# Patient Record
Sex: FEMALE | Race: WHITE | Employment: UNEMPLOYED | URBAN - METROPOLITAN AREA
[De-identification: names, ages, dates, MRNs, and addresses within clinical notes are randomized per-mention and may not be internally consistent; named-entity substitution may affect disease eponyms.]

---

## 2017-11-08 PROBLEM — F51.04 PSYCHOPHYSIOLOGICAL INSOMNIA: Status: ACTIVE | Noted: 2017-11-08

## 2017-11-08 PROBLEM — F41.1 GAD (GENERALIZED ANXIETY DISORDER): Status: ACTIVE | Noted: 2017-11-08

## 2017-11-08 PROBLEM — Z91.89 AT RISK FOR POLYPHARMACY: Status: ACTIVE | Noted: 2017-11-08

## 2017-11-08 PROBLEM — F90.2 ATTENTION DEFICIT HYPERACTIVITY DISORDER (ADHD), COMBINED TYPE: Status: ACTIVE | Noted: 2017-11-08

## 2018-01-18 ENCOUNTER — HOSPITAL ENCOUNTER (OUTPATIENT)
Dept: CT IMAGING | Age: 43
Discharge: HOME | End: 2018-01-18
Attending: FAMILY MEDICINE

## 2018-01-18 DIAGNOSIS — Z13.9 ENCOUNTER FOR SCREENING: ICD-10-CM

## 2018-05-03 PROCEDURE — 87086 URINE CULTURE/COLONY COUNT: CPT | Performed by: PHYSICIAN ASSISTANT

## 2018-05-03 PROCEDURE — 87798 DETECT AGENT NOS DNA AMP: CPT | Performed by: PHYSICIAN ASSISTANT

## 2018-05-03 PROCEDURE — 36415 COLL VENOUS BLD VENIPUNCTURE: CPT | Performed by: PHYSICIAN ASSISTANT

## 2018-05-03 PROCEDURE — 81015 MICROSCOPIC EXAM OF URINE: CPT | Performed by: PHYSICIAN ASSISTANT

## 2018-05-30 PROCEDURE — 87660 TRICHOMONAS VAGIN DIR PROBE: CPT | Performed by: PHYSICIAN ASSISTANT

## 2018-05-30 PROCEDURE — 87480 CANDIDA DNA DIR PROBE: CPT | Performed by: PHYSICIAN ASSISTANT

## 2018-05-30 PROCEDURE — 81015 MICROSCOPIC EXAM OF URINE: CPT | Performed by: PHYSICIAN ASSISTANT

## 2018-05-30 PROCEDURE — 87798 DETECT AGENT NOS DNA AMP: CPT | Performed by: PHYSICIAN ASSISTANT

## 2018-05-30 PROCEDURE — 87510 GARDNER VAG DNA DIR PROBE: CPT | Performed by: PHYSICIAN ASSISTANT

## 2018-11-14 ENCOUNTER — OFFICE VISIT (OUTPATIENT)
Dept: FAMILY MEDICINE CLINIC | Facility: CLINIC | Age: 43
End: 2018-11-14
Payer: COMMERCIAL

## 2018-11-14 VITALS
WEIGHT: 170 LBS | BODY MASS INDEX: 27.32 KG/M2 | OXYGEN SATURATION: 98 % | DIASTOLIC BLOOD PRESSURE: 70 MMHG | SYSTOLIC BLOOD PRESSURE: 100 MMHG | TEMPERATURE: 99 F | HEIGHT: 66 IN | HEART RATE: 94 BPM | RESPIRATION RATE: 18 BRPM

## 2018-11-14 DIAGNOSIS — H10.9 CONJUNCTIVITIS OF LEFT EYE, UNSPECIFIED CONJUNCTIVITIS TYPE: Primary | ICD-10-CM

## 2018-11-14 PROCEDURE — 99212 OFFICE O/P EST SF 10 MIN: CPT | Performed by: NURSE PRACTITIONER

## 2018-11-14 RX ORDER — POLYMYXIN B SULFATE AND TRIMETHOPRIM 1; 10000 MG/ML; [USP'U]/ML
1 SOLUTION OPHTHALMIC EVERY 4 HOURS
Qty: 1 BOTTLE | Refills: 0 | Status: SHIPPED | OUTPATIENT
Start: 2018-11-14 | End: 2018-11-21

## 2018-11-15 NOTE — PROGRESS NOTES
CHIEF COMPLAINT:   Patient presents with:  Irritation: Left eye since today, No OTC drops used. Itchiness  Tearing      HPI:   Tim Salvador is a 39year old female who presents with chief complaint of \"pink eye\". Symptoms began  1  days ago.  Symptoms • Lipids Neg    • Obesity Neg    • Psychiatric Neg       Social History    Tobacco Use      Smoking status: Never Smoker      Smokeless tobacco: Never Used    Alcohol use: No    Drug use: No        REVIEW OF SYSTEMS:   GENERAL: feels well otherwise, no fev • Polymyxin B-Trimethoprim 57490-7.1 UNIT/ML-% Ophthalmic Solution 1 Bottle 0     Sig: Place 1 drop into the left eye every 4 (four) hours for 7 days. Risks, benefits, complications and side effects of meds discussed.         Patient Instructions © 6211-0925 [employer] [address]. [address] [facility] [place], [address] [place]. All rights reserved. This information is not intended as a substitute for professional medical care. Always follow your healthcare professional's instructions.               C

## 2019-02-13 ENCOUNTER — OFFICE VISIT (OUTPATIENT)
Dept: FAMILY MEDICINE CLINIC | Facility: CLINIC | Age: 44
End: 2019-02-13
Payer: COMMERCIAL

## 2019-02-13 VITALS
HEART RATE: 76 BPM | HEIGHT: 65.5 IN | DIASTOLIC BLOOD PRESSURE: 70 MMHG | SYSTOLIC BLOOD PRESSURE: 110 MMHG | WEIGHT: 164 LBS | BODY MASS INDEX: 27 KG/M2 | TEMPERATURE: 99 F | RESPIRATION RATE: 12 BRPM

## 2019-02-13 DIAGNOSIS — E78.6 LOW HDL (UNDER 40): ICD-10-CM

## 2019-02-13 DIAGNOSIS — R42 DIZZINESS: Primary | ICD-10-CM

## 2019-02-13 PROCEDURE — 99203 OFFICE O/P NEW LOW 30 MIN: CPT | Performed by: FAMILY MEDICINE

## 2019-02-13 NOTE — PROGRESS NOTES
Elva Clark is a 39year old female who presents for a new pt assessment   HPI:   Patient presents with:  Establish Care  Dizziness   I reviewed her's Past Medical History, Past Surgical History, Family History and Social History and updated her electro facility-administered medications on file prior to visit. Past History:   She has LGSIL (low grade squamous intraepithelial lesion) on Pap smear; Raynaud phenomenon; Chronic sore throat; LPRD (laryngopharyngeal reflux disease); Stomach ulcer;  At risk index is 26.88 kg/m² as calculated from the following:    Height as of this encounter: 65.5\". Weight as of this encounter: 164 lb.    Physical Exam     ASSESSMENT AND PLAN:   Aida Sweet is a 39year old female who presents for a complete physical ex

## 2019-09-10 ENCOUNTER — OFFICE VISIT (OUTPATIENT)
Dept: SURGERY | Facility: CLINIC | Age: 44
End: 2019-09-10

## 2019-09-10 VITALS — WEIGHT: 159 LBS | BODY MASS INDEX: 26.17 KG/M2 | HEIGHT: 65.35 IN

## 2019-09-10 DIAGNOSIS — M62.08 RECTUS DIASTASIS: Primary | ICD-10-CM

## 2019-09-10 RX ORDER — MULTIVITAMIN WITH IRON
1 TABLET ORAL DAILY
COMMUNITY

## 2019-09-10 NOTE — PROGRESS NOTES
New Patient Consultation    This is the first visit for this 39year old female interested in abdominoplasty. History of Present Illness: The patient is a 39year old female referred by Dr. Romulo Ortega for evaluation for abdominal contouring.   The patient re Neg    • Obesity Neg    • Psychiatric Neg          Social History:    Alcohol use No         Smoking status: Never Smoker   Smokeless tobacco: Never Used         Drug use: No           Review of Systems:    General:   The patient denies, fever, chills, nig pulmonary embolism.      Gynecologic:  The patient denies irregular menses, pelvic pain, pain with intercourse, painful menses, or pregnancy    Musculoskeletal:  The patient denies muscle aches/pain, joint pain, stiff joints, neck pain, back pain or bone pa umbilical necrosis, nerve injury and subsequent numbness, hypertrophic scarring or keloid, swelling, scar visibility, as well as medical risks including DVT and PE.   We reviewed the expected postoperative course including need for drains, activity limitati

## 2019-09-11 ENCOUNTER — TELEPHONE (OUTPATIENT)
Dept: SURGERY | Facility: CLINIC | Age: 44
End: 2019-09-11

## 2019-09-11 NOTE — TELEPHONE ENCOUNTER
Pt calling regarding pending quote for abdominoplasty. Message routed to  pool to call patient back at [phone].

## 2019-09-13 ENCOUNTER — TELEPHONE (OUTPATIENT)
Dept: SURGERY | Facility: CLINIC | Age: 44
End: 2019-09-13

## 2019-09-13 NOTE — TELEPHONE ENCOUNTER
PT calling regarding obtaining surgical quote for abdominoplasty. Pt was seen by Dr. Carrol Bamberger on 9/10. Call routed to  to call patient back at [phone].

## 2019-09-18 ENCOUNTER — TELEPHONE (OUTPATIENT)
Dept: SURGERY | Facility: CLINIC | Age: 44
End: 2019-09-18

## 2019-09-18 NOTE — TELEPHONE ENCOUNTER
Reviewed cosmetic quote with patient and emailed her a copy to [email or web address]. I let the patient know what her next steps would be should she choose to accept the price quote.

## 2019-09-26 ENCOUNTER — TELEPHONE (OUTPATIENT)
Dept: SURGERY | Facility: CLINIC | Age: 44
End: 2019-09-26

## 2019-09-26 NOTE — TELEPHONE ENCOUNTER
Patient calling to hold a date for a tummy tuck with Dr Yadira Ortiz. Per patient, our office told her that surgery will take place within 2-3 weeks of the pre-op appointment. Informed patient that Dr Yadira Ortiz is booked until next year. Patient was surprised.  She

## 2019-09-30 ENCOUNTER — TELEPHONE (OUTPATIENT)
Dept: SURGERY | Facility: CLINIC | Age: 44
End: 2019-09-30

## 2019-09-30 NOTE — TELEPHONE ENCOUNTER
Patient calling to follow up on date for surgery. Informed her that I talked to Dr Laura Hilton and the soonest availability isn't until January. Adding patient to a wait list in case of cancellations. Patient would like surgery at [facility].  Holding 1/15 for surge

## 2019-10-07 ENCOUNTER — TELEPHONE (OUTPATIENT)
Dept: FAMILY MEDICINE CLINIC | Facility: CLINIC | Age: 44
End: 2019-10-07

## 2019-10-07 ENCOUNTER — OFFICE VISIT (OUTPATIENT)
Dept: SURGERY | Facility: CLINIC | Age: 44
End: 2019-10-07
Payer: COMMERCIAL

## 2019-10-07 DIAGNOSIS — L98.7 EXCESS SKIN OF ABDOMINAL WALL: Primary | ICD-10-CM

## 2019-10-07 PROCEDURE — 99212 OFFICE O/P EST SF 10 MIN: CPT | Performed by: PHYSICIAN ASSISTANT

## 2019-10-07 NOTE — PROGRESS NOTES
This is a 48-year-old female that presents today for preoperative discussion regarding her upcoming abdominoplasty procedure. She underwent a 60 pound weight loss with diet and lifestyle modification and is bothered by the excess skin of the abdomen.   She obtained today. She understands she needs to see her primary care physician for surgical clearance and H&P within 30 days of surgery. CBC and CMP will be required.   She has never been a smoker and does not have hypertension therefore at this time no EKG

## 2019-10-07 NOTE — TELEPHONE ENCOUNTER
Per Dr Romulo Ortega, Patient will need pre-op physical however no surgical date has been specified yet. OV note from surgeon, Dr Tomy Kruger 10/07/2019    needs to see her primary care physician for surgical clearance and H&P within 30 days of surgery.   CBC and CMP w

## 2019-10-07 NOTE — PATIENT INSTRUCTIONS
Surgeon:         Dr. Loida Cortez                                        Tel:        [phone]                                  Fax:        [phone]    Surgery/Procedure:                  Abdominoplasty   General anesthesia,  4 hours      Hospital:

## 2019-12-03 ENCOUNTER — OFFICE VISIT (OUTPATIENT)
Dept: FAMILY MEDICINE CLINIC | Facility: CLINIC | Age: 44
End: 2019-12-03
Payer: COMMERCIAL

## 2019-12-03 VITALS
SYSTOLIC BLOOD PRESSURE: 112 MMHG | DIASTOLIC BLOOD PRESSURE: 74 MMHG | HEART RATE: 62 BPM | WEIGHT: 160 LBS | TEMPERATURE: 99 F | RESPIRATION RATE: 17 BRPM | BODY MASS INDEX: 26.66 KG/M2 | HEIGHT: 65 IN

## 2019-12-03 DIAGNOSIS — F41.1 GAD (GENERALIZED ANXIETY DISORDER): ICD-10-CM

## 2019-12-03 DIAGNOSIS — Z00.00 LABORATORY EXAMINATION ORDERED AS PART OF A COMPLETE PHYSICAL EXAMINATION: ICD-10-CM

## 2019-12-03 DIAGNOSIS — J38.1 VOCAL CORD POLYP: ICD-10-CM

## 2019-12-03 DIAGNOSIS — Z00.00 WELL ADULT EXAM: Primary | ICD-10-CM

## 2019-12-03 DIAGNOSIS — B00.1 HERPES LABIALIS: ICD-10-CM

## 2019-12-03 DIAGNOSIS — F90.2 ATTENTION DEFICIT HYPERACTIVITY DISORDER (ADHD), COMBINED TYPE: ICD-10-CM

## 2019-12-03 PROCEDURE — 99396 PREV VISIT EST AGE 40-64: CPT | Performed by: PHYSICIAN ASSISTANT

## 2019-12-03 RX ORDER — DULOXETIN HYDROCHLORIDE 60 MG/1
1 CAPSULE, DELAYED RELEASE ORAL DAILY
COMMUNITY
Start: 2019-12-02 | End: 2021-04-21

## 2019-12-03 RX ORDER — PHENTERMINE HYDROCHLORIDE 37.5 MG/1
1 TABLET ORAL DAILY
Refills: 1 | COMMUNITY
Start: 2019-10-31 | End: 2020-01-09

## 2019-12-03 RX ORDER — BUPROPION HYDROCHLORIDE 150 MG/1
1 TABLET ORAL DAILY
COMMUNITY
Start: 2019-12-02

## 2019-12-03 RX ORDER — ALPRAZOLAM 2 MG/1
1 TABLET, EXTENDED RELEASE ORAL DAILY
Refills: 1 | COMMUNITY
Start: 2019-10-05

## 2019-12-03 NOTE — PROGRESS NOTES
DATE OF EXAM  12/3/2019    CHIEF COMPLAINT/REASON FOR VISIT  Annual exam.    HISTORY OF PRESENT ILLNESS  Rita Gauthier presents today for routine annual physical examination. Needs physical for work.  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 PHYSICAL EXAMINATION  Blood pressure 112/74, pulse 62, temperature 98.5 °F (36.9 °C), temperature source Oral, resp.  rate 17, height 65\", weight 160 lb (72.6 kg), last menstrual period 12/01/2019, not currently breastfee Future  - CBC WITH DIFFERENTIAL WITH PLATELET; Future  - TSH W REFLEX TO FREE T4; Future  - LIPID PANEL; Future    3. Vocal cord polyp  Chronic. Monitored by ENT. Noted to be benign and not currently requiring additional follow up.     4. Herpes labialis  W

## 2019-12-18 ENCOUNTER — LAB ENCOUNTER (OUTPATIENT)
Dept: LAB | Age: 44
End: 2019-12-18
Attending: PHYSICIAN ASSISTANT
Payer: COMMERCIAL

## 2019-12-18 DIAGNOSIS — Z00.00 LABORATORY EXAMINATION ORDERED AS PART OF A COMPLETE PHYSICAL EXAMINATION: ICD-10-CM

## 2019-12-18 PROCEDURE — 80050 GENERAL HEALTH PANEL: CPT | Performed by: PHYSICIAN ASSISTANT

## 2019-12-18 PROCEDURE — 36415 COLL VENOUS BLD VENIPUNCTURE: CPT | Performed by: PHYSICIAN ASSISTANT

## 2019-12-18 PROCEDURE — 80061 LIPID PANEL: CPT | Performed by: PHYSICIAN ASSISTANT

## 2019-12-19 ENCOUNTER — OFFICE VISIT (OUTPATIENT)
Dept: FAMILY MEDICINE CLINIC | Facility: CLINIC | Age: 44
End: 2019-12-19
Payer: COMMERCIAL

## 2019-12-19 VITALS
HEART RATE: 72 BPM | DIASTOLIC BLOOD PRESSURE: 70 MMHG | SYSTOLIC BLOOD PRESSURE: 90 MMHG | WEIGHT: 160 LBS | HEIGHT: 65.5 IN | TEMPERATURE: 98 F | BODY MASS INDEX: 26.34 KG/M2 | RESPIRATION RATE: 12 BRPM

## 2019-12-19 DIAGNOSIS — Z01.818 PREOPERATIVE CLEARANCE: Primary | ICD-10-CM

## 2019-12-19 DIAGNOSIS — E78.6 LOW HDL (UNDER 40): ICD-10-CM

## 2019-12-19 PROCEDURE — 99242 OFF/OP CONSLTJ NEW/EST SF 20: CPT | Performed by: FAMILY MEDICINE

## 2019-12-20 NOTE — H&P (VIEW-ONLY)
Cuate Lutz is a 38year old female who presents for a pre-operative physical exam at the request of Dr. Brianna Baltazar for evaluation of preoperative risk. Patient is to have abdominoplasty after weight loss, to be done by Dr. Brianna Balatzar  at [facility] on 1/15/2020. palpitations. Gastrointestinal: Negative. Negative for abdominal pain. Endocrine: Negative. Genitourinary: Negative. Negative for dysuria and difficulty urinating. Musculoskeletal: Negative for joint swelling. Skin: Negative.   Negative for dylan She has no axillary adenopathy. Neurological: She is alert and oriented to person, place, and time. She has normal strength and normal reflexes. No cranial nerve deficit or sensory deficit. Skin: Skin is warm, dry and intact. No rash noted.  She is not 11.9 11.0 - 15.0 %    RDW-SD 41.1 35.1 - 46.3 fL    Neutrophil Absolute Prelim 4.66 1.50 - 7.70 x10 (3) uL    Neutrophil Absolute 4.66 1.50 - 7.70 x10(3) uL    Lymphocyte Absolute 2.85 1.00 - 4.00 x10(3) uL    Monocyte Absolute 0.50 0.10 - 1.00 x10(3) uL

## 2019-12-20 NOTE — H&P
Cuate Lutz is a 38year old female who presents for a pre-operative physical exam at the request of Dr. Brianna Baltazar for evaluation of preoperative risk. Patient is to have abdominoplasty after weight loss, to be done by Dr. Brianna Baltazar  at [facility] on 1/15/2020. palpitations. Gastrointestinal: Negative. Negative for abdominal pain. Endocrine: Negative. Genitourinary: Negative. Negative for dysuria and difficulty urinating. Musculoskeletal: Negative for joint swelling. Skin: Negative.   Negative for dylan She has no axillary adenopathy. Neurological: She is alert and oriented to person, place, and time. She has normal strength and normal reflexes. No cranial nerve deficit or sensory deficit. Skin: Skin is warm, dry and intact. No rash noted.  She is not 11.9 11.0 - 15.0 %    RDW-SD 41.1 35.1 - 46.3 fL    Neutrophil Absolute Prelim 4.66 1.50 - 7.70 x10 (3) uL    Neutrophil Absolute 4.66 1.50 - 7.70 x10(3) uL    Lymphocyte Absolute 2.85 1.00 - 4.00 x10(3) uL    Monocyte Absolute 0.50 0.10 - 1.00 x10(3) uL

## 2020-01-08 ENCOUNTER — TELEPHONE (OUTPATIENT)
Dept: SURGERY | Facility: CLINIC | Age: 45
End: 2020-01-08

## 2020-01-08 DIAGNOSIS — M62.08 RECTUS DIASTASIS: Primary | ICD-10-CM

## 2020-01-08 NOTE — TELEPHONE ENCOUNTER
The patient called regarding multiple questions prior to planned surgery-  All questions answered to her satisfaction and she was appreciative of the advice.

## 2020-01-10 ENCOUNTER — TELEPHONE (OUTPATIENT)
Dept: SURGERY | Facility: CLINIC | Age: 45
End: 2020-01-10

## 2020-01-10 NOTE — TELEPHONE ENCOUNTER
Spoke to pt regarding her payment for her surgery next week, pt stated she didn't have her credit card with her, and that her  had it. Pt asked that I call back on Monday when she has the card.  I told pt that would be fine and I will call her on Mon

## 2020-01-13 ENCOUNTER — TELEPHONE (OUTPATIENT)
Dept: SURGERY | Facility: CLINIC | Age: 45
End: 2020-01-13

## 2020-01-13 NOTE — TELEPHONE ENCOUNTER
I spoke with the patient who called with pre-op questions-   We discussed surgical wash and what to expect on the day of surgery. All questions answered to the patient's satisfaction and she verbalized understanding.

## 2020-01-13 NOTE — TELEPHONE ENCOUNTER
LVM for pt to call me back, left my direct phone number. Need to collect payment for her procedure with Dr. Destiny Law on Wednesday.

## 2020-01-15 ENCOUNTER — ANESTHESIA EVENT (OUTPATIENT)
Dept: SURGERY | Facility: HOSPITAL | Age: 45
End: 2020-01-15

## 2020-01-15 ENCOUNTER — HOSPITAL ENCOUNTER (OUTPATIENT)
Facility: HOSPITAL | Age: 45
Setting detail: HOSPITAL OUTPATIENT SURGERY
Discharge: HOME | End: 2020-01-15
Attending: SURGERY | Admitting: SURGERY

## 2020-01-15 ENCOUNTER — ANESTHESIA (OUTPATIENT)
Dept: SURGERY | Facility: HOSPITAL | Age: 45
End: 2020-01-15

## 2020-01-15 VITALS
OXYGEN SATURATION: 96 % | HEIGHT: 66 IN | WEIGHT: 162.06 LBS | BODY MASS INDEX: 26.05 KG/M2 | HEART RATE: 92 BPM | TEMPERATURE: 99 F | RESPIRATION RATE: 18 BRPM | DIASTOLIC BLOOD PRESSURE: 76 MMHG | SYSTOLIC BLOOD PRESSURE: 112 MMHG

## 2020-01-15 DIAGNOSIS — M62.08 RECTUS DIASTASIS: ICD-10-CM

## 2020-01-15 LAB
POCT LOT NUMBER: NORMAL
POCT URINE PREGNANCY: NEGATIVE

## 2020-01-15 PROCEDURE — 0J083ZZ ALTERATION OF ABDOMEN SUBCUTANEOUS TISSUE AND FASCIA, PERCUTANEOUS APPROACH: ICD-10-PCS | Performed by: SURGERY

## 2020-01-15 PROCEDURE — 0J080ZZ ALTERATION OF ABDOMEN SUBCUTANEOUS TISSUE AND FASCIA, OPEN APPROACH: ICD-10-PCS | Performed by: SURGERY

## 2020-01-15 PROCEDURE — 81025 URINE PREGNANCY TEST: CPT | Performed by: SURGERY

## 2020-01-15 RX ORDER — NALOXONE HYDROCHLORIDE 0.4 MG/ML
80 INJECTION, SOLUTION INTRAMUSCULAR; INTRAVENOUS; SUBCUTANEOUS AS NEEDED
Status: DISCONTINUED | OUTPATIENT
Start: 2020-01-15 | End: 2020-01-15

## 2020-01-15 RX ORDER — GLYCOPYRROLATE 0.2 MG/ML
INJECTION, SOLUTION INTRAMUSCULAR; INTRAVENOUS AS NEEDED
Status: DISCONTINUED | OUTPATIENT
Start: 2020-01-15 | End: 2020-01-15 | Stop reason: SURG

## 2020-01-15 RX ORDER — ACETAMINOPHEN 500 MG
1000 TABLET ORAL EVERY 6 HOURS PRN
COMMUNITY
End: 2020-06-23 | Stop reason: ALTCHOICE

## 2020-01-15 RX ORDER — PHENYLEPHRINE HCL 10 MG/ML
VIAL (ML) INJECTION AS NEEDED
Status: DISCONTINUED | OUTPATIENT
Start: 2020-01-15 | End: 2020-01-15 | Stop reason: SURG

## 2020-01-15 RX ORDER — HYDROMORPHONE HYDROCHLORIDE 1 MG/ML
INJECTION, SOLUTION INTRAMUSCULAR; INTRAVENOUS; SUBCUTANEOUS AS NEEDED
Status: DISCONTINUED | OUTPATIENT
Start: 2020-01-15 | End: 2020-01-15 | Stop reason: SURG

## 2020-01-15 RX ORDER — MIDAZOLAM HYDROCHLORIDE 1 MG/ML
INJECTION INTRAMUSCULAR; INTRAVENOUS AS NEEDED
Status: DISCONTINUED | OUTPATIENT
Start: 2020-01-15 | End: 2020-01-15 | Stop reason: SURG

## 2020-01-15 RX ORDER — LIDOCAINE HYDROCHLORIDE AND EPINEPHRINE 10; 10 MG/ML; UG/ML
INJECTION, SOLUTION INFILTRATION; PERINEURAL AS NEEDED
Status: DISCONTINUED | OUTPATIENT
Start: 2020-01-15 | End: 2020-01-15 | Stop reason: HOSPADM

## 2020-01-15 RX ORDER — MEPERIDINE HYDROCHLORIDE 25 MG/ML
12.5 INJECTION INTRAMUSCULAR; INTRAVENOUS; SUBCUTANEOUS AS NEEDED
Status: DISCONTINUED | OUTPATIENT
Start: 2020-01-15 | End: 2020-01-15

## 2020-01-15 RX ORDER — CEFAZOLIN SODIUM/WATER 2 G/20 ML
SYRINGE (ML) INTRAVENOUS
Status: DISCONTINUED
Start: 2020-01-15 | End: 2020-01-15

## 2020-01-15 RX ORDER — ROCURONIUM BROMIDE 10 MG/ML
INJECTION, SOLUTION INTRAVENOUS AS NEEDED
Status: DISCONTINUED | OUTPATIENT
Start: 2020-01-15 | End: 2020-01-15 | Stop reason: SURG

## 2020-01-15 RX ORDER — LIDOCAINE HYDROCHLORIDE 10 MG/ML
INJECTION, SOLUTION EPIDURAL; INFILTRATION; INTRACAUDAL; PERINEURAL AS NEEDED
Status: DISCONTINUED | OUTPATIENT
Start: 2020-01-15 | End: 2020-01-15 | Stop reason: SURG

## 2020-01-15 RX ORDER — ACETAMINOPHEN 500 MG
1000 TABLET ORAL ONCE
Status: DISCONTINUED | OUTPATIENT
Start: 2020-01-15 | End: 2020-01-15 | Stop reason: HOSPADM

## 2020-01-15 RX ORDER — DOCUSATE SODIUM 100 MG/1
100 CAPSULE, LIQUID FILLED ORAL 2 TIMES DAILY
Qty: 40 CAPSULE | Refills: 0 | Status: SHIPPED | OUTPATIENT
Start: 2020-01-15 | End: 2020-03-10 | Stop reason: ALTCHOICE

## 2020-01-15 RX ORDER — DIPHENHYDRAMINE HYDROCHLORIDE 50 MG/ML
12.5 INJECTION INTRAMUSCULAR; INTRAVENOUS AS NEEDED
Status: DISCONTINUED | OUTPATIENT
Start: 2020-01-15 | End: 2020-01-15

## 2020-01-15 RX ORDER — ACETAMINOPHEN 10 MG/ML
INJECTION, SOLUTION INTRAVENOUS AS NEEDED
Status: DISCONTINUED | OUTPATIENT
Start: 2020-01-15 | End: 2020-01-15 | Stop reason: SURG

## 2020-01-15 RX ORDER — ONDANSETRON 4 MG/1
4 TABLET, FILM COATED ORAL EVERY 8 HOURS PRN
Qty: 20 TABLET | Refills: 0 | Status: SHIPPED | OUTPATIENT
Start: 2020-01-15 | End: 2020-03-10 | Stop reason: ALTCHOICE

## 2020-01-15 RX ORDER — HYDROCODONE BITARTRATE AND ACETAMINOPHEN 5; 325 MG/1; MG/1
1-2 TABLET ORAL EVERY 4 HOURS PRN
Qty: 40 TABLET | Refills: 0 | Status: SHIPPED | OUTPATIENT
Start: 2020-01-15 | End: 2020-03-10 | Stop reason: ALTCHOICE

## 2020-01-15 RX ORDER — BUPIVACAINE HYDROCHLORIDE AND EPINEPHRINE 5; 5 MG/ML; UG/ML
INJECTION, SOLUTION EPIDURAL; INTRACAUDAL; PERINEURAL AS NEEDED
Status: DISCONTINUED | OUTPATIENT
Start: 2020-01-15 | End: 2020-01-15 | Stop reason: HOSPADM

## 2020-01-15 RX ORDER — ONDANSETRON 2 MG/ML
INJECTION INTRAMUSCULAR; INTRAVENOUS AS NEEDED
Status: DISCONTINUED | OUTPATIENT
Start: 2020-01-15 | End: 2020-01-15 | Stop reason: SURG

## 2020-01-15 RX ORDER — CEFAZOLIN SODIUM/WATER 2 G/20 ML
2 SYRINGE (ML) INTRAVENOUS ONCE
Status: COMPLETED | OUTPATIENT
Start: 2020-01-15 | End: 2020-01-15

## 2020-01-15 RX ORDER — SODIUM CHLORIDE, SODIUM LACTATE, POTASSIUM CHLORIDE, CALCIUM CHLORIDE 600; 310; 30; 20 MG/100ML; MG/100ML; MG/100ML; MG/100ML
INJECTION, SOLUTION INTRAVENOUS CONTINUOUS
Status: DISCONTINUED | OUTPATIENT
Start: 2020-01-15 | End: 2020-01-15

## 2020-01-15 RX ORDER — HYDROMORPHONE HYDROCHLORIDE 1 MG/ML
0.4 INJECTION, SOLUTION INTRAMUSCULAR; INTRAVENOUS; SUBCUTANEOUS EVERY 5 MIN PRN
Status: DISCONTINUED | OUTPATIENT
Start: 2020-01-15 | End: 2020-01-15

## 2020-01-15 RX ORDER — NEOSTIGMINE METHYLSULFATE 1 MG/ML
INJECTION INTRAVENOUS AS NEEDED
Status: DISCONTINUED | OUTPATIENT
Start: 2020-01-15 | End: 2020-01-15 | Stop reason: SURG

## 2020-01-15 RX ORDER — DEXAMETHASONE SODIUM PHOSPHATE 4 MG/ML
VIAL (ML) INJECTION AS NEEDED
Status: DISCONTINUED | OUTPATIENT
Start: 2020-01-15 | End: 2020-01-15 | Stop reason: SURG

## 2020-01-15 RX ORDER — ONDANSETRON 2 MG/ML
4 INJECTION INTRAMUSCULAR; INTRAVENOUS AS NEEDED
Status: DISCONTINUED | OUTPATIENT
Start: 2020-01-15 | End: 2020-01-15

## 2020-01-15 RX ADMIN — ONDANSETRON 4 MG: 2 INJECTION INTRAMUSCULAR; INTRAVENOUS at 14:44:00

## 2020-01-15 RX ADMIN — CEFAZOLIN SODIUM/WATER 2 G: 2 G/20 ML SYRINGE (ML) INTRAVENOUS at 11:23:00

## 2020-01-15 RX ADMIN — PHENYLEPHRINE HCL 50 MCG: 10 MG/ML VIAL (ML) INJECTION at 14:15:00

## 2020-01-15 RX ADMIN — ROCURONIUM BROMIDE 20 MG: 10 INJECTION, SOLUTION INTRAVENOUS at 12:11:00

## 2020-01-15 RX ADMIN — HYDROMORPHONE HYDROCHLORIDE 0.2 MG: 1 INJECTION, SOLUTION INTRAMUSCULAR; INTRAVENOUS; SUBCUTANEOUS at 14:57:00

## 2020-01-15 RX ADMIN — PHENYLEPHRINE HCL 100 MCG: 10 MG/ML VIAL (ML) INJECTION at 11:53:00

## 2020-01-15 RX ADMIN — MIDAZOLAM HYDROCHLORIDE 2 MG: 1 INJECTION INTRAMUSCULAR; INTRAVENOUS at 11:03:00

## 2020-01-15 RX ADMIN — ROCURONIUM BROMIDE 20 MG: 10 INJECTION, SOLUTION INTRAVENOUS at 11:41:00

## 2020-01-15 RX ADMIN — PHENYLEPHRINE HCL 50 MCG: 10 MG/ML VIAL (ML) INJECTION at 13:07:00

## 2020-01-15 RX ADMIN — SODIUM CHLORIDE, SODIUM LACTATE, POTASSIUM CHLORIDE, CALCIUM CHLORIDE: 600; 310; 30; 20 INJECTION, SOLUTION INTRAVENOUS at 12:07:00

## 2020-01-15 RX ADMIN — HYDROMORPHONE HYDROCHLORIDE 0.2 MG: 1 INJECTION, SOLUTION INTRAMUSCULAR; INTRAVENOUS; SUBCUTANEOUS at 14:37:00

## 2020-01-15 RX ADMIN — LIDOCAINE HYDROCHLORIDE 50 MG: 10 INJECTION, SOLUTION EPIDURAL; INFILTRATION; INTRACAUDAL; PERINEURAL at 11:06:00

## 2020-01-15 RX ADMIN — ACETAMINOPHEN 1000 MG: 10 INJECTION, SOLUTION INTRAVENOUS at 12:24:00

## 2020-01-15 RX ADMIN — SODIUM CHLORIDE, SODIUM LACTATE, POTASSIUM CHLORIDE, CALCIUM CHLORIDE: 600; 310; 30; 20 INJECTION, SOLUTION INTRAVENOUS at 15:00:00

## 2020-01-15 RX ADMIN — GLYCOPYRROLATE 0.6 MG: 0.2 INJECTION, SOLUTION INTRAMUSCULAR; INTRAVENOUS at 14:44:00

## 2020-01-15 RX ADMIN — PHENYLEPHRINE HCL 50 MCG: 10 MG/ML VIAL (ML) INJECTION at 13:20:00

## 2020-01-15 RX ADMIN — HYDROMORPHONE HYDROCHLORIDE 0.2 MG: 1 INJECTION, SOLUTION INTRAMUSCULAR; INTRAVENOUS; SUBCUTANEOUS at 14:50:00

## 2020-01-15 RX ADMIN — ROCURONIUM BROMIDE 50 MG: 10 INJECTION, SOLUTION INTRAVENOUS at 11:06:00

## 2020-01-15 RX ADMIN — DEXAMETHASONE SODIUM PHOSPHATE 8 MG: 4 MG/ML VIAL (ML) INJECTION at 11:17:00

## 2020-01-15 RX ADMIN — NEOSTIGMINE METHYLSULFATE 4 MG: 1 INJECTION INTRAVENOUS at 14:44:00

## 2020-01-15 NOTE — BRIEF OP NOTE
Pre-Operative Diagnosis: Rectus diastasis [M62.08]     Post-Operative Diagnosis: Rectus diastasis [M62.08]      Procedure Performed:   Procedure(s):  Abdominoplasty and flank/abdominal liposuction    Surgeon(s) and Role:     Hao Benton MD - Primary

## 2020-01-15 NOTE — ANESTHESIA PROCEDURE NOTES
Airway  Urgency: elective      General Information and Staff    Patient location during procedure: OR  Anesthesiologist: Len Mak MD  Resident/CRNA: Candy Wasserman CRNA  Performed: CRNA     Indications and Patient Condition  Indications for airway

## 2020-01-15 NOTE — ANESTHESIA PREPROCEDURE EVALUATION
PRE-OP EVALUATION    Patient Name: Katia Espinal    Pre-op Diagnosis: Rectus diastasis [M62.08]    Procedure(s):  Abdominoplasty    Surgeon(s) and Role:     Dagoberto Boles MD - Primary    Pre-op vitals reviewed.   Temp: 98.3 °F (36.8 °C)  Pulse: 94  Resp disorder)     Attention deficit hyperactivity disorder (ADHD), combined type     Psychophysiological insomnia     Low HDL (under 40)          Past Surgical History:   Procedure Laterality Date   •      • UPPER GI ENDOSCOPY - REFERRAL

## 2020-01-15 NOTE — ANESTHESIA POSTPROCEDURE EVALUATION
[facility]    Delicia Reina Patient Status:  Hospital Outpatient Surgery   Age/Gender 38year old female MRN [id number]   Location [address] [facility] Attending Shauna Luciano MD   Hosp Day # 0 PCP MD Yennifer Germain

## 2020-01-15 NOTE — PROGRESS NOTES
We reviewed the plan for abdominoplasty with flank liposuction.  The reviewed the risks of surgery including but not limited to bleeding, infection, seroma, dehiscence, skin necrosis, injury to intra-abdominal viscera, contour abnormality, umbilical necrosi

## 2020-01-16 NOTE — OPERATIVE REPORT
[facility]    PATIENT'S NAME: Marion Gale BHAVNA C   ATTENDING PHYSICIAN: Kishore Suárez M.D. OPERATING PHYSICIAN: Kishore Suárez M.D.    PATIENT ACCOUNT#:   [de-identified]    LOCATION:  PACU [facility] PACU 5 EDWP 10  MEDICAL RECORD #:   [id number]       DATE OF B loosely secured with Kerlix. The abdomen was then prepped and draped sterilely. The lateral aspects of the abdominal scar were then infiltrated with 1% lidocaine with epinephrine.   Stab incisions were then created, and approximately 800 mL of tumescent s noted, and resection of sub-Cecy's   fascia was done directly with electrocautery. The wounds were then irrigated with saline irrigation and hemostasis was secured with electrocautery.   Two 15-St Helen[place] Ajith drains were exited through the lateral aspects

## 2020-01-21 ENCOUNTER — OFFICE VISIT (OUTPATIENT)
Dept: SURGERY | Facility: CLINIC | Age: 45
End: 2020-01-21
Payer: COMMERCIAL

## 2020-01-21 DIAGNOSIS — Z98.890 S/P ABDOMINOPLASTY: Primary | ICD-10-CM

## 2020-01-21 PROCEDURE — 99024 POSTOP FOLLOW-UP VISIT: CPT | Performed by: SURGERY

## 2020-01-21 NOTE — PROGRESS NOTES
Hannah Caruso is a 38year old female who presents today for a follow-up. She reports minimal discomfort. Physical Examination:  Incisions are with intact Steri-Strips. Umbilicus is viable. Drain effluent is serosanguineous.     Assessment and Plan

## 2020-01-24 ENCOUNTER — OFFICE VISIT (OUTPATIENT)
Dept: SURGERY | Facility: CLINIC | Age: 45
End: 2020-01-24
Payer: COMMERCIAL

## 2020-01-24 ENCOUNTER — TELEPHONE (OUTPATIENT)
Dept: SURGERY | Facility: CLINIC | Age: 45
End: 2020-01-24

## 2020-01-24 DIAGNOSIS — L98.7 EXCESS SKIN OF ABDOMINAL WALL: Primary | ICD-10-CM

## 2020-01-24 PROCEDURE — 99024 POSTOP FOLLOW-UP VISIT: CPT | Performed by: PHYSICIAN ASSISTANT

## 2020-01-24 NOTE — TELEPHONE ENCOUNTER
Patient called stating her drain out put was 1.22.20 at 25cc and 1.23.20 was 15cc and nothing this morning. Patient was instructed to come in to see racquel to have he drain removed.  Patient has verbalized understanding and will be in today

## 2020-01-24 NOTE — PROGRESS NOTES
This is a 58-year-old female that is 9 days status post her abdominoplasty with flank liposuction.   She is here today for additional drain evaluation as after her initial drain was removed earlier this week her second drain is draining less than 20 cc for

## 2020-02-11 ENCOUNTER — OFFICE VISIT (OUTPATIENT)
Dept: SURGERY | Facility: CLINIC | Age: 45
End: 2020-02-11
Payer: COMMERCIAL

## 2020-02-11 DIAGNOSIS — Z98.890 H/O ABDOMINOPLASTY: Primary | ICD-10-CM

## 2020-02-11 PROCEDURE — 99024 POSTOP FOLLOW-UP VISIT: CPT | Performed by: SURGERY

## 2020-02-11 NOTE — PROGRESS NOTES
Aida Sweet is a 38year old female who presents today for a follow-up. Her pain is well controlled. She is without new complaints. Physical Examination:  Abdomen: Flat. Umbilicus is viable.   There is a punctate area of separation noted at the s

## 2020-03-10 ENCOUNTER — OFFICE VISIT (OUTPATIENT)
Dept: SURGERY | Facility: CLINIC | Age: 45
End: 2020-03-10
Payer: COMMERCIAL

## 2020-03-10 DIAGNOSIS — L98.7 EXCESS SKIN OF ABDOMINAL WALL: Primary | ICD-10-CM

## 2020-03-10 PROCEDURE — 99024 POSTOP FOLLOW-UP VISIT: CPT | Performed by: SURGERY

## 2020-03-10 NOTE — PROGRESS NOTES
Jomar Garner is a 38year old female who presents today for a follow-up. She denies fever and chills. She denies nausea, vomiting, diarrhea or constipation. Physical Examination:  Wound: Incisions are well-healed. Umbilicus is well-healed.   Ther

## 2020-05-05 ENCOUNTER — TELEPHONE (OUTPATIENT)
Dept: FAMILY MEDICINE CLINIC | Facility: CLINIC | Age: 45
End: 2020-05-05

## 2020-05-05 NOTE — TELEPHONE ENCOUNTER
Virtual/Telephone Check-In    Aida Micki verbally declines a Virtual/Telephone Check-In service on 5/5/2020. Patient understands and accepts financial responsibility for any deductible, co-insurance and/or co-pays associated with this service.     Jessica

## 2020-05-05 NOTE — TELEPHONE ENCOUNTER
Here for physical 12/3/2019. Noticed 3-4 days ago an elevated, hard,painful lump extending from the perineum to the inner L thigh. Painful with walking,sitting and movement in general, feeling a pressure. About 1 1/2 inches in size.   Feels it has gotten

## 2020-05-06 ENCOUNTER — OFFICE VISIT (OUTPATIENT)
Dept: FAMILY MEDICINE CLINIC | Facility: CLINIC | Age: 45
End: 2020-05-06
Payer: COMMERCIAL

## 2020-05-06 VITALS
RESPIRATION RATE: 16 BRPM | SYSTOLIC BLOOD PRESSURE: 90 MMHG | TEMPERATURE: 98 F | BODY MASS INDEX: 27.64 KG/M2 | WEIGHT: 172 LBS | HEART RATE: 84 BPM | DIASTOLIC BLOOD PRESSURE: 60 MMHG | HEIGHT: 66 IN

## 2020-05-06 DIAGNOSIS — L72.9 INFECTED CYST OF SKIN: Primary | ICD-10-CM

## 2020-05-06 DIAGNOSIS — L08.9 INFECTED CYST OF SKIN: Primary | ICD-10-CM

## 2020-05-06 PROCEDURE — 99213 OFFICE O/P EST LOW 20 MIN: CPT | Performed by: PHYSICIAN ASSISTANT

## 2020-05-06 RX ORDER — SULFAMETHOXAZOLE AND TRIMETHOPRIM 800; 160 MG/1; MG/1
1 TABLET ORAL 2 TIMES DAILY
Qty: 14 TABLET | Refills: 0 | Status: SHIPPED | OUTPATIENT
Start: 2020-05-06 | End: 2020-05-12

## 2020-05-06 NOTE — PROGRESS NOTES
Patient presents with:  Derm Problem: x 5 days firm mass formed on the inside of her left thigh, sensitive to the touch       HISTORY OF Noemy Tyson is a 38year old female who presents for evaluation of vulvar lump.  Initially noticed 5 appropriately dressed. : 1.5 x 1 cm firm mass left inferior vulva. Mild warmth and tenderness to palpation. No drainage.       ASSESSMENT/ PLAN  (L72.9,  L08.9) Infected cyst of skin  (primary encounter diagnosis)  Plan: Suspect infected cyst vs small ab

## 2020-05-28 ENCOUNTER — TELEPHONE (OUTPATIENT)
Dept: OBGYN CLINIC | Facility: CLINIC | Age: 45
End: 2020-05-28

## 2020-05-28 NOTE — TELEPHONE ENCOUNTER
Per pt she says that her pain has been getting worse since about 2 days ago, with a discharge clear and with odor, a lots of pain specially when sitting. Please advise and call pt.  She has an appt on 06- and does not know if she should or could wait

## 2020-05-28 NOTE — TELEPHONE ENCOUNTER
Patient states she was seen by her PCP for bartholin cyst and was put on antibiotics for 2 weeks. It is still there and her doctor recommended she see Dr Baljeet Garcia . She has appointment for 6/8/20 but needs to come in sooner due to pain.  Patient will call ellie

## 2020-06-01 ENCOUNTER — OFFICE VISIT (OUTPATIENT)
Dept: OBGYN CLINIC | Facility: CLINIC | Age: 45
End: 2020-06-01
Payer: COMMERCIAL

## 2020-06-01 VITALS
HEART RATE: 114 BPM | WEIGHT: 177 LBS | SYSTOLIC BLOOD PRESSURE: 104 MMHG | BODY MASS INDEX: 28.45 KG/M2 | HEIGHT: 66 IN | DIASTOLIC BLOOD PRESSURE: 62 MMHG

## 2020-06-01 DIAGNOSIS — Z12.31 ENCOUNTER FOR SCREENING MAMMOGRAM FOR MALIGNANT NEOPLASM OF BREAST: ICD-10-CM

## 2020-06-01 DIAGNOSIS — Z01.419 WELL WOMAN EXAM WITH ROUTINE GYNECOLOGICAL EXAM: Primary | ICD-10-CM

## 2020-06-01 DIAGNOSIS — Z12.4 PAPANICOLAOU SMEAR FOR CERVICAL CANCER SCREENING: ICD-10-CM

## 2020-06-01 DIAGNOSIS — N76.0 VAGINITIS AND VULVOVAGINITIS: ICD-10-CM

## 2020-06-01 PROCEDURE — 87660 TRICHOMONAS VAGIN DIR PROBE: CPT | Performed by: OBSTETRICS & GYNECOLOGY

## 2020-06-01 PROCEDURE — 56420 I&D BARTHOLINS GLAND ABSCESS: CPT | Performed by: OBSTETRICS & GYNECOLOGY

## 2020-06-01 PROCEDURE — 88175 CYTOPATH C/V AUTO FLUID REDO: CPT | Performed by: OBSTETRICS & GYNECOLOGY

## 2020-06-01 PROCEDURE — 99386 PREV VISIT NEW AGE 40-64: CPT | Performed by: OBSTETRICS & GYNECOLOGY

## 2020-06-01 PROCEDURE — 87480 CANDIDA DNA DIR PROBE: CPT | Performed by: OBSTETRICS & GYNECOLOGY

## 2020-06-01 PROCEDURE — 87510 GARDNER VAG DNA DIR PROBE: CPT | Performed by: OBSTETRICS & GYNECOLOGY

## 2020-06-01 NOTE — PROGRESS NOTES
Fatuma Lakhani is a 38year old female [address] Patient's last menstrual period was 05/18/2020 (exact date). Patient presents with:  Gyn Problem: pt has had bartholin cyst for 3 weeks now. Was on antibiotic from [place]. Causing pain and discomfort.  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  Urethral Meatus:  normal in size, location, without lesions and prolapse  Bladder:  No fullness, masses or tenderness  Vagina:  Normal appearance without lesions, no abnormal discharge affirm.   Retaine

## 2020-06-01 NOTE — PROGRESS NOTES
Left 3 to 4 cm Bartholin's cyst.  Local was placed. Betadine was placed. The Bartholin's cyst was drained. Boo catheter could not be placed.

## 2020-06-02 RX ORDER — METRONIDAZOLE 500 MG/1
500 TABLET ORAL 2 TIMES DAILY
Qty: 14 TABLET | Refills: 0 | Status: SHIPPED | OUTPATIENT
Start: 2020-06-02 | End: 2020-06-09

## 2020-06-02 NOTE — PROGRESS NOTES
Patient aware of results and recommendations for Flagyl 500 mg BID X 7 days and scheduled for recheck in 1 month. Appointment made for 6/23/20. Patient verbalizes understanding.

## 2020-06-23 ENCOUNTER — OFFICE VISIT (OUTPATIENT)
Dept: OBGYN CLINIC | Facility: CLINIC | Age: 45
End: 2020-06-23
Payer: COMMERCIAL

## 2020-06-23 VITALS
SYSTOLIC BLOOD PRESSURE: 112 MMHG | HEIGHT: 66 IN | DIASTOLIC BLOOD PRESSURE: 62 MMHG | WEIGHT: 171 LBS | BODY MASS INDEX: 27.48 KG/M2

## 2020-06-23 DIAGNOSIS — N76.0 VAGINITIS AND VULVOVAGINITIS: Primary | ICD-10-CM

## 2020-06-23 DIAGNOSIS — Z12.4 PAPANICOLAOU SMEAR FOR CERVICAL CANCER SCREENING: ICD-10-CM

## 2020-06-23 PROCEDURE — 87480 CANDIDA DNA DIR PROBE: CPT | Performed by: OBSTETRICS & GYNECOLOGY

## 2020-06-23 PROCEDURE — 99213 OFFICE O/P EST LOW 20 MIN: CPT | Performed by: OBSTETRICS & GYNECOLOGY

## 2020-06-23 PROCEDURE — 87510 GARDNER VAG DNA DIR PROBE: CPT | Performed by: OBSTETRICS & GYNECOLOGY

## 2020-06-23 PROCEDURE — 87660 TRICHOMONAS VAGIN DIR PROBE: CPT | Performed by: OBSTETRICS & GYNECOLOGY

## 2020-06-23 NOTE — PROGRESS NOTES
Lissa Merino is a 38year old female. HPI:   Patient presents with:  Gyn Problem: re check BV infection? Thinks that she has resolved the trichomoniasis and bacterial vaginosis. She finished the medicine.   After finishing the medicine she had li

## 2020-06-24 ENCOUNTER — TELEPHONE (OUTPATIENT)
Dept: OBGYN CLINIC | Facility: CLINIC | Age: 45
End: 2020-06-24

## 2021-06-15 ENCOUNTER — OFFICE VISIT (OUTPATIENT)
Dept: OBGYN CLINIC | Facility: CLINIC | Age: 46
End: 2021-06-15
Payer: COMMERCIAL

## 2021-06-15 VITALS
HEIGHT: 66 IN | DIASTOLIC BLOOD PRESSURE: 66 MMHG | WEIGHT: 190 LBS | HEART RATE: 106 BPM | SYSTOLIC BLOOD PRESSURE: 110 MMHG | BODY MASS INDEX: 30.53 KG/M2

## 2021-06-15 DIAGNOSIS — N89.8 VAGINAL ODOR: ICD-10-CM

## 2021-06-15 DIAGNOSIS — Z12.4 PAPANICOLAOU SMEAR FOR CERVICAL CANCER SCREENING: ICD-10-CM

## 2021-06-15 DIAGNOSIS — N30.00 ACUTE CYSTITIS WITHOUT HEMATURIA: ICD-10-CM

## 2021-06-15 DIAGNOSIS — Z12.31 ENCOUNTER FOR SCREENING MAMMOGRAM FOR BREAST CANCER: Primary | ICD-10-CM

## 2021-06-15 DIAGNOSIS — Z01.419 WELL WOMAN EXAM WITH ROUTINE GYNECOLOGICAL EXAM: ICD-10-CM

## 2021-06-15 PROCEDURE — 3074F SYST BP LT 130 MM HG: CPT | Performed by: OBSTETRICS & GYNECOLOGY

## 2021-06-15 PROCEDURE — 87480 CANDIDA DNA DIR PROBE: CPT | Performed by: OBSTETRICS & GYNECOLOGY

## 2021-06-15 PROCEDURE — 87660 TRICHOMONAS VAGIN DIR PROBE: CPT | Performed by: OBSTETRICS & GYNECOLOGY

## 2021-06-15 PROCEDURE — 99396 PREV VISIT EST AGE 40-64: CPT | Performed by: OBSTETRICS & GYNECOLOGY

## 2021-06-15 PROCEDURE — 88175 CYTOPATH C/V AUTO FLUID REDO: CPT | Performed by: OBSTETRICS & GYNECOLOGY

## 2021-06-15 PROCEDURE — 87088 URINE BACTERIA CULTURE: CPT | Performed by: OBSTETRICS & GYNECOLOGY

## 2021-06-15 PROCEDURE — 87186 SC STD MICRODIL/AGAR DIL: CPT | Performed by: OBSTETRICS & GYNECOLOGY

## 2021-06-15 PROCEDURE — 87086 URINE CULTURE/COLONY COUNT: CPT | Performed by: OBSTETRICS & GYNECOLOGY

## 2021-06-15 PROCEDURE — 3078F DIAST BP <80 MM HG: CPT | Performed by: OBSTETRICS & GYNECOLOGY

## 2021-06-15 PROCEDURE — 87510 GARDNER VAG DNA DIR PROBE: CPT | Performed by: OBSTETRICS & GYNECOLOGY

## 2021-06-15 PROCEDURE — 3008F BODY MASS INDEX DOCD: CPT | Performed by: OBSTETRICS & GYNECOLOGY

## 2021-06-15 NOTE — PROGRESS NOTES
Ariel Donovan is a 54year old female  Patient's last menstrual period was 2021 (exact date). Patient presents with:  Wellness Visit: minor burning when urinating  . She has a history of infertility.   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   Uterus: normal in size, contour, position, mobility, without tenderness  Adnexa: normal without masses or tenderness  Perineum: normal  Anus: no hemorroids     Assessment & Plan:  Rita was seen today for wellness v

## 2021-06-16 ENCOUNTER — TELEPHONE (OUTPATIENT)
Dept: OBGYN CLINIC | Facility: CLINIC | Age: 46
End: 2021-06-16

## 2021-06-16 NOTE — TELEPHONE ENCOUNTER
Pt advised positive for BV and UTI, pt advised of usual recommendations for BV. Would like to go with what ever treatments are recommended by KD.

## 2021-06-17 RX ORDER — NITROFURANTOIN 25; 75 MG/1; MG/1
100 CAPSULE ORAL 2 TIMES DAILY
Qty: 14 CAPSULE | Refills: 0 | Status: SHIPPED | OUTPATIENT
Start: 2021-06-17 | End: 2021-06-24

## 2021-06-17 RX ORDER — METRONIDAZOLE 250 MG/1
250 TABLET ORAL 3 TIMES DAILY
Qty: 30 TABLET | Refills: 0 | Status: SHIPPED | OUTPATIENT
Start: 2021-06-17 | End: 2021-06-24

## 2021-06-17 NOTE — PROGRESS NOTES
Patient aware of lab results and recommendation to treat UTI with Macrobid and given options to treat BV. Patient would like to do Flagyl. Medication orders sent to Dr. Nando Parks. Advised patient to follow up with any new or worsening symptoms.  Patient verbal

## 2021-06-18 NOTE — TELEPHONE ENCOUNTER
Pt called to speak to a nurse about test results. Pt states she can see on MyChart there were no abnormalities but Pt states she was told to take the antibiotic. Please advise why.

## 2021-06-18 NOTE — TELEPHONE ENCOUNTER
Pt advised only pap was normal, normal comments were linked to other results as well. Understanding verbalized.

## 2021-06-29 ENCOUNTER — OFFICE VISIT (OUTPATIENT)
Dept: SURGERY | Facility: CLINIC | Age: 46
End: 2021-06-29
Payer: COMMERCIAL

## 2021-06-29 DIAGNOSIS — L91.0 HYPERTROPHIC SCAR: ICD-10-CM

## 2021-06-29 DIAGNOSIS — L91.0 KELOID SCAR: Primary | ICD-10-CM

## 2021-06-29 PROCEDURE — 96372 THER/PROPH/DIAG INJ SC/IM: CPT | Performed by: SURGERY

## 2021-06-29 NOTE — PROGRESS NOTES
Robbi Fish is a 54year old female who presents today for a follow-up. The patient relates that she noticed an opening of her abdominal incision several days ago. She denies fevers or chills.   She relates that she is gained approximate 30 pounds sin

## 2021-06-29 NOTE — PROCEDURES
The umbilicus was cleansed with alcohol. The areas of scar hypertrophy were injected with 0.2 mL of Kenalog 10. Band-Aid was applied. The patient tolerated the injections well. There are no complications.

## 2021-08-03 ENCOUNTER — OFFICE VISIT (OUTPATIENT)
Dept: SURGERY | Facility: CLINIC | Age: 46
End: 2021-08-03
Payer: COMMERCIAL

## 2021-08-03 DIAGNOSIS — L91.0 HYPERTROPHIC SCAR: ICD-10-CM

## 2021-08-03 DIAGNOSIS — L91.0 KELOID SCAR: Primary | ICD-10-CM

## 2021-08-03 PROCEDURE — 99212 OFFICE O/P EST SF 10 MIN: CPT | Performed by: SURGERY

## 2021-08-03 PROCEDURE — 11900 INJECT SKIN LESIONS </W 7: CPT | Performed by: SURGERY

## 2021-08-03 NOTE — PROCEDURES
The umbilicus was cleansed with alcohol. The areas of scar hypertrophy were injected with 0.3 mL of Kenalog 10. Band-Aid was applied. The patient tolerated the injections well. There are no complications.

## 2021-08-03 NOTE — PROGRESS NOTES
Marcela Zhou is a 54year old female who presents today for a follow-up. She denies fever and chills. Physical Examination:  Abdomen: There have breakdown is completely healed. The periumbilical scar hypertrophy is slightly improved.     Assessme

## 2021-08-26 ENCOUNTER — TELEPHONE (OUTPATIENT)
Dept: FAMILY MEDICINE CLINIC | Facility: CLINIC | Age: 46
End: 2021-08-26

## 2021-08-30 ENCOUNTER — TELEPHONE (OUTPATIENT)
Dept: FAMILY MEDICINE CLINIC | Facility: CLINIC | Age: 46
End: 2021-08-30

## 2021-08-30 DIAGNOSIS — D50.9 IRON DEFICIENCY ANEMIA, UNSPECIFIED IRON DEFICIENCY ANEMIA TYPE: Primary | ICD-10-CM

## 2021-08-30 DIAGNOSIS — Z00.00 LABORATORY EXAMINATION ORDERED AS PART OF A ROUTINE GENERAL MEDICAL EXAMINATION: ICD-10-CM

## 2021-08-30 DIAGNOSIS — Z13.0 SCREENING FOR IRON DEFICIENCY ANEMIA: ICD-10-CM

## 2021-08-30 DIAGNOSIS — Z12.31 VISIT FOR SCREENING MAMMOGRAM: ICD-10-CM

## 2021-08-30 DIAGNOSIS — R73.9 HYPERGLYCEMIA: ICD-10-CM

## 2021-08-30 NOTE — TELEPHONE ENCOUNTER
See TC from 8/26/21. Patient left a message on the referral line. She states she had MRI done at [facility] they are requesting a biopsy.    Please call patient back at [phone]

## 2021-08-30 NOTE — TELEPHONE ENCOUNTER
Please enter lab orders for the patient's upcoming physical appointment. Physical scheduled:    Your appointments     Date & Time Appointment Department [facility])    Sep 30, 2021  9:30 AM CDT Physical - Established with Yan Bernardo  [facility]

## 2021-08-30 NOTE — TELEPHONE ENCOUNTER
1. Iron deficiency anemia, unspecified iron deficiency anemia type (Primary)  -     CBC, Platelet; No Differential; Future; Expected date: 08/30/2021  -     Ferritin;  Future; Expected date: 08/30/2021  -     Iron And Tibc; Future; Expected date: 08/30/2021

## 2021-08-30 NOTE — TELEPHONE ENCOUNTER
Received fax from Kumar Wilson with patient's Right Breast Ultrasound results, placed in Dr. Erin Camejo' in box

## 2021-08-30 NOTE — TELEPHONE ENCOUNTER
Pt states she had breast MRI which recommends bx. She is unsure who ordered tests originally, but she requested results be sent to Dr Royce Ellis. Routed to Dr. Royce Ellis - have you received this report?     Future Appointments   Date Time Provider Department Cent

## 2021-08-31 NOTE — TELEPHONE ENCOUNTER
Impression    Incomplete, MRI directed ultrasound is recommended to assess the focal enhancement in the right breast at 5:00, in the absence of ultrasound correlation, MRI guided biopsy is recommended. Edenilson Karimi      RECOMMENDATION:     RIGHT BREAST:  A breast ultra

## 2021-09-22 ENCOUNTER — LAB ENCOUNTER (OUTPATIENT)
Dept: LAB | Age: 46
End: 2021-09-22
Attending: FAMILY MEDICINE
Payer: COMMERCIAL

## 2021-09-22 DIAGNOSIS — Z13.0 SCREENING FOR IRON DEFICIENCY ANEMIA: ICD-10-CM

## 2021-09-22 DIAGNOSIS — R73.9 HYPERGLYCEMIA: ICD-10-CM

## 2021-09-22 DIAGNOSIS — Z00.00 LABORATORY EXAMINATION ORDERED AS PART OF A ROUTINE GENERAL MEDICAL EXAMINATION: ICD-10-CM

## 2021-09-22 DIAGNOSIS — D50.9 IRON DEFICIENCY ANEMIA, UNSPECIFIED IRON DEFICIENCY ANEMIA TYPE: ICD-10-CM

## 2021-09-22 LAB
ALBUMIN SERPL-MCNC: 3.6 G/DL (ref 3.4–5)
ALBUMIN/GLOB SERPL: 0.9 {RATIO} (ref 1–2)
ALP LIVER SERPL-CCNC: 91 U/L
ALT SERPL-CCNC: 84 U/L
ANION GAP SERPL CALC-SCNC: 11 MMOL/L (ref 0–18)
AST SERPL-CCNC: 25 U/L (ref 15–37)
BILIRUB SERPL-MCNC: 0.8 MG/DL (ref 0.1–2)
BUN BLD-MCNC: 12 MG/DL (ref 7–18)
CALCIUM BLD-MCNC: 9.5 MG/DL (ref 8.5–10.1)
CHLORIDE SERPL-SCNC: 107 MMOL/L (ref 98–112)
CHOLEST SERPL-MCNC: 185 MG/DL (ref ?–200)
CO2 SERPL-SCNC: 22 MMOL/L (ref 21–32)
CREAT BLD-MCNC: 1.01 MG/DL
DEPRECATED HBV CORE AB SER IA-ACNC: 47.1 NG/ML
ERYTHROCYTE [DISTWIDTH] IN BLOOD BY AUTOMATED COUNT: 12.5 %
EST. AVERAGE GLUCOSE BLD GHB EST-MCNC: 117 MG/DL (ref 68–126)
GLOBULIN PLAS-MCNC: 4.2 G/DL (ref 2.8–4.4)
GLUCOSE BLD-MCNC: 98 MG/DL (ref 70–99)
HBA1C MFR BLD HPLC: 5.7 % (ref ?–5.7)
HCT VFR BLD AUTO: 41.9 %
HDLC SERPL-MCNC: 38 MG/DL (ref 40–59)
HGB BLD-MCNC: 13.5 G/DL
IRON SATURATION: 38 %
IRON SERPL-MCNC: 173 UG/DL
LDLC SERPL CALC-MCNC: 119 MG/DL (ref ?–100)
MCH RBC QN AUTO: 30.8 PG (ref 26–34)
MCHC RBC AUTO-ENTMCNC: 32.2 G/DL (ref 31–37)
MCV RBC AUTO: 95.4 FL
NONHDLC SERPL-MCNC: 147 MG/DL (ref ?–130)
OSMOLALITY SERPL CALC.SUM OF ELEC: 290 MOSM/KG (ref 275–295)
PATIENT FASTING Y/N/NP: YES
PATIENT FASTING Y/N/NP: YES
PLATELET # BLD AUTO: 403 10(3)UL (ref 150–450)
POTASSIUM SERPL-SCNC: 4 MMOL/L (ref 3.5–5.1)
PROT SERPL-MCNC: 7.8 G/DL (ref 6.4–8.2)
RBC # BLD AUTO: 4.39 X10(6)UL
SODIUM SERPL-SCNC: 140 MMOL/L (ref 136–145)
TOTAL IRON BINDING CAPACITY: 450 UG/DL (ref 240–450)
TRANSFERRIN SERPL-MCNC: 302 MG/DL (ref 200–360)
TRIGL SERPL-MCNC: 158 MG/DL (ref 30–149)
TSI SER-ACNC: 1.99 MIU/ML (ref 0.36–3.74)
VLDLC SERPL CALC-MCNC: 28 MG/DL (ref 0–30)
WBC # BLD AUTO: 8.7 X10(3) UL (ref 4–11)

## 2021-09-22 PROCEDURE — 82728 ASSAY OF FERRITIN: CPT | Performed by: FAMILY MEDICINE

## 2021-09-22 PROCEDURE — 80061 LIPID PANEL: CPT | Performed by: FAMILY MEDICINE

## 2021-09-22 PROCEDURE — 83036 HEMOGLOBIN GLYCOSYLATED A1C: CPT | Performed by: FAMILY MEDICINE

## 2021-09-22 PROCEDURE — 83540 ASSAY OF IRON: CPT | Performed by: FAMILY MEDICINE

## 2021-09-22 PROCEDURE — 83550 IRON BINDING TEST: CPT | Performed by: FAMILY MEDICINE

## 2021-09-22 PROCEDURE — 80050 GENERAL HEALTH PANEL: CPT | Performed by: FAMILY MEDICINE

## 2021-09-29 RX ORDER — ZOLPIDEM TARTRATE 10 MG/1
TABLET ORAL
COMMUNITY
Start: 2021-04-23 | End: 2021-09-30

## 2021-09-30 ENCOUNTER — OFFICE VISIT (OUTPATIENT)
Dept: FAMILY MEDICINE CLINIC | Facility: CLINIC | Age: 46
End: 2021-09-30
Payer: COMMERCIAL

## 2021-09-30 VITALS
RESPIRATION RATE: 14 BRPM | SYSTOLIC BLOOD PRESSURE: 122 MMHG | HEART RATE: 68 BPM | HEIGHT: 66 IN | BODY MASS INDEX: 30.76 KG/M2 | DIASTOLIC BLOOD PRESSURE: 60 MMHG | WEIGHT: 191.38 LBS

## 2021-09-30 DIAGNOSIS — Z00.00 ANNUAL PHYSICAL EXAM: Primary | ICD-10-CM

## 2021-09-30 DIAGNOSIS — R79.89 ELEVATED LFTS: ICD-10-CM

## 2021-09-30 DIAGNOSIS — E78.2 MIXED DYSLIPIDEMIA: ICD-10-CM

## 2021-09-30 DIAGNOSIS — R73.03 PREDIABETES: ICD-10-CM

## 2021-09-30 PROCEDURE — 99396 PREV VISIT EST AGE 40-64: CPT | Performed by: FAMILY MEDICINE

## 2021-09-30 PROCEDURE — 3078F DIAST BP <80 MM HG: CPT | Performed by: FAMILY MEDICINE

## 2021-09-30 PROCEDURE — 3008F BODY MASS INDEX DOCD: CPT | Performed by: FAMILY MEDICINE

## 2021-09-30 PROCEDURE — 3074F SYST BP LT 130 MM HG: CPT | Performed by: FAMILY MEDICINE

## 2021-09-30 NOTE — PROGRESS NOTES
Chanda Ortiz is a 54year old female who presents for a complete physical exam.     had concerns including Physical ([facility], see's gyn ). Annual Physical due on 06/15/2022      Subjective:     Symptoms: periods are regular.  Patient complains of recent Santiago Colonoscopy Never done      Review of Systems   Constitutional: Negative. Negative for activity change, appetite change, chills and fever. HENT: Negative. Eyes: Negative. Respiratory: Negative. Negative for shortness of breath.     Cardiovascu not in acute distress. Appearance: Normal appearance. HENT:      Head: Normocephalic and atraumatic.       Right Ear: Tympanic membrane and external ear normal.      Left Ear: Tympanic membrane and external ear normal.      Nose: Nose normal.      [employer] maintenance, Up to date    Immunizations: Up to date   Immunization History   Administered Date(s) Administered   • Covid-19 Vaccine Pfizer 30 mcg/0.3 ml 04/09/2021, 05/04/2021   • Fluvirin, 3 Years & >, Im 10/30/2007   • Influenza 01/23/2015   •

## 2021-10-20 ENCOUNTER — TELEPHONE (OUTPATIENT)
Dept: GENETICS | Facility: HOSPITAL | Age: 46
End: 2021-10-20

## 2021-11-09 ENCOUNTER — TELEPHONE (OUTPATIENT)
Dept: SURGERY | Facility: CLINIC | Age: 46
End: 2021-11-09

## 2021-11-09 ENCOUNTER — OFFICE VISIT (OUTPATIENT)
Dept: SURGERY | Facility: CLINIC | Age: 46
End: 2021-11-09
Payer: COMMERCIAL

## 2021-11-09 DIAGNOSIS — Z98.890 S/P ABDOMINOPLASTY: Primary | ICD-10-CM

## 2021-11-09 PROCEDURE — 99212 OFFICE O/P EST SF 10 MIN: CPT | Performed by: SURGERY

## 2021-11-09 NOTE — PROGRESS NOTES
Jomar Garner is a 54year old female who presents today for a follow-up. She complains of a painful abdominal scar. She reports itching and occasional breakdown of the lower abdominal scar. She has been unsuccessful at weight loss.   She is interested

## 2021-11-09 NOTE — TELEPHONE ENCOUNTER
Informing Caryn Jay, Cosmetic Coordinator, to submit for insurance approval for abdominal scar revision, 2 hours.

## 2021-11-11 ENCOUNTER — TELEPHONE (OUTPATIENT)
Dept: SURGERY | Facility: CLINIC | Age: 46
End: 2021-11-11

## 2021-11-11 NOTE — TELEPHONE ENCOUNTER
Patient called and would like to receive a quote while she wait to see if insurance will cover. Patient would like to get on the surgery schedule ASAP. Patient state that the scar cause her lots of pain and discomfort.  Patient was told I will talk to Dr. Tavo Rider

## 2021-11-24 ENCOUNTER — GENETICS ENCOUNTER (OUTPATIENT)
Dept: GENETICS | Age: 46
End: 2021-11-24
Attending: GENETIC COUNSELOR, MS
Payer: COMMERCIAL

## 2021-11-24 ENCOUNTER — NURSE ONLY (OUTPATIENT)
Dept: HEMATOLOGY/ONCOLOGY | Age: 46
End: 2021-11-24
Attending: GENETIC COUNSELOR, MS
Payer: COMMERCIAL

## 2021-11-24 DIAGNOSIS — Z80.3 FAMILY HISTORY OF BREAST CANCER: Primary | ICD-10-CM

## 2021-11-24 PROCEDURE — 36415 COLL VENOUS BLD VENIPUNCTURE: CPT

## 2021-11-24 PROCEDURE — 96040 HC GENETIC COUNSELING EA 30 MIN: CPT | Performed by: GENETIC COUNSELOR, MS

## 2021-11-24 NOTE — PROGRESS NOTES
Referring Provider:  Jennifer Lemon MD    Additional Provider(s):  Wilfred Guillen MD    Reason for Referral:  Eh Sawyer was referred for genetic counseling because of a family history of breast cancer.   Ms. John Garner is a 51year-old woman of [facility] and [place] PMS2, POLD1 (select regions), POLE (select regions), PTEN, RAD51C, RAD51D, RNF43, RPS20, SMAD4, STK11, and TP53 . Ms. Webb’s paternal grandmother  at age 37 from breast cancer. She did not have genetic testing.   Ms. Ubaldo Gonzalez paternal grandfather limitations of genetic testing were discussed including the potential implications of test results on clinical management. If a pathogenic variant is not identified (negative result), it is still possible that Ms. Vincenzo Gomez has a pathogenic variant in on test results with her biological family members so that they may have their risk assessed. Cancer Screening and Prevention Options for BRCA1/2 mutation carriers:   The lifetime risk for breast cancer in a BRCA1/2 mutation carrier is up to 60-80%, with u results. Results will also be communicated to Dr. Lexy Albarran and Dr. Swati Peterson. Approximately 40 minutes was spent in consultation with Ms. Gatito Jamil.

## 2021-12-07 ENCOUNTER — GENETICS ENCOUNTER (OUTPATIENT)
Dept: HEMATOLOGY/ONCOLOGY | Facility: HOSPITAL | Age: 46
End: 2021-12-07

## 2021-12-07 NOTE — PROGRESS NOTES
Referring Provider:                    Ramona Garner MD     Additional Provider(s):              Joo Frye MD     Reason for Referral:  Sirena Stiles had genetic testing performed on 11/24/21 because of a family history of breast cancer.      Genetic Test recommended to consider increased breast cancer surveillance including annual screening breast MRI. Ms. Bethel Mark is already following an increased breast cancer surveillance program with her medical providers.        Cc:  Jefferson Ordonez

## 2022-01-11 ENCOUNTER — OFFICE VISIT (OUTPATIENT)
Dept: SURGERY | Facility: CLINIC | Age: 47
End: 2022-01-11

## 2022-01-11 DIAGNOSIS — Z98.890 S/P ABDOMINOPLASTY: ICD-10-CM

## 2022-01-11 DIAGNOSIS — B36.9 FUNGAL RASH OF TORSO: Primary | ICD-10-CM

## 2022-01-11 RX ORDER — CLOTRIMAZOLE 1 %
1-2 CREAM (GRAM) TOPICAL 2 TIMES DAILY
Qty: 85 G | Refills: 0 | Status: SHIPPED | OUTPATIENT
Start: 2022-01-11

## 2022-01-11 NOTE — PATIENT INSTRUCTIONS
Surgeon:         Dr. Brianna Philip                                        Tel:        [phone]                                  Fax:        [phone]    Surgery/Procedure:     Revision of abdominal scar with abdominal/flank liposuction    General ane consent done 1/11/22  Photos done at initial consult  Quote accepted and signed today, 1/11/22

## 2022-01-11 NOTE — PROGRESS NOTES
Neli Olguin is a 51year old female who presents today for a follow-up. She continues to complain of intermittent breakdown of her lower abdominal wound. She denies fevers or chills. Physical Examination:  Abdomen:  Moderate hypertrophy of the malou

## 2022-01-27 ENCOUNTER — TELEPHONE (OUTPATIENT)
Dept: SURGERY | Facility: CLINIC | Age: 47
End: 2022-01-27

## 2022-01-27 DIAGNOSIS — L98.7 EXCESS SKIN OF ABDOMINAL WALL: Primary | ICD-10-CM

## 2022-01-27 PROBLEM — E78.2 MIXED DYSLIPIDEMIA: Status: RESOLVED | Noted: 2021-09-30 | Resolved: 2022-01-27

## 2022-01-27 RX ORDER — DEXTROAMPHETAMINE SACCHARATE, AMPHETAMINE ASPARTATE, DEXTROAMPHETAMINE SULFATE AND AMPHETAMINE SULFATE 7.5; 7.5; 7.5; 7.5 MG/1; MG/1; MG/1; MG/1
1 TABLET ORAL 2 TIMES DAILY
COMMUNITY
Start: 2022-01-12 | End: 2022-01-28

## 2022-01-27 RX ORDER — DEXTROAMPHETAMINE SACCHARATE, AMPHETAMINE ASPARTATE MONOHYDRATE, DEXTROAMPHETAMINE SULFATE AND AMPHETAMINE SULFATE 7.5; 7.5; 7.5; 7.5 MG/1; MG/1; MG/1; MG/1
30 CAPSULE, EXTENDED RELEASE ORAL EVERY MORNING
COMMUNITY
Start: 2022-01-12 | End: 2022-01-28

## 2022-01-27 RX ORDER — DESVENLAFAXINE 100 MG/1
100 TABLET, EXTENDED RELEASE ORAL DAILY
COMMUNITY
Start: 2022-01-22 | End: 2022-01-28 | Stop reason: ALTCHOICE

## 2022-01-27 NOTE — TELEPHONE ENCOUNTER
Calling pt in regards to scheduling surgery. Informed pt that I have 02/02/2022 available at [facility] with Dr. Rodriguez English. Pt verbalized understanding and in agreement with date and location. All questions answered.    Encouraged pt to call or MyChart

## 2022-01-28 ENCOUNTER — TELEPHONE (OUTPATIENT)
Dept: FAMILY MEDICINE CLINIC | Facility: CLINIC | Age: 47
End: 2022-01-28

## 2022-01-28 ENCOUNTER — OFFICE VISIT (OUTPATIENT)
Dept: FAMILY MEDICINE CLINIC | Facility: CLINIC | Age: 47
End: 2022-01-28
Payer: COMMERCIAL

## 2022-01-28 ENCOUNTER — LABORATORY ENCOUNTER (OUTPATIENT)
Dept: LAB | Age: 47
End: 2022-01-28
Attending: FAMILY MEDICINE
Payer: COMMERCIAL

## 2022-01-28 VITALS
DIASTOLIC BLOOD PRESSURE: 64 MMHG | BODY MASS INDEX: 32.16 KG/M2 | RESPIRATION RATE: 18 BRPM | SYSTOLIC BLOOD PRESSURE: 132 MMHG | WEIGHT: 195.38 LBS | HEIGHT: 65.5 IN | HEART RATE: 74 BPM

## 2022-01-28 DIAGNOSIS — Z01.818 PREOPERATIVE CLEARANCE: ICD-10-CM

## 2022-01-28 DIAGNOSIS — L90.5 SCAR OF ABDOMINAL WALL: Primary | ICD-10-CM

## 2022-01-28 DIAGNOSIS — Z01.818 PRE-OP TESTING: Primary | ICD-10-CM

## 2022-01-28 DIAGNOSIS — L98.7 EXCESS SKIN OF ABDOMINAL WALL: ICD-10-CM

## 2022-01-28 DIAGNOSIS — Z01.818 PRE-OP TESTING: ICD-10-CM

## 2022-01-28 DIAGNOSIS — R73.03 PREDIABETES: ICD-10-CM

## 2022-01-28 DIAGNOSIS — E78.6 LOW HDL (UNDER 40): ICD-10-CM

## 2022-01-28 DIAGNOSIS — E78.2 MIXED DYSLIPIDEMIA: ICD-10-CM

## 2022-01-28 LAB
ALBUMIN SERPL-MCNC: 3.8 G/DL (ref 3.4–5)
ALBUMIN/GLOB SERPL: 1.1 {RATIO} (ref 1–2)
ALP LIVER SERPL-CCNC: 81 U/L
ALT SERPL-CCNC: 41 U/L
ANION GAP SERPL CALC-SCNC: 7 MMOL/L (ref 0–18)
AST SERPL-CCNC: 34 U/L (ref 15–37)
BASOPHILS # BLD AUTO: 0.04 X10(3) UL (ref 0–0.2)
BASOPHILS NFR BLD AUTO: 0.5 %
BILIRUB SERPL-MCNC: 0.4 MG/DL (ref 0.1–2)
BUN BLD-MCNC: 10 MG/DL (ref 7–18)
CALCIUM BLD-MCNC: 9.5 MG/DL (ref 8.5–10.1)
CHLORIDE SERPL-SCNC: 107 MMOL/L (ref 98–112)
CO2 SERPL-SCNC: 26 MMOL/L (ref 21–32)
CREAT BLD-MCNC: 0.81 MG/DL
EOSINOPHIL # BLD AUTO: 0.05 X10(3) UL (ref 0–0.7)
EOSINOPHIL NFR BLD AUTO: 0.6 %
ERYTHROCYTE [DISTWIDTH] IN BLOOD BY AUTOMATED COUNT: 11.9 %
FASTING PATIENT LIPID ANSWER: YES
FASTING STATUS PATIENT QL REPORTED: YES
GLOBULIN PLAS-MCNC: 3.6 G/DL (ref 2.8–4.4)
GLUCOSE BLD-MCNC: 87 MG/DL (ref 70–99)
HCT VFR BLD AUTO: 40.2 %
HDLC SERPL-MCNC: 33 MG/DL (ref 40–59)
IMM GRANULOCYTES # BLD AUTO: 0.02 X10(3) UL (ref 0–1)
IMM GRANULOCYTES NFR BLD: 0.2 %
LDLC SERPL CALC-MCNC: 97 MG/DL (ref ?–100)
LYMPHOCYTES # BLD AUTO: 3.48 X10(3) UL (ref 1–4)
LYMPHOCYTES NFR BLD AUTO: 42 %
MCH RBC QN AUTO: 29.9 PG (ref 26–34)
MCHC RBC AUTO-ENTMCNC: 31.3 G/DL (ref 31–37)
MCV RBC AUTO: 95.3 FL
MONOCYTES # BLD AUTO: 0.59 X10(3) UL (ref 0.1–1)
MONOCYTES NFR BLD AUTO: 7.1 %
NEUTROPHILS # BLD AUTO: 4.1 X10 (3) UL (ref 1.5–7.7)
NEUTROPHILS # BLD AUTO: 4.1 X10(3) UL (ref 1.5–7.7)
NEUTROPHILS NFR BLD AUTO: 49.6 %
NONHDLC SERPL-MCNC: 124 MG/DL (ref ?–130)
OSMOLALITY SERPL CALC.SUM OF ELEC: 288 MOSM/KG (ref 275–295)
PLATELET # BLD AUTO: 432 10(3)UL (ref 150–450)
POTASSIUM SERPL-SCNC: 4.2 MMOL/L (ref 3.5–5.1)
PROT SERPL-MCNC: 7.4 G/DL (ref 6.4–8.2)
RBC # BLD AUTO: 4.22 X10(6)UL
SODIUM SERPL-SCNC: 140 MMOL/L (ref 136–145)
TRIGL SERPL-MCNC: 153 MG/DL (ref 30–149)
VLDLC SERPL CALC-MCNC: 25 MG/DL (ref 0–30)
WBC # BLD AUTO: 8.3 X10(3) UL (ref 4–11)

## 2022-01-28 PROCEDURE — 80061 LIPID PANEL: CPT

## 2022-01-28 PROCEDURE — 3078F DIAST BP <80 MM HG: CPT | Performed by: FAMILY MEDICINE

## 2022-01-28 PROCEDURE — 3008F BODY MASS INDEX DOCD: CPT | Performed by: FAMILY MEDICINE

## 2022-01-28 PROCEDURE — 36415 COLL VENOUS BLD VENIPUNCTURE: CPT

## 2022-01-28 PROCEDURE — 80053 COMPREHEN METABOLIC PANEL: CPT

## 2022-01-28 PROCEDURE — 93000 ELECTROCARDIOGRAM COMPLETE: CPT | Performed by: FAMILY MEDICINE

## 2022-01-28 PROCEDURE — 3075F SYST BP GE 130 - 139MM HG: CPT | Performed by: FAMILY MEDICINE

## 2022-01-28 PROCEDURE — 85025 COMPLETE CBC W/AUTO DIFF WBC: CPT

## 2022-01-28 PROCEDURE — 99243 OFF/OP CNSLTJ NEW/EST LOW 30: CPT | Performed by: FAMILY MEDICINE

## 2022-01-28 NOTE — H&P
Ximena Hendrickson is a 51year old female who presents for a pre-operative physical exam at the request of Dr. Panfilo Velasco for evaluation of preoperative risk. Patient is to have abdominal surgery, to be done by Dr. Panfilo Velasco  at [facility] on 2/2/22.     Pt has had previ for dysuria. Musculoskeletal: Negative for arthralgias. Skin: Negative. Negative for rash. Allergic/Immunologic: Negative. Neurological: Negative.          EXAM:   /64   Pulse 74   Resp 18   Ht 5' 5.5\" (1.664 m)   Wt 195 lb 6.4 oz (88.6 kg) and oriented to person, place, and time. Psychiatric:         Mood and Affect: Mood normal.         Behavior: Behavior normal.         Thought Content:  Thought content normal.          Results for orders placed or performed in visit on 01/28/22   COMP ME %    Lymphocyte % 42.0 %    Monocyte % 7.1 %    Eosinophil % 0.6 %    Basophil % 0.5 %    Immature Granulocyte % 0.2 %     Normal EKG done today in office.  See cardiology section  ASSESSMENT AND PLAN:   Luc Maher is a 51year old female who presents f

## 2022-01-28 NOTE — TELEPHONE ENCOUNTER
Patient having surgery 02/02/22 with Dr. Monique Wu H&P required with CBC and CMP. Patient scheduled with Dr. Sherman Arriola 01/28/22.      Patient is looking to get blood work done today before her appointment with Dr. Sherman Arriola at 12pm. Please place orders    Fax in triage

## 2022-01-29 ENCOUNTER — ANESTHESIA EVENT (OUTPATIENT)
Dept: SURGERY | Facility: HOSPITAL | Age: 47
End: 2022-01-29

## 2022-01-30 ENCOUNTER — LAB ENCOUNTER (OUTPATIENT)
Dept: LAB | Facility: HOSPITAL | Age: 47
End: 2022-01-30
Attending: SURGERY
Payer: COMMERCIAL

## 2022-01-30 DIAGNOSIS — L98.7 EXCESS SKIN OF ABDOMINAL WALL: ICD-10-CM

## 2022-01-31 LAB — SARS-COV-2 RNA RESP QL NAA+PROBE: NOT DETECTED

## 2022-02-02 ENCOUNTER — ANESTHESIA (OUTPATIENT)
Dept: SURGERY | Facility: HOSPITAL | Age: 47
End: 2022-02-02

## 2022-02-02 ENCOUNTER — HOSPITAL ENCOUNTER (OUTPATIENT)
Facility: HOSPITAL | Age: 47
Setting detail: HOSPITAL OUTPATIENT SURGERY
Discharge: HOME | End: 2022-02-02
Attending: SURGERY | Admitting: SURGERY

## 2022-02-02 VITALS
TEMPERATURE: 98 F | WEIGHT: 196.19 LBS | SYSTOLIC BLOOD PRESSURE: 119 MMHG | HEART RATE: 79 BPM | DIASTOLIC BLOOD PRESSURE: 76 MMHG | HEIGHT: 66 IN | OXYGEN SATURATION: 98 % | BODY MASS INDEX: 31.53 KG/M2 | RESPIRATION RATE: 16 BRPM

## 2022-02-02 DIAGNOSIS — L98.7 EXCESS SKIN OF ABDOMINAL WALL: Primary | ICD-10-CM

## 2022-02-02 LAB — B-HCG UR QL: NEGATIVE

## 2022-02-02 PROCEDURE — 0HB7XZZ EXCISION OF ABDOMEN SKIN, EXTERNAL APPROACH: ICD-10-PCS | Performed by: SURGERY

## 2022-02-02 PROCEDURE — 0J083ZZ ALTERATION OF ABDOMEN SUBCUTANEOUS TISSUE AND FASCIA, PERCUTANEOUS APPROACH: ICD-10-PCS | Performed by: SURGERY

## 2022-02-02 PROCEDURE — 81025 URINE PREGNANCY TEST: CPT

## 2022-02-02 RX ORDER — KETAMINE HYDROCHLORIDE 50 MG/ML
INJECTION, SOLUTION, CONCENTRATE INTRAMUSCULAR; INTRAVENOUS AS NEEDED
Status: DISCONTINUED | OUTPATIENT
Start: 2022-02-02 | End: 2022-02-02 | Stop reason: SURG

## 2022-02-02 RX ORDER — HYDROCODONE BITARTRATE AND ACETAMINOPHEN 5; 325 MG/1; MG/1
2 TABLET ORAL AS NEEDED
Status: DISCONTINUED | OUTPATIENT
Start: 2022-02-02 | End: 2022-02-02

## 2022-02-02 RX ORDER — NALOXONE HYDROCHLORIDE 0.4 MG/ML
80 INJECTION, SOLUTION INTRAMUSCULAR; INTRAVENOUS; SUBCUTANEOUS AS NEEDED
Status: DISCONTINUED | OUTPATIENT
Start: 2022-02-02 | End: 2022-02-02

## 2022-02-02 RX ORDER — LIDOCAINE HYDROCHLORIDE 10 MG/ML
INJECTION, SOLUTION EPIDURAL; INFILTRATION; INTRACAUDAL; PERINEURAL AS NEEDED
Status: DISCONTINUED | OUTPATIENT
Start: 2022-02-02 | End: 2022-02-02 | Stop reason: SURG

## 2022-02-02 RX ORDER — DIPHENHYDRAMINE HYDROCHLORIDE 50 MG/ML
12.5 INJECTION INTRAMUSCULAR; INTRAVENOUS AS NEEDED
Status: DISCONTINUED | OUTPATIENT
Start: 2022-02-02 | End: 2022-02-02

## 2022-02-02 RX ORDER — HYDROMORPHONE HYDROCHLORIDE 1 MG/ML
0.4 INJECTION, SOLUTION INTRAMUSCULAR; INTRAVENOUS; SUBCUTANEOUS EVERY 5 MIN PRN
Status: DISCONTINUED | OUTPATIENT
Start: 2022-02-02 | End: 2022-02-02

## 2022-02-02 RX ORDER — ONDANSETRON 4 MG/1
4 TABLET, FILM COATED ORAL EVERY 8 HOURS PRN
Qty: 30 TABLET | Refills: 0 | Status: SHIPPED | OUTPATIENT
Start: 2022-02-02

## 2022-02-02 RX ORDER — METOCLOPRAMIDE HYDROCHLORIDE 5 MG/ML
INJECTION INTRAMUSCULAR; INTRAVENOUS AS NEEDED
Status: DISCONTINUED | OUTPATIENT
Start: 2022-02-02 | End: 2022-02-02 | Stop reason: SURG

## 2022-02-02 RX ORDER — MIDAZOLAM HYDROCHLORIDE 1 MG/ML
1 INJECTION INTRAMUSCULAR; INTRAVENOUS EVERY 5 MIN PRN
Status: DISCONTINUED | OUTPATIENT
Start: 2022-02-02 | End: 2022-02-02

## 2022-02-02 RX ORDER — ACETAMINOPHEN 500 MG
1000 TABLET ORAL ONCE
Status: DISCONTINUED | OUTPATIENT
Start: 2022-02-02 | End: 2022-02-02 | Stop reason: HOSPADM

## 2022-02-02 RX ORDER — CEFAZOLIN SODIUM/WATER 2 G/20 ML
2 SYRINGE (ML) INTRAVENOUS ONCE
Status: COMPLETED | OUTPATIENT
Start: 2022-02-02 | End: 2022-02-02

## 2022-02-02 RX ORDER — MIDAZOLAM HYDROCHLORIDE 1 MG/ML
INJECTION INTRAMUSCULAR; INTRAVENOUS AS NEEDED
Status: DISCONTINUED | OUTPATIENT
Start: 2022-02-02 | End: 2022-02-02 | Stop reason: SURG

## 2022-02-02 RX ORDER — ROCURONIUM BROMIDE 10 MG/ML
INJECTION, SOLUTION INTRAVENOUS AS NEEDED
Status: DISCONTINUED | OUTPATIENT
Start: 2022-02-02 | End: 2022-02-02 | Stop reason: SURG

## 2022-02-02 RX ORDER — ONDANSETRON 2 MG/ML
INJECTION INTRAMUSCULAR; INTRAVENOUS AS NEEDED
Status: DISCONTINUED | OUTPATIENT
Start: 2022-02-02 | End: 2022-02-02 | Stop reason: SURG

## 2022-02-02 RX ORDER — SODIUM CHLORIDE, SODIUM LACTATE, POTASSIUM CHLORIDE, CALCIUM CHLORIDE 600; 310; 30; 20 MG/100ML; MG/100ML; MG/100ML; MG/100ML
INJECTION, SOLUTION INTRAVENOUS CONTINUOUS
Status: DISCONTINUED | OUTPATIENT
Start: 2022-02-02 | End: 2022-02-02

## 2022-02-02 RX ORDER — HYDROCODONE BITARTRATE AND ACETAMINOPHEN 5; 325 MG/1; MG/1
1 TABLET ORAL AS NEEDED
Status: DISCONTINUED | OUTPATIENT
Start: 2022-02-02 | End: 2022-02-02

## 2022-02-02 RX ORDER — MEPERIDINE HYDROCHLORIDE 25 MG/ML
12.5 INJECTION INTRAMUSCULAR; INTRAVENOUS; SUBCUTANEOUS AS NEEDED
Status: DISCONTINUED | OUTPATIENT
Start: 2022-02-02 | End: 2022-02-02

## 2022-02-02 RX ORDER — DOCUSATE SODIUM 100 MG/1
100 CAPSULE, LIQUID FILLED ORAL 2 TIMES DAILY
Qty: 30 CAPSULE | Refills: 0 | Status: SHIPPED | OUTPATIENT
Start: 2022-02-02

## 2022-02-02 RX ORDER — BUPIVACAINE HYDROCHLORIDE AND EPINEPHRINE 5; 5 MG/ML; UG/ML
INJECTION, SOLUTION EPIDURAL; INTRACAUDAL; PERINEURAL AS NEEDED
Status: DISCONTINUED | OUTPATIENT
Start: 2022-02-02 | End: 2022-02-02 | Stop reason: HOSPADM

## 2022-02-02 RX ORDER — ONDANSETRON 2 MG/ML
4 INJECTION INTRAMUSCULAR; INTRAVENOUS AS NEEDED
Status: DISCONTINUED | OUTPATIENT
Start: 2022-02-02 | End: 2022-02-02

## 2022-02-02 RX ORDER — ACETAMINOPHEN 500 MG
1000 TABLET ORAL ONCE AS NEEDED
Status: DISCONTINUED | OUTPATIENT
Start: 2022-02-02 | End: 2022-02-02

## 2022-02-02 RX ORDER — HYDROCODONE BITARTRATE AND ACETAMINOPHEN 5; 325 MG/1; MG/1
1-2 TABLET ORAL EVERY 4 HOURS PRN
Qty: 30 TABLET | Refills: 0 | Status: SHIPPED | OUTPATIENT
Start: 2022-02-02

## 2022-02-02 RX ORDER — DEXAMETHASONE SODIUM PHOSPHATE 4 MG/ML
VIAL (ML) INJECTION AS NEEDED
Status: DISCONTINUED | OUTPATIENT
Start: 2022-02-02 | End: 2022-02-02 | Stop reason: SURG

## 2022-02-02 RX ADMIN — METOCLOPRAMIDE HYDROCHLORIDE 10 MG: 5 INJECTION INTRAMUSCULAR; INTRAVENOUS at 13:05:00

## 2022-02-02 RX ADMIN — MIDAZOLAM HYDROCHLORIDE 2 MG: 1 INJECTION INTRAMUSCULAR; INTRAVENOUS at 12:53:00

## 2022-02-02 RX ADMIN — SODIUM CHLORIDE, SODIUM LACTATE, POTASSIUM CHLORIDE, CALCIUM CHLORIDE: 600; 310; 30; 20 INJECTION, SOLUTION INTRAVENOUS at 15:13:00

## 2022-02-02 RX ADMIN — CEFAZOLIN SODIUM/WATER 2 G: 2 G/20 ML SYRINGE (ML) INTRAVENOUS at 13:05:00

## 2022-02-02 RX ADMIN — SODIUM CHLORIDE, SODIUM LACTATE, POTASSIUM CHLORIDE, CALCIUM CHLORIDE: 600; 310; 30; 20 INJECTION, SOLUTION INTRAVENOUS at 12:53:00

## 2022-02-02 RX ADMIN — KETAMINE HYDROCHLORIDE 25 MG: 50 INJECTION, SOLUTION, CONCENTRATE INTRAMUSCULAR; INTRAVENOUS at 13:10:00

## 2022-02-02 RX ADMIN — DEXAMETHASONE SODIUM PHOSPHATE 4 MG: 4 MG/ML VIAL (ML) INJECTION at 13:05:00

## 2022-02-02 RX ADMIN — ONDANSETRON 4 MG: 2 INJECTION INTRAMUSCULAR; INTRAVENOUS at 13:05:00

## 2022-02-02 RX ADMIN — LIDOCAINE HYDROCHLORIDE 50 MG: 10 INJECTION, SOLUTION EPIDURAL; INFILTRATION; INTRACAUDAL; PERINEURAL at 12:53:00

## 2022-02-02 RX ADMIN — ROCURONIUM BROMIDE 50 MG: 10 INJECTION, SOLUTION INTRAVENOUS at 12:58:00

## 2022-02-02 NOTE — ANESTHESIA PROCEDURE NOTES
Airway  Date/Time: 2/2/2022 12:58 PM  Urgency: elective    Airway not difficult    General Information and Staff    Patient location during procedure: OR  Anesthesiologist: Jennifer Lorenzo MD  Performed: anesthesiologist     Indications and Patient Condition  Indications for airway management: anesthesia  Spontaneous Ventilation: absent  Sedation level: deep  Preoxygenated: yes  Patient position: sniffing  Mask difficulty assessment: 1 - vent by mask    Final Airway Details  Final airway type: endotracheal airway      Successful airway: ETT  Cuffed: yes   Successful intubation technique: Video laryngoscopy  Endotracheal tube insertion site: oral  Blade size: #3  ETT size (mm): 7.0    Cormack-Lehane Classification: grade I - full view of glottis  Placement verified by: chest auscultation and capnometry   Cuff volume (mL): 6  Measured from: lips  ETT to lips (cm): 21  Number of attempts at approach: 1    Additional Comments  VC clean, passed smoothly, atraumatically. OGT and soft bite block to molars. Dentition unchanged.

## 2022-02-02 NOTE — BRIEF OP NOTE
Pre-Operative Diagnosis: Excess skin of abdominal wall [L98.7]     Post-Operative Diagnosis: Excess skin of abdominal wall [L98.7]      Procedure Performed:   Revision of abdominal scar with abdominal, flank liposuction    Surgeon(s) and Role:     Lillard Habermann, MD - Primary    Assistant(s):  PA: WALT Fairbanks     Surgical Findings: as dictated     Specimen: none     Estimated Blood Loss: Blood Output: 20 mL (2/2/2022  2:47 PM)      Riaz Lee PA-C  2/2/2022  3:27 PM

## 2022-02-02 NOTE — H&P
Dr. Jessica Fajardo 1/28/22 surgical clearance H&P reviewed. No interval changes. Informed consent obtained and she wishes to proceed as planned.

## 2022-02-03 NOTE — OPERATIVE REPORT
[address]    PATIENT'S NAME: BHAVNA Scanlon   ATTENDING PHYSICIAN: Kishore Gonzales M.D. OPERATING PHYSICIAN: Kishore Gonzales M.D. PATIENT ACCOUNT#:   [de-identified]    LOCATION:  PACU [address] [place] [address] 7 [facility] 10  MEDICAL RECORD #:   [id number]       YOB: 1975  ADMISSION DATE:       02/02/2022      OPERATION DATE:  02/02/2022    OPERATIVE REPORT    PREOPERATIVE DIAGNOSIS:  Hypertrophic abdominal scar and abdominal lipodystrophy. POSTOPERATIVE DIAGNOSIS:  Hypertrophic abdominal scar and abdominal lipodystrophy. PROCEDURE:  Revision of abdominal scar totaling 40 cm and abdominal liposuction. ASSISTANT:  Nick Epps PA-C. ANESTHESIA:  General.    ESTIMATED BLOOD LOSS:  Minimal.    COMPLICATIONS:  None. INDICATIONS:  Patient is a 43-year-old female who previously underwent abdominoplasty. After sustaining weight gain, she developed spreading of her abdominal incision with intermittent chafing and breakdown. As such, we discussed the option of scar revision. The patient also requested to undergo central abdominal flank liposuction. OPERATIVE TECHNIQUE:  Informed consent was obtained from the patient. The risks, benefits, and alternatives were reviewed with the patient preoperatively. She expressed understanding and wished to proceed. The patient was marked in the preoperative holding area in the upright position. The lower abdominal incision and proposed excision of the abdominal scar were marked. Areas of central abdominal betsy lipodystrophy were marked. The patient was then taken to the operating room, properly identified, placed in the supine position. Sequential compression devices were placed on bilateral lower extremities. Intravenous antibiotic prophylaxis was administered. The patient then underwent successful induction of general anesthesia and endotracheal intubation. The arms were placed abducted on foam-padded arm boards and loosely secured with Kerlix.   The abdomen was then prepped and draped sterilely. The proposed liposuction port sites at the lateral abdominal scar were infiltrated with 1% lidocaine with epinephrine. Stab incisions were then created, and 1 L of tumescent solution was infiltrated into the flanks and central abdomen. After allowing for the tumescent to take effect, liposuction of the central abdomen and flanks was performed using a 3 mm basket cannula followed by a 4 and 5 mm Mercedes tip cannula. A total of 600 mL of lipoaspirate was collected. Next, the scar was excised with a scalpel. Subcutaneous scarring was released with electrocautery. A 15-Samoan Ajith drain was exited through a lateral stab incision and sutured to the skin with a 3-0 nylon suture. The bed was then flexed, and the wound was repaired in a layered fashion with 0 Vicryl suture on Cecy fascia, interrupted 3-0 Vicryl deep dermal suture, and running 4-0 Monocryl subcuticular suture. Biopatch and Tegaderm were placed on the drain site. Exofin and Steri-Strips were placed on the incision. TopiFoam and an abdominal binder were placed. The patient was awakened, extubated, taken to the recovery area in stable condition. There were no intraoperative complications. All needle, sponge, and instrument counts were correct at the end of the procedure. Dictated By Remigio Snider M.D.  d: 02/02/2022 15:56:04  t: 02/02/2022 18:04:49  [facility] [id number]  [facility]/

## 2022-02-11 ENCOUNTER — OFFICE VISIT (OUTPATIENT)
Dept: SURGERY | Facility: CLINIC | Age: 47
End: 2022-02-11
Payer: COMMERCIAL

## 2022-02-11 DIAGNOSIS — L90.5 SCAR OF ABDOMINAL SKIN: Primary | ICD-10-CM

## 2022-02-11 PROCEDURE — 99024 POSTOP FOLLOW-UP VISIT: CPT | Performed by: PHYSICIAN ASSISTANT

## 2022-02-22 ENCOUNTER — OFFICE VISIT (OUTPATIENT)
Dept: SURGERY | Facility: CLINIC | Age: 47
End: 2022-02-22
Payer: COMMERCIAL

## 2022-02-22 DIAGNOSIS — Z98.890 S/P SCAR REVISION: Primary | ICD-10-CM

## 2022-02-22 PROCEDURE — 99024 POSTOP FOLLOW-UP VISIT: CPT | Performed by: SURGERY

## 2022-02-22 NOTE — PROGRESS NOTES
Sergei Quiles is a 51year old female who presents today for a follow-up. She denies fever and chills. She denies nausea, vomiting, diarrhea or constipation. Physical Examination:  Incisions are well-healed. There is no erythema or seroma noted. Assessment and Plan:  Patient is doing well. We discussed beginning gentle scar massage and continue abdominal binder. The patient will follow up in 2 to 3 weeks for scar check and likely begin silicone strips at that time. The plan was reviewed with the patient and questions were answered.

## 2022-05-17 ENCOUNTER — TELEPHONE (OUTPATIENT)
Dept: FAMILY MEDICINE CLINIC | Facility: CLINIC | Age: 47
End: 2022-05-17

## 2022-05-17 NOTE — TELEPHONE ENCOUNTER
Received results from [facility]. Placed in Dr Candida Lucio as Dr Garrett Madden is out of the office.

## 2022-06-08 ENCOUNTER — TELEPHONE (OUTPATIENT)
Dept: FAMILY MEDICINE CLINIC | Facility: CLINIC | Age: 47
End: 2022-06-08

## 2022-10-08 ENCOUNTER — HOSPITAL ENCOUNTER (OUTPATIENT)
Age: 47
Discharge: HOME | End: 2022-10-08
Payer: COMMERCIAL

## 2022-10-08 VITALS
SYSTOLIC BLOOD PRESSURE: 97 MMHG | TEMPERATURE: 98 F | HEART RATE: 84 BPM | OXYGEN SATURATION: 96 % | DIASTOLIC BLOOD PRESSURE: 58 MMHG | RESPIRATION RATE: 14 BRPM

## 2022-10-08 DIAGNOSIS — H66.90 ACUTE OTITIS MEDIA, UNSPECIFIED OTITIS MEDIA TYPE: ICD-10-CM

## 2022-10-08 DIAGNOSIS — H60.91 OTITIS EXTERNA OF RIGHT EAR, UNSPECIFIED CHRONICITY, UNSPECIFIED TYPE: Primary | ICD-10-CM

## 2022-10-08 PROCEDURE — 99203 OFFICE O/P NEW LOW 30 MIN: CPT

## 2022-10-08 PROCEDURE — 99213 OFFICE O/P EST LOW 20 MIN: CPT

## 2022-10-08 RX ORDER — CIPROFLOXACIN AND DEXAMETHASONE 3; 1 MG/ML; MG/ML
4 SUSPENSION/ DROPS AURICULAR (OTIC) 2 TIMES DAILY
Qty: 7.5 ML | Refills: 0 | Status: SHIPPED | OUTPATIENT
Start: 2022-10-08 | End: 2022-10-15

## 2022-10-08 RX ORDER — AMOXICILLIN AND CLAVULANATE POTASSIUM 875; 125 MG/1; MG/1
1 TABLET, FILM COATED ORAL 2 TIMES DAILY
Qty: 14 TABLET | Refills: 0 | Status: SHIPPED | OUTPATIENT
Start: 2022-10-08 | End: 2022-10-15

## 2022-10-08 NOTE — ED INITIAL ASSESSMENT (HPI)
Patient reports she has been having problems with her right ear for about 2 days. Patient reports it is now tender to touch.

## 2022-11-30 ENCOUNTER — HOSPITAL ENCOUNTER (OUTPATIENT)
Age: 47
Discharge: HOME | End: 2022-11-30
Payer: COMMERCIAL

## 2022-11-30 VITALS
RESPIRATION RATE: 16 BRPM | TEMPERATURE: 98 F | HEART RATE: 105 BPM | HEIGHT: 66 IN | BODY MASS INDEX: 26.03 KG/M2 | SYSTOLIC BLOOD PRESSURE: 108 MMHG | DIASTOLIC BLOOD PRESSURE: 64 MMHG | WEIGHT: 162 LBS | OXYGEN SATURATION: 96 %

## 2022-11-30 DIAGNOSIS — J11.1 INFLUENZA: Primary | ICD-10-CM

## 2022-11-30 LAB
POCT INFLUENZA A: POSITIVE
POCT INFLUENZA B: NEGATIVE
SARS-COV-2 RNA RESP QL NAA+PROBE: NOT DETECTED

## 2022-11-30 PROCEDURE — 87502 INFLUENZA DNA AMP PROBE: CPT | Performed by: NURSE PRACTITIONER

## 2022-11-30 PROCEDURE — 99213 OFFICE O/P EST LOW 20 MIN: CPT

## 2022-11-30 PROCEDURE — 99212 OFFICE O/P EST SF 10 MIN: CPT

## 2023-01-11 NOTE — TELEPHONE ENCOUNTER
Patient states she has an appt scheduled for an US with Kumar Wilson for tomorrow. She states she was unaware of the need for a bx. She will inquire about this tomorrow.
Received [facility] breast results- I am not sure who ordered this and if it was just an FYI to us or if they were needing addt orders.   It was recommended that she have MRI directed ultrasound/ biopsy of right breast.   Can we verify that she has further ev
normal...

## 2023-04-05 ENCOUNTER — OFFICE VISIT (OUTPATIENT)
Dept: FAMILY MEDICINE CLINIC | Facility: CLINIC | Age: 48
End: 2023-04-05
Payer: COMMERCIAL

## 2023-04-05 VITALS
HEIGHT: 65.5 IN | BODY MASS INDEX: 26.56 KG/M2 | DIASTOLIC BLOOD PRESSURE: 66 MMHG | HEART RATE: 68 BPM | RESPIRATION RATE: 14 BRPM | WEIGHT: 161.38 LBS | SYSTOLIC BLOOD PRESSURE: 116 MMHG

## 2023-04-05 DIAGNOSIS — R73.9 HYPERGLYCEMIA: ICD-10-CM

## 2023-04-05 DIAGNOSIS — Z12.31 VISIT FOR SCREENING MAMMOGRAM: ICD-10-CM

## 2023-04-05 DIAGNOSIS — K14.6 TONGUE PAIN: Primary | ICD-10-CM

## 2023-04-05 DIAGNOSIS — R68.2 DRY MOUTH: ICD-10-CM

## 2023-04-05 DIAGNOSIS — Z00.00 LABORATORY EXAMINATION ORDERED AS PART OF A ROUTINE GENERAL MEDICAL EXAMINATION: ICD-10-CM

## 2023-04-05 PROCEDURE — 99213 OFFICE O/P EST LOW 20 MIN: CPT | Performed by: FAMILY MEDICINE

## 2023-04-05 PROCEDURE — 3008F BODY MASS INDEX DOCD: CPT | Performed by: FAMILY MEDICINE

## 2023-04-05 PROCEDURE — 3078F DIAST BP <80 MM HG: CPT | Performed by: FAMILY MEDICINE

## 2023-04-05 PROCEDURE — 3074F SYST BP LT 130 MM HG: CPT | Performed by: FAMILY MEDICINE

## 2023-04-05 RX ORDER — DESVENLAFAXINE 100 MG/1
TABLET, EXTENDED RELEASE ORAL
COMMUNITY
Start: 2023-04-04

## 2023-04-05 RX ORDER — GABAPENTIN 100 MG/1
100 CAPSULE ORAL 3 TIMES DAILY
Qty: 90 CAPSULE | Refills: 1 | Status: SHIPPED | OUTPATIENT
Start: 2023-04-05

## 2023-04-12 ENCOUNTER — LAB ENCOUNTER (OUTPATIENT)
Dept: LAB | Age: 48
End: 2023-04-12
Attending: FAMILY MEDICINE
Payer: COMMERCIAL

## 2023-04-12 DIAGNOSIS — R73.9 HYPERGLYCEMIA: ICD-10-CM

## 2023-04-12 DIAGNOSIS — Z00.00 LABORATORY EXAMINATION ORDERED AS PART OF A ROUTINE GENERAL MEDICAL EXAMINATION: ICD-10-CM

## 2023-04-12 DIAGNOSIS — R68.2 DRY MOUTH: ICD-10-CM

## 2023-04-12 LAB
ALBUMIN SERPL-MCNC: 3.7 G/DL (ref 3.4–5)
ALBUMIN/GLOB SERPL: 1 {RATIO} (ref 1–2)
ALP LIVER SERPL-CCNC: 64 U/L
ALT SERPL-CCNC: 47 U/L
ANION GAP SERPL CALC-SCNC: 2 MMOL/L (ref 0–18)
AST SERPL-CCNC: 17 U/L (ref 15–37)
BILIRUB SERPL-MCNC: 0.3 MG/DL (ref 0.1–2)
BUN BLD-MCNC: 12 MG/DL (ref 7–18)
CALCIUM BLD-MCNC: 9.3 MG/DL (ref 8.5–10.1)
CHLORIDE SERPL-SCNC: 108 MMOL/L (ref 98–112)
CHOLEST SERPL-MCNC: 157 MG/DL (ref ?–200)
CO2 SERPL-SCNC: 28 MMOL/L (ref 21–32)
CREAT BLD-MCNC: 0.75 MG/DL
CRP SERPL-MCNC: <0.29 MG/DL (ref ?–0.3)
ERYTHROCYTE [DISTWIDTH] IN BLOOD BY AUTOMATED COUNT: 12.2 %
ERYTHROCYTE [SEDIMENTATION RATE] IN BLOOD: 27 MM/HR
EST. AVERAGE GLUCOSE BLD GHB EST-MCNC: 111 MG/DL (ref 68–126)
FASTING PATIENT LIPID ANSWER: YES
FASTING STATUS PATIENT QL REPORTED: YES
FOLATE SERPL-MCNC: 15.1 NG/ML (ref 8.7–?)
GFR SERPLBLD BASED ON 1.73 SQ M-ARVRAT: 98 ML/MIN/1.73M2 (ref 60–?)
GLOBULIN PLAS-MCNC: 3.8 G/DL (ref 2.8–4.4)
GLUCOSE BLD-MCNC: 97 MG/DL (ref 70–99)
HBA1C MFR BLD: 5.5 % (ref ?–5.7)
HCT VFR BLD AUTO: 36.1 %
HDLC SERPL-MCNC: 44 MG/DL (ref 40–59)
HGB BLD-MCNC: 12 G/DL
LDLC SERPL CALC-MCNC: 94 MG/DL (ref ?–100)
MCH RBC QN AUTO: 30.8 PG (ref 26–34)
MCHC RBC AUTO-ENTMCNC: 33.2 G/DL (ref 31–37)
MCV RBC AUTO: 92.8 FL
NONHDLC SERPL-MCNC: 113 MG/DL (ref ?–130)
OSMOLALITY SERPL CALC.SUM OF ELEC: 286 MOSM/KG (ref 275–295)
PLATELET # BLD AUTO: 402 10(3)UL (ref 150–450)
POTASSIUM SERPL-SCNC: 4.4 MMOL/L (ref 3.5–5.1)
PROT SERPL-MCNC: 7.5 G/DL (ref 6.4–8.2)
RBC # BLD AUTO: 3.89 X10(6)UL
SODIUM SERPL-SCNC: 138 MMOL/L (ref 136–145)
TRIGL SERPL-MCNC: 102 MG/DL (ref 30–149)
TSI SER-ACNC: 1.73 MIU/ML (ref 0.36–3.74)
VIT B12 SERPL-MCNC: 699 PG/ML (ref 193–986)
VLDLC SERPL CALC-MCNC: 17 MG/DL (ref 0–30)
WBC # BLD AUTO: 6 X10(3) UL (ref 4–11)

## 2023-04-12 PROCEDURE — 85027 COMPLETE CBC AUTOMATED: CPT

## 2023-04-12 PROCEDURE — 82746 ASSAY OF FOLIC ACID SERUM: CPT

## 2023-04-12 PROCEDURE — 36415 COLL VENOUS BLD VENIPUNCTURE: CPT

## 2023-04-12 PROCEDURE — 86140 C-REACTIVE PROTEIN: CPT

## 2023-04-12 PROCEDURE — 85652 RBC SED RATE AUTOMATED: CPT

## 2023-04-12 PROCEDURE — 83036 HEMOGLOBIN GLYCOSYLATED A1C: CPT

## 2023-04-12 PROCEDURE — 86038 ANTINUCLEAR ANTIBODIES: CPT

## 2023-04-12 PROCEDURE — 86225 DNA ANTIBODY NATIVE: CPT

## 2023-04-12 PROCEDURE — 84443 ASSAY THYROID STIM HORMONE: CPT

## 2023-04-12 PROCEDURE — 82607 VITAMIN B-12: CPT

## 2023-04-12 PROCEDURE — 80061 LIPID PANEL: CPT

## 2023-04-12 PROCEDURE — 80053 COMPREHEN METABOLIC PANEL: CPT

## 2023-04-14 LAB
DSDNA IGG SERPL IA-ACNC: 1.7 IU/ML
ENA AB SER QL IA: 0.3 UG/L
ENA AB SER QL IA: NEGATIVE

## 2023-04-16 ENCOUNTER — PATIENT MESSAGE (OUTPATIENT)
Dept: FAMILY MEDICINE CLINIC | Facility: CLINIC | Age: 48
End: 2023-04-16

## 2023-04-16 DIAGNOSIS — K14.6 TONGUE PAIN: ICD-10-CM

## 2023-04-17 NOTE — TELEPHONE ENCOUNTER
From: Nargis Cook  To: Jerry Chavis MD  Sent: 4/16/2023 8:22 PM CDT  Subject: Tongue soreness    Hi Dr. Royce Ellis- Thank you for your message attached with my blood results. I've been taking the Gabapentin 3x daily (100mg per time), my tongue is still having the burning and numbing sensation. The pain has gotten better though. Should I continue on the same dosage as I am on now?   Thank you

## 2023-04-24 RX ORDER — GABAPENTIN 300 MG/1
300 CAPSULE ORAL 3 TIMES DAILY
Qty: 270 CAPSULE | Refills: 1 | Status: SHIPPED | OUTPATIENT
Start: 2023-04-24 | End: 2023-05-22

## 2023-05-22 RX ORDER — GABAPENTIN 600 MG/1
600 TABLET ORAL 3 TIMES DAILY
Qty: 270 TABLET | Refills: 3 | Status: SHIPPED | OUTPATIENT
Start: 2023-05-22 | End: 2023-05-24

## 2023-05-24 RX ORDER — GABAPENTIN 600 MG/1
600 TABLET ORAL 4 TIMES DAILY
Qty: 360 TABLET | Refills: 3 | Status: SHIPPED | OUTPATIENT
Start: 2023-05-24

## 2023-06-07 DIAGNOSIS — K14.6 TONGUE PAIN: ICD-10-CM

## 2023-06-12 ENCOUNTER — TELEPHONE (OUTPATIENT)
Dept: FAMILY MEDICINE CLINIC | Facility: CLINIC | Age: 48
End: 2023-06-12

## 2023-06-12 NOTE — TELEPHONE ENCOUNTER
Received results from imaging that was ordered via fax from [facility].   Placed in Dr. Mohsen Greenwood

## 2023-06-13 RX ORDER — GABAPENTIN 100 MG/1
CAPSULE ORAL
Qty: 90 CAPSULE | Refills: 1 | OUTPATIENT
Start: 2023-06-13

## 2023-06-30 ENCOUNTER — HOSPITAL ENCOUNTER (OUTPATIENT)
Age: 48
Discharge: HOME | End: 2023-06-30
Payer: COMMERCIAL

## 2023-06-30 ENCOUNTER — APPOINTMENT (OUTPATIENT)
Dept: GENERAL RADIOLOGY | Age: 48
End: 2023-06-30
Attending: NURSE PRACTITIONER
Payer: COMMERCIAL

## 2023-06-30 VITALS
WEIGHT: 170 LBS | BODY MASS INDEX: 28 KG/M2 | RESPIRATION RATE: 18 BRPM | OXYGEN SATURATION: 97 % | SYSTOLIC BLOOD PRESSURE: 121 MMHG | DIASTOLIC BLOOD PRESSURE: 78 MMHG | TEMPERATURE: 98 F | HEART RATE: 115 BPM

## 2023-06-30 DIAGNOSIS — S63.601A SPRAIN OF RIGHT THUMB, UNSPECIFIED SITE OF DIGIT, INITIAL ENCOUNTER: Primary | ICD-10-CM

## 2023-06-30 PROCEDURE — 99213 OFFICE O/P EST LOW 20 MIN: CPT

## 2023-06-30 PROCEDURE — 73140 X-RAY EXAM OF FINGER(S): CPT | Performed by: NURSE PRACTITIONER

## 2023-06-30 RX ORDER — METHYLPREDNISOLONE 4 MG/1
TABLET ORAL
Qty: 1 EACH | Refills: 0 | Status: SHIPPED | OUTPATIENT
Start: 2023-06-30

## 2023-06-30 NOTE — ED INITIAL ASSESSMENT (HPI)
For 2 weeks, c/o right thumb pain/swelling. States she often writes with a pen but is unsure of cause. Denies injury. States she \"pops\" her knuckles.

## 2023-07-10 ENCOUNTER — TELEPHONE (OUTPATIENT)
Dept: FAMILY MEDICINE CLINIC | Facility: CLINIC | Age: 48
End: 2023-07-10

## 2023-07-10 NOTE — TELEPHONE ENCOUNTER
Please enter lab orders for the patient's upcoming physical appointment. Physical scheduled: Your appointments       Date & Time Appointment Department [facility])    Aug 16, 2023 12:00 PM CDT Physical - Established with Damaris Coon MD [employer], [address], [place] ([address])              Linus Bliss [address] [phone]           Preferred lab: [facility])     The patient has been notified to complete fasting labs prior to their physical appointment.

## 2023-08-02 ENCOUNTER — TELEPHONE (OUTPATIENT)
Dept: FAMILY MEDICINE CLINIC | Facility: CLINIC | Age: 48
End: 2023-08-02

## 2023-08-02 NOTE — TELEPHONE ENCOUNTER
Please enter lab orders for the patient's upcoming physical appointment. Physical scheduled: Your appointments       Date & Time Appointment Department [facility])    Aug 16, 2023 12:00 PM CDT Physical - Established with Elizabeth Snyder MD [address], [address], [place] ([address])              Drew Kelsey [facility] [address] [id number]           Preferred lab: [facility])     The patient has been notified to complete fasting labs prior to their physical appointment.

## 2023-08-29 ENCOUNTER — OFFICE VISIT (OUTPATIENT)
Facility: LOCATION | Age: 48
End: 2023-08-29
Payer: COMMERCIAL

## 2023-08-29 DIAGNOSIS — K14.0 GLOSSITIS: Primary | ICD-10-CM

## 2023-08-29 PROCEDURE — 99203 OFFICE O/P NEW LOW 30 MIN: CPT | Performed by: OTOLARYNGOLOGY

## 2023-08-29 RX ORDER — CLOTRIMAZOLE 10 MG/1
10 LOZENGE ORAL; TOPICAL 4 TIMES DAILY
Qty: 40 LOZENGE | Refills: 1 | Status: SHIPPED | OUTPATIENT
Start: 2023-08-29

## 2023-09-06 ENCOUNTER — PATIENT MESSAGE (OUTPATIENT)
Dept: FAMILY MEDICINE CLINIC | Facility: CLINIC | Age: 48
End: 2023-09-06

## 2023-09-06 DIAGNOSIS — M19.042 ARTHRITIS OF BOTH HANDS: Primary | ICD-10-CM

## 2023-09-06 DIAGNOSIS — M19.041 ARTHRITIS OF BOTH HANDS: Primary | ICD-10-CM

## 2023-09-06 NOTE — TELEPHONE ENCOUNTER
1. Arthritis of both hands (Primary)  -     RHEUMATOLOGY - INTERNAL       OK to notify.  Thanks, Sulema Tesfaye MD

## 2023-09-06 NOTE — TELEPHONE ENCOUNTER
From: Etelvina Hankins  To: Yumiko Meredith MD  Sent: 9/6/2023 9:19 AM CDT  Subject: Referal    Hi Dr. Aurea Dennison- I went into immediate care a couple months back to see if I had broke me thumb, when they did x-rays, my thumb was just sprang, but they did notice arthritis around my knuckle. They suggested I follow up with a Rheumatologist, to see the extent of the issue. My maternal grandmother had rheumatoid arthritis. Could you give me a referral to who you think I should see?   Thank you, Rita

## 2023-09-20 NOTE — ASSESSMENT & PLAN NOTE
Last A1c value was 5.5% done 4/12/2023. Sugar control is well controlled, no significant medication side effects noted, Pre-Diabetic. Not currently on Diabetic meds.

## 2023-09-20 NOTE — ASSESSMENT & PLAN NOTE
Cholesterol shows Good control. Long term heart-healthy diet and lifestyle discussed and encouraged to reduce risk of cardiovascular disease. Cholesterol: 157, done on 4/12/2023. HDL Cholesterol: 44, done on 4/12/2023. TriGlycerides 102, done on 4/12/2023. LDL Cholesterol: 94, done on 4/12/2023. No current Cholesterol medication.

## 2023-09-20 NOTE — ASSESSMENT & PLAN NOTE
ADHD shows Good control.   Weight trend: has been stable  Anxiolytic Meds: amphetamine-dextroamphetamine ER Cp24 - 30 MG; amphetamine-dextroamphetamine Tabs - 30 MG  Anxiolytic Meds: buPROPion ER Tb24 - 150 MG

## 2023-09-21 ENCOUNTER — OFFICE VISIT (OUTPATIENT)
Dept: FAMILY MEDICINE CLINIC | Facility: CLINIC | Age: 48
End: 2023-09-21
Payer: COMMERCIAL

## 2023-09-21 VITALS
HEIGHT: 65 IN | BODY MASS INDEX: 29.32 KG/M2 | SYSTOLIC BLOOD PRESSURE: 120 MMHG | WEIGHT: 176 LBS | HEART RATE: 80 BPM | RESPIRATION RATE: 16 BRPM | DIASTOLIC BLOOD PRESSURE: 78 MMHG

## 2023-09-21 DIAGNOSIS — Z00.00 ANNUAL PHYSICAL EXAM: Primary | ICD-10-CM

## 2023-09-21 DIAGNOSIS — Z12.11 SCREEN FOR COLON CANCER: ICD-10-CM

## 2023-09-21 DIAGNOSIS — E78.6 LOW HDL (UNDER 40): ICD-10-CM

## 2023-09-21 DIAGNOSIS — F90.2 ATTENTION DEFICIT HYPERACTIVITY DISORDER (ADHD), COMBINED TYPE: ICD-10-CM

## 2023-09-21 DIAGNOSIS — R73.03 PREDIABETES: ICD-10-CM

## 2023-09-21 PROCEDURE — 99396 PREV VISIT EST AGE 40-64: CPT | Performed by: FAMILY MEDICINE

## 2023-09-21 PROCEDURE — 3074F SYST BP LT 130 MM HG: CPT | Performed by: FAMILY MEDICINE

## 2023-09-21 PROCEDURE — 3078F DIAST BP <80 MM HG: CPT | Performed by: FAMILY MEDICINE

## 2023-09-21 PROCEDURE — 3008F BODY MASS INDEX DOCD: CPT | Performed by: FAMILY MEDICINE

## 2023-09-21 RX ORDER — PREGABALIN 75 MG/1
75 CAPSULE ORAL 2 TIMES DAILY
Qty: 90 CAPSULE | Refills: 1 | Status: SHIPPED | OUTPATIENT
Start: 2023-09-21

## 2023-09-21 RX ORDER — VALACYCLOVIR HYDROCHLORIDE 1 G/1
2000 TABLET, FILM COATED ORAL EVERY 12 HOURS SCHEDULED
Qty: 16 TABLET | Refills: 1 | Status: SHIPPED | OUTPATIENT
Start: 2023-09-21

## 2023-09-21 RX ORDER — PENCICLOVIR 10 MG/G
1 CREAM TOPICAL DAILY
Qty: 5 G | Refills: 1 | Status: SHIPPED | OUTPATIENT
Start: 2023-09-21 | End: 2023-09-29

## 2023-09-27 ENCOUNTER — PATIENT MESSAGE (OUTPATIENT)
Facility: LOCATION | Age: 48
End: 2023-09-27

## 2023-09-27 NOTE — TELEPHONE ENCOUNTER
From: Etelvina Hankins  To: Esteban Officer  Sent: 9/27/2023 11:00 AM CDT  Subject: Tongue soreness     Hi Dr. Amanda Barrios- I have made an appointment with a rheumatologist that Dr. Aurea Dennison referred me to, but I cannot get in until Dec 26! Ugh! The lozenges you had me taking were numbing my tongue, now since the script is out it is back to the burning sensation. Can I stay on the lozenges or is that a prescription for just a short amount of time?    Lazara Lofton says toña :)

## 2023-09-29 RX ORDER — CLOTRIMAZOLE 10 MG/1
10 LOZENGE ORAL; TOPICAL 4 TIMES DAILY
Qty: 60 LOZENGE | Refills: 2 | Status: SHIPPED | OUTPATIENT
Start: 2023-09-29

## 2023-11-10 NOTE — TELEPHONE ENCOUNTER
Requested Prescriptions     Pending Prescriptions Disp Refills    CLOTRIMAZOLE 10 MG Mouth/Throat Michelle [Pharmacy Med Name: CLOTRIMAZOLE 10MG LOZENGE] 60 Michelle 0     Sig: TAKE ONE LOZENGE BY MOUTH FOUR TIMES DAILY     FILLED- 9/29/23  LOV- 8/29/23    No f/u scheduled

## 2023-11-11 RX ORDER — CLOTRIMAZOLE 10 MG/1
LOZENGE ORAL; TOPICAL
Qty: 60 TROCHE | Refills: 0 | Status: SHIPPED | OUTPATIENT
Start: 2023-11-11

## 2023-11-17 ENCOUNTER — OFFICE VISIT (OUTPATIENT)
Dept: FAMILY MEDICINE CLINIC | Facility: CLINIC | Age: 48
End: 2023-11-17
Payer: COMMERCIAL

## 2023-11-17 VITALS
BODY MASS INDEX: 28 KG/M2 | RESPIRATION RATE: 18 BRPM | TEMPERATURE: 97 F | WEIGHT: 169.63 LBS | DIASTOLIC BLOOD PRESSURE: 76 MMHG | SYSTOLIC BLOOD PRESSURE: 118 MMHG | HEART RATE: 92 BPM

## 2023-11-17 DIAGNOSIS — M25.549 ARTHRALGIA OF HAND, UNSPECIFIED LATERALITY: Primary | ICD-10-CM

## 2023-11-17 DIAGNOSIS — M65.4 TENOSYNOVITIS, DE QUERVAIN: ICD-10-CM

## 2023-11-17 PROCEDURE — 3074F SYST BP LT 130 MM HG: CPT | Performed by: PHYSICIAN ASSISTANT

## 2023-11-17 PROCEDURE — 3078F DIAST BP <80 MM HG: CPT | Performed by: PHYSICIAN ASSISTANT

## 2023-11-17 PROCEDURE — 99214 OFFICE O/P EST MOD 30 MIN: CPT | Performed by: PHYSICIAN ASSISTANT

## 2023-11-18 ENCOUNTER — LAB ENCOUNTER (OUTPATIENT)
Dept: LAB | Age: 48
End: 2023-11-18
Attending: PHYSICIAN ASSISTANT
Payer: COMMERCIAL

## 2023-11-18 DIAGNOSIS — R68.2 DRY MOUTH: ICD-10-CM

## 2023-11-18 DIAGNOSIS — M25.549 ARTHRALGIA OF HAND, UNSPECIFIED LATERALITY: ICD-10-CM

## 2023-11-18 LAB
RHEUMATOID FACT SERPL-ACNC: <10 IU/ML (ref ?–15)
VIT D+METAB SERPL-MCNC: 31.8 NG/ML (ref 30–100)

## 2023-11-18 PROCEDURE — 86200 CCP ANTIBODY: CPT

## 2023-11-18 PROCEDURE — 36415 COLL VENOUS BLD VENIPUNCTURE: CPT

## 2023-11-18 PROCEDURE — 82306 VITAMIN D 25 HYDROXY: CPT

## 2023-11-18 PROCEDURE — 86431 RHEUMATOID FACTOR QUANT: CPT

## 2023-11-20 ENCOUNTER — TELEPHONE (OUTPATIENT)
Dept: FAMILY MEDICINE CLINIC | Facility: CLINIC | Age: 48
End: 2023-11-20

## 2023-11-20 NOTE — TELEPHONE ENCOUNTER
Patient calling to check status on lab work.  Notified patient that one test is still pending and as soon as Rozelle Sender is able to review tests we will call back with more information

## 2023-11-20 NOTE — TELEPHONE ENCOUNTER
Patient calling back for Bessie Aguirre or nurse to call back regarding her test result on her blood work that she just had .

## 2023-11-21 LAB — CCP IGG SERPL-ACNC: 2.1 U/ML (ref 0–6.9)

## 2023-11-27 RX ORDER — CLOTRIMAZOLE 10 MG/1
10 LOZENGE ORAL; TOPICAL 4 TIMES DAILY
Qty: 60 TROCHE | Refills: 0 | Status: SHIPPED | OUTPATIENT
Start: 2023-11-27

## 2023-11-27 NOTE — TELEPHONE ENCOUNTER
Requested Prescriptions     Pending Prescriptions Disp Refills    CLOTRIMAZOLE 10 MG Mouth/Throat Michelle [Pharmacy Med Name: CLOTRIMAZOLE 10MG LOZENGE] 60 Michelle 0     Sig: TAKE 1 LOZENGE BY MOUTH FOUR TIMES DAILY     FILLED- 11/11/23  LOV- 8/29/23    No f/u scheduled

## 2023-12-06 ENCOUNTER — OFFICE VISIT (OUTPATIENT)
Dept: FAMILY MEDICINE CLINIC | Facility: CLINIC | Age: 48
End: 2023-12-06
Payer: COMMERCIAL

## 2023-12-06 VITALS
RESPIRATION RATE: 16 BRPM | SYSTOLIC BLOOD PRESSURE: 100 MMHG | HEART RATE: 84 BPM | BODY MASS INDEX: 28 KG/M2 | TEMPERATURE: 97 F | WEIGHT: 168 LBS | DIASTOLIC BLOOD PRESSURE: 68 MMHG

## 2023-12-06 DIAGNOSIS — M65.4 TENOSYNOVITIS, DE QUERVAIN: Primary | ICD-10-CM

## 2023-12-06 RX ORDER — SODIUM FLUORIDE 6.1 MG/ML
GEL, DENTIFRICE DENTAL
COMMUNITY
Start: 2023-11-20

## 2023-12-07 ENCOUNTER — TELEPHONE (OUTPATIENT)
Dept: ORTHOPEDICS CLINIC | Facility: CLINIC | Age: 48
End: 2023-12-07

## 2023-12-07 NOTE — TELEPHONE ENCOUNTER
Pt coming in for rt hand Tenosynovitis, de Quervain. Imaging in epic. Please advise if any additional imaging is needed. Thanks!      Future Appointments   Date Time Provider Ilan Marc   12/26/2023 10:40 AM William Latif MD [id number] [address]   1/11/2024 10:20 AM WALT Hale EMG ORTHO 75 [facility]   1/19/2024  8:30 AM Britt Gowers, [facility] E[facility] SUB GI   2/29/2024  9:55 AM [facility] [place]   3/28/2024  7:45 AM Linda Damico MD EMG 3 [facility] [address]

## 2023-12-12 RX ORDER — CLOTRIMAZOLE 10 MG/1
10 LOZENGE ORAL; TOPICAL 4 TIMES DAILY
Qty: 60 TROCHE | Refills: 0 | Status: SHIPPED | OUTPATIENT
Start: 2023-12-12

## 2023-12-12 NOTE — TELEPHONE ENCOUNTER
Requested Prescriptions     Pending Prescriptions Disp Refills    CLOTRIMAZOLE 10 MG Mouth/Throat Michelle [Pharmacy Med Name: CLOTRIMAZOLE 10MG LOZENGE] 60 Michelle 0     Sig: DISSOLVE 1 LOZENGE BY MOUTH FOUR TIMES DAILY     FILLED- 11/27/23  LOV- 12/10/11    No f/u scheduled

## 2023-12-26 ENCOUNTER — HOSPITAL ENCOUNTER (OUTPATIENT)
Dept: GENERAL RADIOLOGY | Age: 48
Discharge: HOME | End: 2023-12-26
Attending: INTERNAL MEDICINE
Payer: COMMERCIAL

## 2023-12-26 ENCOUNTER — TELEPHONE (OUTPATIENT)
Dept: RHEUMATOLOGY | Facility: CLINIC | Age: 48
End: 2023-12-26

## 2023-12-26 ENCOUNTER — OFFICE VISIT (OUTPATIENT)
Dept: RHEUMATOLOGY | Facility: CLINIC | Age: 48
End: 2023-12-26
Payer: COMMERCIAL

## 2023-12-26 ENCOUNTER — LAB ENCOUNTER (OUTPATIENT)
Dept: LAB | Age: 48
End: 2023-12-26
Attending: INTERNAL MEDICINE
Payer: COMMERCIAL

## 2023-12-26 VITALS
RESPIRATION RATE: 16 BRPM | HEART RATE: 106 BPM | OXYGEN SATURATION: 99 % | BODY MASS INDEX: 26.2 KG/M2 | SYSTOLIC BLOOD PRESSURE: 114 MMHG | TEMPERATURE: 97 F | WEIGHT: 163 LBS | DIASTOLIC BLOOD PRESSURE: 72 MMHG | HEIGHT: 66 IN

## 2023-12-26 DIAGNOSIS — G56.01 CARPAL TUNNEL SYNDROME OF RIGHT WRIST: ICD-10-CM

## 2023-12-26 DIAGNOSIS — M79.7 FIBROMYALGIA: ICD-10-CM

## 2023-12-26 DIAGNOSIS — M15.9 PRIMARY OSTEOARTHRITIS INVOLVING MULTIPLE JOINTS: Primary | ICD-10-CM

## 2023-12-26 DIAGNOSIS — M15.9 PRIMARY OSTEOARTHRITIS INVOLVING MULTIPLE JOINTS: ICD-10-CM

## 2023-12-26 PROBLEM — G56.00 CARPAL TUNNEL SYNDROME: Status: ACTIVE | Noted: 2023-12-26

## 2023-12-26 PROBLEM — M15.0 PRIMARY OSTEOARTHRITIS INVOLVING MULTIPLE JOINTS: Status: ACTIVE | Noted: 2023-12-26

## 2023-12-26 LAB
ERYTHROCYTE [SEDIMENTATION RATE] IN BLOOD: 20 MM/HR
THYROGLOB SERPL-MCNC: <15 U/ML (ref ?–60)
THYROPEROXIDASE AB SERPL-ACNC: 29 U/ML (ref ?–60)
URATE SERPL-MCNC: 3 MG/DL
VIT B12 SERPL-MCNC: 332 PG/ML (ref 193–986)
VIT D+METAB SERPL-MCNC: 32.4 NG/ML (ref 30–100)

## 2023-12-26 PROCEDURE — 72110 X-RAY EXAM L-2 SPINE 4/>VWS: CPT | Performed by: INTERNAL MEDICINE

## 2023-12-26 PROCEDURE — 86235 NUCLEAR ANTIGEN ANTIBODY: CPT

## 2023-12-26 PROCEDURE — 84165 PROTEIN E-PHORESIS SERUM: CPT

## 2023-12-26 PROCEDURE — 86225 DNA ANTIBODY NATIVE: CPT

## 2023-12-26 PROCEDURE — 85652 RBC SED RATE AUTOMATED: CPT

## 2023-12-26 PROCEDURE — 3074F SYST BP LT 130 MM HG: CPT | Performed by: INTERNAL MEDICINE

## 2023-12-26 PROCEDURE — 86334 IMMUNOFIX E-PHORESIS SERUM: CPT

## 2023-12-26 PROCEDURE — 72040 X-RAY EXAM NECK SPINE 2-3 VW: CPT | Performed by: INTERNAL MEDICINE

## 2023-12-26 PROCEDURE — 3078F DIAST BP <80 MM HG: CPT | Performed by: INTERNAL MEDICINE

## 2023-12-26 PROCEDURE — 82306 VITAMIN D 25 HYDROXY: CPT

## 2023-12-26 PROCEDURE — 86800 THYROGLOBULIN ANTIBODY: CPT

## 2023-12-26 PROCEDURE — 3008F BODY MASS INDEX DOCD: CPT | Performed by: INTERNAL MEDICINE

## 2023-12-26 PROCEDURE — 84207 ASSAY OF VITAMIN B-6: CPT

## 2023-12-26 PROCEDURE — 82607 VITAMIN B-12: CPT

## 2023-12-26 PROCEDURE — 36415 COLL VENOUS BLD VENIPUNCTURE: CPT

## 2023-12-26 PROCEDURE — 86376 MICROSOMAL ANTIBODY EACH: CPT

## 2023-12-26 PROCEDURE — 87522 HEPATITIS C REVRS TRNSCRPJ: CPT

## 2023-12-26 PROCEDURE — 83521 IG LIGHT CHAINS FREE EACH: CPT

## 2023-12-26 PROCEDURE — 84550 ASSAY OF BLOOD/URIC ACID: CPT

## 2023-12-26 PROCEDURE — 99205 OFFICE O/P NEW HI 60 MIN: CPT | Performed by: INTERNAL MEDICINE

## 2023-12-26 PROCEDURE — 72072 X-RAY EXAM THORAC SPINE 3VWS: CPT | Performed by: INTERNAL MEDICINE

## 2023-12-26 RX ORDER — MELOXICAM 15 MG/1
15 TABLET ORAL DAILY
Qty: 30 TABLET | Refills: 2 | Status: SHIPPED | OUTPATIENT
Start: 2023-12-26

## 2023-12-26 NOTE — PATIENT INSTRUCTIONS
OSTEOARTHRITIS    Fast Facts    Though some of the joint changes are irreversible, most patients will not need joint replacement surgery. OA symptoms (what you feel) can vary greatly among patients. A rheumatologist can detect arthritis and prescribe the proper treatment. The goal of treatment in OA is to reduce pain and improve function. Exercise is an important part of OA treatment, because it can decrease joint pain and improve function. At present, there is no treatment that can reverse the damage of OA in the joints. Researchers are trying to find ways to slow or reverse this joint damage. Osteoarthritis (also known as OA) is a common joint disease that most often affects middle-age to elderly people. It is commonly referred to as \"wear and tear\" of the joints, but we now know that OA is a disease of the entire joint, involving the cartilage, joint lining, ligaments, and bone. Although it is more common in older people, it is not really accurate to say that the joints are just \"wearing out. \" It is characterized by breakdown of the cartilage (the tissue that cushions the ends of the bones between joints), bony changes of the joints, deterioration of tendons and ligaments, and various degrees of inflammation of the joint lining (called the synovium). This arthritis tends to occur in the hand joints, spine, hips, knees, and great toes. The lifetime risk of developing OA of the knee is about 46%, and the lifetime risk of developing OA of the hip is 25%, according to the [facility], a long-term study from the [address] [place] [address] and sponsored by [employer] for [employer] (often called the [place]) and the [place]. OA is a top cause of disability in older people. The goal of osteoarthritis treatment is to reduce pain and improve function.  There is no cure for the disease, but some treatments attempt to slow disease progression. What is osteoarthritis? OA is a frequently slowly progressive joint disease typically seen in middle-aged to elderly people. In osteoarthritis, the cartilage between the bones in the joint breaks down. This causes the affected bones to slowly get bigger. The joint cartilage often breaks down because of mechanical stress or biochemical changes within the body, causing the bone underneath to fail. OA can occur together with other types of arthritis, such as gout or rheumatoid arthritis. OA tends to affect commonly used joints such as the hands and spine, and the weight-bearing joints such as the hips and knees. Symptoms include:    Joint pain and stiffness    Knobby swelling at the joint    Cracking or grinding noise with joint movement    Decreased function of the joint    How do you treat osteoarthritis? There is no proven treatment yet that can reverse joint damage from OA. The goal of osteoarthritis treatment is to reduce pain and improve function of the affected joints. Most often, this is possible with a mixture of physical measures and drug therapy and, sometimes, surgery. Physical measures: Weight loss and exercise are useful in OA. Excess weight puts stress on your knee joints and hips and low back. For every 10 pounds of weight you lose over 10 years, you can reduce the chance of developing knee OA by up to 50 percent. Exercise can improve your muscle strength, decrease joint pain and stiffness, and lower the chance of disability due to OA. Also helpful are support (\"assistive\") devices, such as orthotics or a walking cane, that help you do daily activities. Heat or cold therapy can help relieve OA symptoms for a short time. Certain alternative treatments such as spa (hot tub), massage, and chiropractic manipulation can help relieve pain for a short time. They can be costly, though, and require repeated treatments.  Also, the long-term benefits of these alternative (sometimes called complementary or integrative) medicine treatments are unproven but are under study. Drug therapy: Forms of drug therapy include topical, oral (by mouth) and injections (shots). You apply topical drugs directly on the skin over the affected joints. These medicines include capsaicin cream, lidocaine and diclofenac gel. Oral pain relievers such as acetaminophen are common first treatments. So are nonsteroidal anti-inflammatory drugs (often called NSAIDs), which decrease swelling and pain. In 2010, the government (FDA) approved the use of duloxetine (Cymbalta) for chronic (long-term) musculoskeletal pain including from OA. This oral drug is not new. It also is in use for other health concerns, such as mood disorders, nerve pain and fibromyalgia. Patients with more serious pain may need stronger medications, such as prescription narcotics. Joint injections with corticosteroids (sometimes called cortisone shots) or with a form of lubricant called hyaluronic acid can give months of pain relief from OA. This lubricant is given in the knee, and these shots may help delay the need for a knee replacement by a few years in some patients. Surgery: Surgical treatment becomes an option for severe cases. This includes when the joint has serious damage, or when medical treatment fails to relieve pain and you have major loss of function. Surgery may involve arthroscopy, repair of the joint done through small incisions (cuts). If the joint damage cannot be repaired, you may need a joint replacement. Supplements: Many over-the-counter nutrition supplements have been used for osteoarthritis treatment. Most lack good research data to support their effectiveness and safety. Among the most widely used are calcium, vitamin D and omega-3 fatty acids. To ensure safety and avoid drug interactions, consult your doctor or pharmacist before using any of these supplements.  This is especially true when you are combining these supplements with prescribed

## 2023-12-27 LAB
DSDNA IGG SERPL IA-ACNC: 1.3 IU/ML
ENA RNP IGG SER IA-ACNC: 1.7 U/ML
ENA SM IGG SER IA-ACNC: 1 U/ML
ENA SS-A IGG SER IA-ACNC: <0.4 U/ML
ENA SS-B IGG SER IA-ACNC: <0.4 U/ML
U1 SNRNP IGG SER IA-ACNC: 2.4 U/ML

## 2023-12-27 RX ORDER — CLOTRIMAZOLE 10 MG/1
10 LOZENGE ORAL; TOPICAL 4 TIMES DAILY
Qty: 60 TROCHE | Refills: 2 | Status: SHIPPED | OUTPATIENT
Start: 2023-12-27

## 2023-12-27 NOTE — TELEPHONE ENCOUNTER
Requested Prescriptions     Pending Prescriptions Disp Refills    CLOTRIMAZOLE 10 MG Mouth/Throat Michelle [Pharmacy Med Name: CLOTRIMAZOLE 10MG LOZENGE] 60 Michelle 0     Sig: DISSOLVE ONE LOZENGE BY MOUTH FOUR TIMES DAILY     FILLED- 12/12/23  LOV- 8/29/23 Davey and 12/10/11 Luisa Quinn f/u scheduled

## 2023-12-27 NOTE — TELEPHONE ENCOUNTER
Called pt and informed them of the following:  \"X-rays of the cervical thoracic lumbar spine show mild osteoarthritis overall good news\"    Pt verbalized understanding. When in imaging she asked the technician to view images and noted some abnormal curvature to spine. Would like further interpretation if that is caused by the OA and possible treatment.

## 2023-12-28 LAB
KAPPA LC FREE SER-MCNC: 2.09 MG/DL (ref 0.33–1.94)
KAPPA LC FREE/LAMBDA FREE SER NEPH: 1.32 {RATIO} (ref 0.26–1.65)
LAMBDA LC FREE SERPL-MCNC: 1.58 MG/DL (ref 0.57–2.63)

## 2023-12-28 NOTE — TELEPHONE ENCOUNTER
Called pt and informed them of the following per provider in response to prior call: \"The abnormal curvature is likely related to muscle spasm not the arthritis she needs physical therapy and stretching and exercise along with the pain management referral as neck steps   Unfortunately I have nothing to offer other than what the neurologist and PCP are doing I could recommend physical therapy and referral to a pain management specialist that specializes in arthritis of the spine Can give her information to Dr. Roshan Marti Please give her information he has a pain specialist for [facility] She wants physical therapy I can give her an order for that for arthritis and muscle pain. \"    Pt verbalized understanding. Contact information for Ryan Laird M.D. pain medicine [phone] given.  Pt mentions [facility] scheduled for 04/2024

## 2023-12-29 LAB
ALBUMIN SERPL ELPH-MCNC: 4.14 G/DL (ref 3.75–5.21)
ALBUMIN/GLOB SERPL: 1.45 {RATIO} (ref 1–2)
ALPHA1 GLOB SERPL ELPH-MCNC: 0.27 G/DL (ref 0.19–0.46)
ALPHA2 GLOB SERPL ELPH-MCNC: 0.68 G/DL (ref 0.48–1.05)
B-GLOBULIN SERPL ELPH-MCNC: 0.81 G/DL (ref 0.68–1.23)
GAMMA GLOB SERPL ELPH-MCNC: 1.1 G/DL (ref 0.62–1.7)
PROT SERPL-MCNC: 7 G/DL (ref 5.7–8.2)

## 2023-12-30 LAB — VITAMIN B6: 39.9 UG/L

## 2024-01-11 ENCOUNTER — OFFICE VISIT (OUTPATIENT)
Dept: ORTHOPEDICS CLINIC | Facility: CLINIC | Age: 49
End: 2024-01-11
Payer: COMMERCIAL

## 2024-01-11 VITALS — BODY MASS INDEX: 26.2 KG/M2 | WEIGHT: 163 LBS | HEIGHT: 66 IN

## 2024-01-11 DIAGNOSIS — M65.4 TENOSYNOVITIS OF RADIAL STYLOID: Primary | ICD-10-CM

## 2024-01-11 PROCEDURE — 20550 NJX 1 TENDON SHEATH/LIGAMENT: CPT | Performed by: PHYSICIAN ASSISTANT

## 2024-01-11 PROCEDURE — 99203 OFFICE O/P NEW LOW 30 MIN: CPT | Performed by: PHYSICIAN ASSISTANT

## 2024-01-11 PROCEDURE — 3008F BODY MASS INDEX DOCD: CPT | Performed by: PHYSICIAN ASSISTANT

## 2024-01-11 RX ORDER — BETAMETHASONE SODIUM PHOSPHATE AND BETAMETHASONE ACETATE 3; 3 MG/ML; MG/ML
6 INJECTION, SUSPENSION INTRA-ARTICULAR; INTRALESIONAL; INTRAMUSCULAR; SOFT TISSUE ONCE
Status: COMPLETED | OUTPATIENT
Start: 2024-01-11 | End: 2024-01-11

## 2024-01-11 RX ADMIN — BETAMETHASONE SODIUM PHOSPHATE AND BETAMETHASONE ACETATE 6 MG: 3; 3 INJECTION, SUSPENSION INTRA-ARTICULAR; INTRALESIONAL; INTRAMUSCULAR; SOFT TISSUE at 10:36:00

## 2024-01-11 NOTE — H&P
Clinic Note [facility]     Assessment/Plan:  48 year old female    1.  Right right de Quervain's tenosynovitis.  Patient was recommended a corticosteroid injection.  We discussed pros and cons and side effects and they elected to proceed.  They will follow-up in 6 to 8 weeks if symptoms do not resolve     Procedure:  Injection: Written consent was obtained.  Skin was prepped with ChloraPrep.  1 mL of 6 mg of betamethasone and 1 mL of 1% lidocaine was injected into the right first dorsal compartment.  Patient tolerated the procedure well.  No complications were encountered.  Band-Aid was applied.       Follow Up: 6 weeks, can cancel if doing well      ICD-10-CM    1. Tenosynovitis of radial styloid  M65.4 tendon sheath/ligament     betamethasone sodium phosphate & acetate (Celestone) 6 (3-3) MG/ML injection 6 mg          Diagnostic Studies:  No new x-rays taken today.    Physical Exam:    Ht 5' 6\" (1.676 m)   Wt 163 lb (73.9 kg)   LMP 11/15/2023 (Approximate)   BMI 26.31 kg/m²     Constitutional: NAD. AOx3. Well-developed and Well-nourished.   Psychiatric: Normal mood/ affect/ behavior. Judgment and thought content normal.     Right upper Extremity:   Inspection: Skin Intact. No skin lesions. No gross deformity.   Palpation:  Tender to palpation over the first dorsal compartment.  Positive Finkelstein's maneuver   Motion: Elbow: normal bilateral symmetric ext/flex  Wrist: normal bilateral symmetric ext/flex/sup/pro  Finger: full composite fist       CC: Hand Pain (RT HAND DE QUERVAIN'S; TENOSYNOVITIS; CARPAL TUNNEL; ONSET: JUNE 2023)        HPI: This 48 year old female presents with right radial sided wrist pain.  Patient has pain with ulnar deviation of the wrist.  Patient notes tenderness to palpation over the radial side of the wrist. Pain is described as moderate.  Her pain limits the ability to perform her activities of daily living.  No numbness or tingling in the fingers.  Patient has tried bracing  and anti-inflammatories with no relief, It has been going on for 6 months.        Past Medical History:   Diagnosis Date    Anxiety state     Chronic sore throat     Cyst (solitary) of breast     fibrocystic breast with many cysts    GERD (gastroesophageal reflux disease)     maybe silent reflux    History of stomach ulcers     HPV in female     Infertility, female     had in vitro    LPRD (laryngopharyngeal reflux disease)     Phlegm in throat     constant    Prediabetes     Skin cancer     on back BCC    Stomach ulcer 2014    Stress headaches     Visual impairment     contacts     Past Surgical History:   Procedure Laterality Date          UPPER GI ENDOSCOPY - REFERRAL  2013    stomach ulcers     Current Outpatient Medications   Medication Sig Dispense Refill    clotrimazole 10 MG Mouth/Throat Michelle Take 1 lozenge (10 mg total) by mouth 4 (four) times daily. 60 Michelle 2    Meloxicam 15 MG Oral Tab Take 1 tablet (15 mg total) by mouth daily. 30 tablet 2    PREVIDENT 5000 DRY MOUTH 1.1 % Dental Gel BRUSH ON 3 TIMES A WEEK      pregabalin (LYRICA) 100 MG Oral Cap Take 1 capsule (100 mg total) by mouth 3 (three) times daily. 90 capsule 5    valACYclovir 1 G Oral Tab Take 2 tablets (2,000 mg total) by mouth every 12 (twelve) hours. 2 doses per episode 16 tablet 1    amphetamine-dextroamphetamine ER 30 MG Oral Capsule SR 24 Hr Take 1 capsule (30 mg total) by mouth every morning.      amphetamine-dextroamphetamine 30 MG Oral Tab Take 1 tablet (30 mg total) by mouth 2 (two) times daily.      desvenlafaxine  MG Oral Tablet 24 Hr       ALPRAZolam ER 2 MG Oral Tablet 24 Hr Take 1 tablet (2 mg total) by mouth daily.  1    buPROPion HCl ER, XL, 150 MG Oral Tablet 24 Hr Take 1 tablet (150 mg total) by mouth daily.      gabapentin 600 MG Oral Tab Take 1 tablet (600 mg total) by mouth 4 (four) times daily. (Patient not taking: Reported on 2024) 360 tablet 3     No Known Allergies  Family History    Problem Relation Age of Onset    Heart Disease Father         heart attack    Breast Cancer Paternal Grandmother         dx age 40s    Prostate Cancer Maternal Grandfather         dx age 70s    Other (osteoporosis) Maternal Grandmother     No Known Problems Mother     No Known Problems Sister     No Known Problems Brother     No Known Problems Son     Breast Cancer Paternal Cousin Female 46    Diabetes Neg     Heart Disorder Neg     Hypertension Neg     Lipids Neg     Obesity Neg     Psychiatric Neg      Social History     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   Tobacco Use    Smoking status: Never     Passive exposure: Never    Smokeless tobacco: Never   Vaping Use    Vaping Use: Never used   Substance and Sexual Activity    Alcohol use: No    Drug use: No    Sexual activity: Yes     Partners: Male          Review of Systems (negative unless bolded):  General: fevers, chills, fatigue  CV:  chest pain, palpitations, leg swelling  Msk: bodyaches, neck pain, neck stiffness  Skin: rashes, open wounds, nonhealing ulcers  Hem: bleeds easily, bruise easily, immunocompromised  Neuro: dizziness, light headedness, headaches  Psych: anxious, depressed, anger issues      Fernanda Lynch PA-C  Hand, Wrist, & Elbow Surgery  Physician Assistant to Dr. Yunior Schroeder  [facility]  [address] [place] [address], [address], [email or web address]  [email or web address]  t: [phone]  f: [phone]

## 2024-01-24 ENCOUNTER — HOSPITAL ENCOUNTER (OUTPATIENT)
Dept: CT IMAGING | Age: 49
Discharge: HOME | End: 2024-01-24
Attending: FAMILY MEDICINE

## 2024-01-24 VITALS — SYSTOLIC BLOOD PRESSURE: 110 MMHG | DIASTOLIC BLOOD PRESSURE: 56 MMHG

## 2024-01-24 DIAGNOSIS — Z13.6 SCREENING FOR HEART DISEASE: ICD-10-CM

## 2024-01-24 DIAGNOSIS — Z13.9 SCREENING DUE: ICD-10-CM

## 2024-02-01 ENCOUNTER — PATIENT MESSAGE (OUTPATIENT)
Dept: RHEUMATOLOGY | Facility: CLINIC | Age: 49
End: 2024-02-01

## 2024-02-01 DIAGNOSIS — M15.9 PRIMARY OSTEOARTHRITIS INVOLVING MULTIPLE JOINTS: Primary | ICD-10-CM

## 2024-02-01 NOTE — TELEPHONE ENCOUNTER
From: Rita Webb  To: Rayne Winn  Sent: 2/1/2024 4:12 PM CST  Subject: Referral to pain clinic    Hi Dr. Winn - Sorry to bother you, I spoke with one of your nurses that went over my test results and she said for further care I needed to contact Dr. Kong Robins. I called today to make an appt, and they don't see a referral from you and said they need one in order for me to make an appt. Could you send one over and let me know when you've done that and I will call them back. Thank you!

## 2024-02-05 RX ORDER — CLOTRIMAZOLE 10 MG/1
LOZENGE ORAL; TOPICAL
Qty: 60 TROCHE | Refills: 1 | Status: SHIPPED | OUTPATIENT
Start: 2024-02-05

## 2024-02-05 NOTE — TELEPHONE ENCOUNTER
Requested Prescriptions     Pending Prescriptions Disp Refills    CLOTRIMAZOLE 10 MG Mouth/Throat Michelle [Pharmacy Med Name: CLOTRIMAZOLE 10MG LOZENGE] 60 Michelle 2     Sig: TAKE 1 LOZENE BY MOUTH FOUR TIMES DAILY       FILLED-  12/27/23  LOV- 8/29/23    No f/u scheduled

## 2024-02-09 ENCOUNTER — OFFICE VISIT (OUTPATIENT)
Dept: PAIN CLINIC | Facility: CLINIC | Age: 49
End: 2024-02-09
Payer: COMMERCIAL

## 2024-02-09 VITALS — SYSTOLIC BLOOD PRESSURE: 126 MMHG | HEART RATE: 108 BPM | DIASTOLIC BLOOD PRESSURE: 60 MMHG | OXYGEN SATURATION: 98 %

## 2024-02-09 DIAGNOSIS — M47.812 CERVICAL SPONDYLOSIS: Primary | ICD-10-CM

## 2024-02-09 DIAGNOSIS — M47.816 LUMBAR SPONDYLOSIS: ICD-10-CM

## 2024-02-09 NOTE — PATIENT INSTRUCTIONS
Refill policies:    Allow 2-3 business days for refills; controlled substances may take longer.  Contact your pharmacy at least 5 days prior to running out of medication and have them send an electronic request or submit request through the “request refill” option in your truedash account.  Refills are not addressed on weekends; covering physicians do not authorize routine medications on weekends.  No narcotics or controlled substances are refilled after noon on Fridays or by on call physicians.  By law, narcotics must be electronically prescribed.  A 30 day supply with no refills is the maximum allowed.  If your prescription is due for a refill, you may be due for a follow up appointment.  To best provide you care, patients receiving routine medications need to be seen at least once a year.  Patients receiving narcotic/controlled substance medications need to be seen at least once every 3 months.  In the event that your preferred pharmacy does not have the requested medication in stock (e.g. Backordered), it is your responsibility to find another pharmacy that has the requested medication available.  We will gladly send a new prescription to that pharmacy at your request.    Scheduling Tests:    If your physician has ordered radiology tests such as MRI or CT scans, please contact Central Scheduling at [phone] right away to schedule the test.  Once scheduled, the [facility] Centralized Referral Team will work with your insurance carrier to obtain pre-certification or prior authorization.  Depending on your insurance carrier, approval may take 3-10 days.  It is highly recommended patients assure they have received an authorization before having a test performed.  If test is done without insurance authorization, patient may be responsible for the entire amount billed.      Precertification and Prior Authorizations:  If your physician has recommended that you have a procedure or additional testing performed the [facility]  Centralized Referral Team will contact your insurance carrier to obtain pre-certification or prior authorization.    You are strongly encouraged to contact your insurance carrier to verify that your procedure/test has been approved and is a COVERED benefit.  Although the [facility] Centralized Referral Team does its due diligence, the insurance carrier gives the disclaimer that \"Although the procedure is authorized, this does not guarantee payment.\"    Ultimately the patient is responsible for payment.   Thank you for your understanding in this matter.  Paperwork Completion:  If you require FMLA or disability paperwork for your recovery, please make sure to either drop it off or have it faxed to our office at [phone]. Be sure the form has your name and date of birth on it.  The form will be faxed to our Forms Department and they will complete it for you.  There is a 25$ fee for all forms that need to be filled out.  Please be aware there is a 10-14 day turnaround time.  You will need to sign a release of information (SU) form if your paperwork does not come with one.  You may call the Forms Department with any questions at [phone].  Their fax number is [phone].

## 2024-02-09 NOTE — PROGRESS NOTES
Location of Pain: both hands, bilateral lower back pain and base of neck/shoulder      Date Pain Began: 8-10months ago          Work Related:   No        Receiving Work Comp/Disability:   No    Numeric Rating Scale:  Pain at Present:  4                                                                                                            (No Pain) 0  to  10 (Worst Pain)                 Minimum Pain:   3  Maximum Pain  8    Distribution of Pain:    bilateral    Quality of Pain:    throbbing, aching    Origin of Pain:    Lifting, Repetitive motion, and Degenerative    Aggravating Factors:    Other Lifting, moving things    Past Treatments for Current Pain Condition:   Other cortisone shot    Prior diagnostic testing for your pain:  CT

## 2024-02-09 NOTE — PROGRESS NOTES
Patient presents in office today with reported pain in both hands, bilateral lower back pain and base of neck/shoulder     Current pain level reported = 4/10    Last reported dose of Meloxicam 930am

## 2024-02-09 NOTE — PROGRESS NOTES
Patient: Rita Webb  Medical Record Number: [id number]  Site: [facility]  Referring Physician:  Rayne Winn  PCP: Dr. Mohan    Dear Dr. Winn:    Thank you very much for requesting this consultation. I had the opportunity to evaluate and initiate care for your patient today, as per your request.    HISTORY OF CHIEF COMPLAINT:      Rita Webb is a 48 year old female, who complains of bilateral hand pain    Patient is here today with pain in above described described distribution that began >6 months ago.  She states that, initially, was dominantly concerned with R hand, and was sent for XR, which showed some arthritic changes.  Sent to rheumatology, and was cleared from rhem standpoint, and during workup, was sent for XR C, T, L spine.  She was told that there was loss of cervical lordosis, and was sent for further options.  In addition, was evaluated by ortho, and had steroid injection for deQuervain's, which has been 99% effective.  Wishes to discuss spine imaging.  Of note, no significant complaints of spine pain.      VAS Pain Score:  /10    Hand Dominance: right  Loss of dexterity: No  Dropping things: No    Aggravating Factors: Relieving Factors:   Nothing specific  Nothing specific      Past Treatment Attempted/Patient’s Response:    Past Medical History:   Diagnosis Date    Anxiety state     Chronic sore throat     Cyst (solitary) of breast     fibrocystic breast with many cysts    GERD (gastroesophageal reflux disease)     maybe silent reflux    History of stomach ulcers     HPV in female     Infertility, female     had in vitro    LPRD (laryngopharyngeal reflux disease)     Phlegm in throat     constant    Prediabetes     Skin cancer     on back BCC    Stomach ulcer 2014    Stress headaches     Visual impairment     contacts      Past Surgical History:   Procedure Laterality Date      2008    UPPER GI ENDOSCOPY - REFERRAL  2013    stomach ulcers      Family  History   Problem Relation Age of Onset    Heart Disease Father         heart attack    Breast Cancer Paternal Grandmother         dx age 40s    Prostate Cancer Maternal Grandfather         dx age 70s    Other (osteoporosis) Maternal Grandmother     No Known Problems Mother     No Known Problems Sister     No Known Problems Brother     No Known Problems Son     Breast Cancer Paternal Cousin Female 46    Diabetes Neg     Heart Disorder Neg     Hypertension Neg     Lipids Neg     Obesity Neg     Psychiatric Neg       Social History     Socioeconomic History    Marital status:    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   Tobacco Use    Smoking status: Never     Passive exposure: Never    Smokeless tobacco: Never   Vaping Use    Vaping Use: Never used   Substance and Sexual Activity    Alcohol use: No    Drug use: No    Sexual activity: Yes     Partners: Male   Other Topics Concern    Caffeine Concern No    Exercise Yes     Comment: 3x week cardio    Seat Belt Yes    Self-Exams No      Current Medications:  Current Outpatient Medications   Medication Sig Dispense Refill    CLOTRIMAZOLE 10 MG Mouth/Throat Michelle TAKE 1 LOZENE BY MOUTH FOUR TIMES DAILY 60 Michelle 1    Meloxicam 15 MG Oral Tab Take 1 tablet (15 mg total) by mouth daily. 30 tablet 2    PREVIDENT 5000 DRY MOUTH 1.1 % Dental Gel BRUSH ON 3 TIMES A WEEK      pregabalin (LYRICA) 100 MG Oral Cap Take 1 capsule (100 mg total) by mouth 3 (three) times daily. 90 capsule 5    valACYclovir 1 G Oral Tab Take 2 tablets (2,000 mg total) by mouth every 12 (twelve) hours. 2 doses per episode 16 tablet 1    amphetamine-dextroamphetamine ER 30 MG Oral Capsule SR 24 Hr Take 1 capsule (30 mg total) by mouth every morning.      amphetamine-dextroamphetamine 30 MG Oral Tab Take 1 tablet (30 mg total) by mouth 2 (two) times daily.      desvenlafaxine  MG Oral Tablet 24 Hr       ALPRAZolam ER 2 MG Oral Tablet 24 Hr Take 1 tablet (2 mg total) by mouth daily.   1    buPROPion HCl ER, XL, 150 MG Oral Tablet 24 Hr Take 1 tablet (150 mg total) by mouth daily.      gabapentin 600 MG Oral Tab Take 1 tablet (600 mg total) by mouth 4 (four) times daily. (Patient not taking: Reported on 1/11/2024) 360 tablet 3        Functional Assessment: Patient reports that they are able to complete all of their ADL's such as eating, bathing, using the toilet, dressing and getting up from a bed or a chair independently.    REVIEW OF SYSTEMS:   10 point review of systems is otherwise negative,unless otherwise in HPI.      Radiology/Lab Test Reviewed:  XR C and L spine:    CONCLUSION:  No acute fracture or subluxation in the cervical spine.  Degenerative changes as above.     CONCLUSION:  No acute fracture or subluxation in the lumbar spine.  Degenerative changes as above.     CBC:    Lab Results   Component Value Date    WBC 6.0 04/12/2023    WBC 8.3 01/28/2022    WBC 8.7 09/22/2021     Lab Results   Component Value Date    HEMOGLOBIN 11.6 (A) 09/23/2013    HEMOGLOBIN 11.3 02/29/2008    HEMOGLOBIN 9.9 (A) 02/06/2008     Lab Results   Component Value Date    .0 04/12/2023    .0 01/28/2022    .0 09/22/2021       PHYSICAL EXAMIMATION   PHYSICAL EXAMINATION: Rita Webb is a 48 year old female who is observed sitting comfortably on a chair in the exam room alert and oriented times three. She looks consistent with her stated age.    Ht Readings from Last 1 Encounters:   01/11/24 66\"     Wt Readings from Last 1 Encounters:   01/11/24 163 lb (73.9 kg)     The patient is well developed, well nourished, normal body habitus, well muscled. She moves independently from sitting to standing with ease.       Coordination:  Well coordinated; able to engage in rapid alternating movements bilateral upper extremities    Tandem Walk: Able    ROM Cervical Spine:  See chart below:  Motion Right (+ or -) Left (+ or -)   Cervical flexion - -   Cervical extension - -   Cervical lateral bending -  -   Cervical rotation - -     Integument:  Skin over area of cervical spine intact; no erythema, rashes, excoriations, lesions noted    Palpation:  See chart below:  Palpation of Right (+ or -) Left (+ or -)   Cervical Facets - -   Thoracic Facets - -   Paraspinal - -   Trapezius - -   Scapula - -   Occipital - -     Strength:  Strength of bilateral upper extremities grossly intact; 5/5 throughout    Sensation:  Normal sensation noted to light touch and pressure throughout bilateral upper extremities.    Tests:  Test Right (+ or -) Left (+ or -)   Spurling - -   Antoine’s Test     Clonus       HEAD/NECK: Head is normocephalic, neck supple  EYES: EOMI, FILOMENA  LYMPH EXAM: There is no lymph edema in either lower extremity.  VASCULAR EXAM: Radial pulses are normal bilaterally, with good distal perfusion. No clubbing or cyanosis.  HEART: normal, regular, S1 and S2  LUNGS: CTA  MUSCULOSKELETAL: Smooth, pain-free ROM to bilateral shoulders,elbows, wrists and digits.    Do you have any known blood/bleeding disorders?  No  Does patient currently take blood thinners?   None  Does patient currently take any antibiotics?   No    MEDICAL DECISION MAKING:   Impression: Cervical spondylosis, lumbar spondylosis    Patient has degenerative changes of the cervical spine with loss of cervical lordosis is well as scoliosis of the lumbar spine with mild degenerative changes.  That said, she has no significant cervical or lumbar complaints, and these were incidental findings during a rheumatologic workup.  No neurologic deficits to speak of at this time, and while we did discuss options, does not feel as though she would require any treatment.  Asked that she simply follow-up with her orthopedist for ongoing hand pain (did very well with a steroid injection for her right de Quervain's tenosynovitis, though still with generalized \"throbbing\" complaints of the bilateral hands/wrists) we will be happy to see her back should her cervical or  lumbar findings become symptomatic.    Plan: Follow-up as needed should she develop significant cervical or lumbar complaints.    The patient indicates understanding of these issues and agrees to the plan.      Thank you very much.     Respectfully yours,    WALT Sánchez

## 2024-02-24 DIAGNOSIS — G56.01 CARPAL TUNNEL SYNDROME OF RIGHT WRIST: ICD-10-CM

## 2024-02-24 DIAGNOSIS — M79.7 FIBROMYALGIA: ICD-10-CM

## 2024-02-24 DIAGNOSIS — M15.9 PRIMARY OSTEOARTHRITIS INVOLVING MULTIPLE JOINTS: Primary | ICD-10-CM

## 2024-02-26 RX ORDER — MELOXICAM 15 MG/1
15 TABLET ORAL DAILY
Qty: 30 TABLET | Refills: 2 | Status: SHIPPED | OUTPATIENT
Start: 2024-02-26

## 2024-02-26 NOTE — TELEPHONE ENCOUNTER
Future Appointments   Date Time Provider Department Center   2/29/2024  9:55 AM Edith Ng DO [facility]   3/19/2024 11:15 AM Edith Ng DO  [facility]   3/28/2024  7:45 AM Tomy Mohan MD [facility]   5/10/2024 10:30 AM Karma Gallego DO [facility]   6/26/2024 10:20 AM Rayne Winn MD [id number] [address]     Last office visit: 12/26/2023    Last fill: 12/26/2023 30 tab, 2 refills

## 2024-02-29 ENCOUNTER — PATIENT MESSAGE (OUTPATIENT)
Dept: ORTHOPEDICS CLINIC | Facility: CLINIC | Age: 49
End: 2024-02-29

## 2024-02-29 ENCOUNTER — OFFICE VISIT (OUTPATIENT)
Dept: NEUROLOGY | Facility: CLINIC | Age: 49
End: 2024-02-29
Payer: COMMERCIAL

## 2024-02-29 VITALS
WEIGHT: 155 LBS | SYSTOLIC BLOOD PRESSURE: 118 MMHG | DIASTOLIC BLOOD PRESSURE: 70 MMHG | BODY MASS INDEX: 25 KG/M2 | HEART RATE: 72 BPM | RESPIRATION RATE: 16 BRPM

## 2024-02-29 DIAGNOSIS — F32.0 CURRENT MILD EPISODE OF MAJOR DEPRESSIVE DISORDER, UNSPECIFIED WHETHER RECURRENT (HCC): ICD-10-CM

## 2024-02-29 DIAGNOSIS — K14.6 BURNING MOUTH SYNDROME: Primary | ICD-10-CM

## 2024-02-29 DIAGNOSIS — F41.9 ANXIETY: ICD-10-CM

## 2024-02-29 PROCEDURE — 99204 OFFICE O/P NEW MOD 45 MIN: CPT | Performed by: OTHER

## 2024-02-29 RX ORDER — PREGABALIN 50 MG/1
CAPSULE ORAL
Qty: 90 CAPSULE | Refills: 1 | Status: SHIPPED | OUTPATIENT
Start: 2024-02-29

## 2024-02-29 RX ORDER — PREGABALIN 100 MG/1
CAPSULE ORAL
Qty: 90 CAPSULE | Refills: 1 | Status: SHIPPED | OUTPATIENT
Start: 2024-02-29

## 2024-02-29 NOTE — PROGRESS NOTES
Patient states burning sensation on the tongue, which started about a year ago. Patient states benefit with lyrica and clotrimazole. Patient states no changes in speech or chewing.

## 2024-02-29 NOTE — PATIENT INSTRUCTIONS
Refill policies:    Allow 2-3 business days for refills; controlled substances may take longer.  Contact your pharmacy at least 5 days prior to running out of medication and have them send an electronic request or submit request through the “request refill” option in your RPost account.  Refills are not addressed on weekends; covering physicians do not authorize routine medications on weekends.  No narcotics or controlled substances are refilled after noon on Fridays or by on call physicians.  By law, narcotics must be electronically prescribed.  A 30 day supply with no refills is the maximum allowed.  If your prescription is due for a refill, you may be due for a follow up appointment.  To best provide you care, patients receiving routine medications need to be seen at least once a year.  Patients receiving narcotic/controlled substance medications need to be seen at least once every 3 months.  In the event that your preferred pharmacy does not have the requested medication in stock (e.g. Backordered), it is your responsibility to find another pharmacy that has the requested medication available.  We will gladly send a new prescription to that pharmacy at your request.    Scheduling Tests:    If your physician has ordered radiology tests such as MRI or CT scans, please contact Central Scheduling at [phone] right away to schedule the test.  Once scheduled, the [facility] Centralized Referral Team will work with your insurance carrier to obtain pre-certification or prior authorization.  Depending on your insurance carrier, approval may take 3-10 days.  It is highly recommended patients assure they have received an authorization before having a test performed.  If test is done without insurance authorization, patient may be responsible for the entire amount billed.      Precertification and Prior Authorizations:  If your physician has recommended that you have a procedure or additional testing performed the [facility]  Centralized Referral Team will contact your insurance carrier to obtain pre-certification or prior authorization.    You are strongly encouraged to contact your insurance carrier to verify that your procedure/test has been approved and is a COVERED benefit.  Although the [facility] Centralized Referral Team does its due diligence, the insurance carrier gives the disclaimer that \"Although the procedure is authorized, this does not guarantee payment.\"    Ultimately the patient is responsible for payment.   Thank you for your understanding in this matter.  Paperwork Completion:  If you require FMLA or disability paperwork for your recovery, please make sure to either drop it off or have it faxed to our office at [phone]. Be sure the form has your name and date of birth on it.  The form will be faxed to our Forms Department and they will complete it for you.  There is a 25$ fee for all forms that need to be filled out.  Please be aware there is a 10-14 day turnaround time.  You will need to sign a release of information (SU) form if your paperwork does not come with one.  You may call the Forms Department with any questions at [phone].  Their fax number is [phone].

## 2024-02-29 NOTE — TELEPHONE ENCOUNTER
From: Rita Webb  To: Fernanda Chen  Sent: 2/29/2024 9:57 AM CST  Subject: Rita Webb Follow-Up    Hi Ms. Chen - I wanted you send you a follow-up since my cortisone shot. My thumb has definitely made improvement, but I still can’t put much pressure on it. That may possibly be normal, I wasn’t sure. Do you think I would benefit to make another appointment or should I give the shot a little more time?   Thanks so much.   Rita

## 2024-02-29 NOTE — PROGRESS NOTES
[facility] - [facility]   Neurology    Rita Webb Patient Status:  No patient class for patient encounter    1975 MRN [id number]   Location [facility] PCP Tomy Mohan MD              HPI:   Rita Webb is a(n) 48 year old female who presents at the request of Tomy Mohan MD for evaluation of burning in the tongue. It started about a year ago, entire mid to anterior tongue, as well as sides of the tongue. It is a burning sensation, without numbness. Denies symptoms in the arms or legs. She has rheumatologic work up, which came back negative, including Sjogrens. Also saw ENT. She was started on gabapentin, which did not help. Was given clotrimazole lozenges, which seemed to numb the tongue. She started Lyrica 100mg TID, which helps her symptoms.       Pertinent imaging and laboratory work-up:   Component      Latest Ref Rng 2023   PROTEIN, TOTAL      5.7 - 8.2 g/dL  7.0    Albumin      3.75 - 5.21 g/dL  4.14    ALPHA-1-GLOBULINS      0.19 - 0.46 g/dL  0.27    ALPHA-2-GLOBULINS      0.48 - 1.05 g/dL  0.68    BETA GLOBULINS      0.68 - 1.23 g/dL  0.81    GAMMA GLOBULINS      0.62 - 1.70 g/dL  1.10    ALBUMIN/GLOBULIN RATIO      1.00 - 2.00   1.45    SPE INTERPRETATION  No apparent monoclonal protein on serum electrophoresis.…    KAPPA FREE LIGHT CHAIN      0.330 - 1.940 mg/dL  2.089 (H)    LAMBDA FREE LIGHT CHAIN      0.571 - 2.630 mg/dL  1.578    KAPPA/LAMBDA FLC RATIO      0.26 - 1.65   1.32    HEMOGLOBIN A1c      <5.7 % 5.5     ESTIMATED AVERAGE GLUCOSE      68 - 126 mg/dL 111     TSH      0.358 - 3.740 mIU/mL 1.730     Vitamin B12      193 - 986 pg/mL 699  332    FOLATE (FOLIC ACID), SERUM      >=8.7 ng/mL 15.1     VITAMIN B6      3.4 - 65.2 ug/L  39.9    Anti-SSA Antibody, IGG      <7 U/mL  <0.4    Anti-SSB Antibody, IGG      <7 U/mL  <0.4         Past Medical History:  Past Medical History:   Diagnosis Date    Anxiety  state     Chronic sore throat     Cyst (solitary) of breast     fibrocystic breast with many cysts    GERD (gastroesophageal reflux disease)     maybe silent reflux    History of stomach ulcers     HPV in female     Infertility, female     had in vitro    LPRD (laryngopharyngeal reflux disease)     Phlegm in throat     constant    Prediabetes     Skin cancer     on back BCC    Stomach ulcer 2014    Stress headaches     Visual impairment     contacts        Past Surgical History:  Past Surgical History:   Procedure Laterality Date          UPPER GI ENDOSCOPY - REFERRAL      stomach ulcers       Family History:  family history includes Breast Cancer in her paternal grandmother; Breast Cancer (age of onset: 46) in her paternal cousin female; Heart Disease in her father; No Known Problems in her brother, mother, sister, and son; Prostate Cancer in her maternal grandfather; osteoporosis in her maternal grandmother.      Social History:   reports that she has never smoked. She has never been exposed to tobacco smoke. She has never used smokeless tobacco. She reports that she does not drink alcohol and does not use drugs.    Allergies:  No Known Allergies    MEDICATIONS:    Current Outpatient Medications:     pregabalin (LYRICA) 50 MG Oral Cap, Week 1: together with 50mg capsule take 150mg in am, 100mg midday, and 100mg nightly, Week 2: if burning not better, increase to 150mg in am, 150mg midday, and 100mg nightly, Week 3: either continue this dose, or if burning not improved, increase to 150mg three times daily, Disp: 90 capsule, Rfl: 1    pregabalin (LYRICA) 100 MG Oral Cap, Week 1: together with 50mg capsule take 150mg in am, 100mg midday, and 100mg nightly, Week 2: if burning not better, increase to 150mg in am, 150mg midday, and 100mg nightly, Week 3: either continue this dose, or if burning not improved, increase to 150mg three times daily, Disp: 90 capsule, Rfl: 1    Meloxicam 15 MG Oral Tab,  Take 1 tablet (15 mg total) by mouth daily., Disp: 30 tablet, Rfl: 2    CLOTRIMAZOLE 10 MG Mouth/Throat Michelle, TAKE 1 LOZENE BY MOUTH FOUR TIMES DAILY, Disp: 60 Michelle, Rfl: 1    PREVIDENT 5000 DRY MOUTH 1.1 % Dental Gel, BRUSH ON 3 TIMES A WEEK, Disp: , Rfl:     pregabalin (LYRICA) 100 MG Oral Cap, Take 1 capsule (100 mg total) by mouth 3 (three) times daily., Disp: 90 capsule, Rfl: 5    valACYclovir 1 G Oral Tab, Take 2 tablets (2,000 mg total) by mouth every 12 (twelve) hours. 2 doses per episode, Disp: 16 tablet, Rfl: 1    amphetamine-dextroamphetamine ER 30 MG Oral Capsule SR 24 Hr, Take 1 capsule (30 mg total) by mouth every morning., Disp: , Rfl:     amphetamine-dextroamphetamine 30 MG Oral Tab, Take 1 tablet (30 mg total) by mouth 2 (two) times daily., Disp: , Rfl:     desvenlafaxine  MG Oral Tablet 24 Hr, , Disp: , Rfl:     ALPRAZolam ER 2 MG Oral Tablet 24 Hr, Take 1 tablet (2 mg total) by mouth daily., Disp: , Rfl: 1    buPROPion HCl ER, XL, 150 MG Oral Tablet 24 Hr, Take 1 tablet (150 mg total) by mouth daily., Disp: , Rfl:       Review of Systems:   A comprehensive 10 point review of systems was completed.     Pertinent positives and negatives noted in the HPI.         PHYSICAL EXAM:   Neurologic Exam  Vitals  Vitals:    02/29/24 1003   BP: 118/70   Pulse: 72   Resp: 16     General Appearance: Patient is a 48 year old female in no acute distress  Cardiac: Normal rate & regular rhythm  Skin: There are no rashes or other skin lesions.  Musculoskeletal: There is no scoliosis, or joint deformities  Neurologic examination:  Mental status: Patient is alert, attentive, and oriented x 3. Language is coherent and fluent without aphasia. Memory, comprehension and ability to follow commands were intact.   Cranial nerves II-XII: Optic discs were sharp. Pupils were round and reacted to light. Extraocular movements were full. There was no face, jaw, palate or tongue weakness or atrophy. Facial sensation was  normal. Hearing was grossly intact. Shoulder shrug was normal.   Motor exam revealed normal muscle bulk and tone. No atrophy or fasciculations. Manual muscle testing revealed MRC grade 5/5 strength throughout including proximal and distal muscles of the arms and legs.  Deep tendon reflexes were 2 at the biceps, brachioradialis, triceps, knee jerk, and ankle jerk. Plantar responses were flexor bilaterally.   Sensory exam revealed normal light touch perception. Vibratory perception and proprioception were intact at the toes. Pinprick and temperature were normal. Romberg sign was absent.  Complex motor skills revealed normal coordination. Finger-nose-finger intact.   Gait was narrow and stable, was able to walk on heels, toes and tandem without any difficulty.     ASSESSMENT/ACTIVE PROBLEM LIST:     Encounter Diagnoses   Name Primary?    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 No signs of other general neuropathic process  Rheum/sjogrens work up negative  Check zinc, thiamine, repeat B12   Increase Lyrica to 150/100/100mg, then 150mg TID if needed  Other option would be to add nortriptyline  Avoid using tobacco, limit acidic or spicy foods, avoid drinking carbonated beverages. Stress can exacerbate symptoms.  Discussed that this is often a chronic condition, rarely does go away    Requested Prescriptions     Signed Prescriptions Disp Refills    pregabalin (LYRICA) 50 MG Oral Cap 90 capsule 1     Sig: Week 1: together with 50mg capsule take 150mg in am, 100mg midday, and 100mg nightly, Week 2: if burning not better, increase to 150mg in am, 150mg midday, and 100mg nightly, Week 3: either continue this dose, or if burning not improved, increase to 150mg three times daily    pregabalin (LYRICA) 100 MG Oral Cap  90 capsule 1     Sig: Week 1: together with 50mg capsule take 150mg in am, 100mg midday, and 100mg nightly, Week 2: if burning not better, increase to 150mg in am, 150mg midday, and 100mg nightly, Week 3: either continue this dose, or if burning not improved, increase to 150mg three times daily          We discussed in depth regarding the diagnosis, prognosis, treatment. The patient was given ample opportunity to ask questions. All questions and concerns were addressed.     Debra Ng DO  Neuromuscular and General Neurology  [facility]

## 2024-03-04 ENCOUNTER — LAB ENCOUNTER (OUTPATIENT)
Dept: LAB | Age: 49
End: 2024-03-04
Attending: Other
Payer: COMMERCIAL

## 2024-03-04 DIAGNOSIS — K14.6 BURNING MOUTH SYNDROME: ICD-10-CM

## 2024-03-04 LAB
IRON SERPL-MCNC: 72 UG/DL
VIT B12 SERPL-MCNC: 411 PG/ML (ref 193–986)

## 2024-03-04 PROCEDURE — 83540 ASSAY OF IRON: CPT

## 2024-03-04 PROCEDURE — 82607 VITAMIN B-12: CPT

## 2024-03-04 PROCEDURE — 36415 COLL VENOUS BLD VENIPUNCTURE: CPT

## 2024-03-04 PROCEDURE — 84630 ASSAY OF ZINC: CPT

## 2024-03-04 PROCEDURE — 84425 ASSAY OF VITAMIN B-1: CPT

## 2024-03-07 LAB
VITAMIN B1 WHOLE BLD: 133.9 NMOL/L
ZINC: 57 UG/DL

## 2024-03-14 RX ORDER — CLOTRIMAZOLE 10 MG/1
10 LOZENGE ORAL; TOPICAL 4 TIMES DAILY
Qty: 60 TROCHE | Refills: 0 | Status: SHIPPED | OUTPATIENT
Start: 2024-03-14

## 2024-03-14 NOTE — TELEPHONE ENCOUNTER
Requested Prescriptions     Pending Prescriptions Disp Refills    CLOTRIMAZOLE 10 MG Mouth/Throat Michelle [Pharmacy Med Name: CLOTRIMAZOLE 10MG LOZENGE] 60 Michelle 1     Sig: USE ONE LOZENGE BY MOUTH FOUR TIMES DAILY       FILLED-  2/5/24  LOV- 8/29/23    No f/u scheduled

## 2024-03-18 ENCOUNTER — HOSPITAL ENCOUNTER (OUTPATIENT)
Age: 49
Discharge: HOME | End: 2024-03-18
Payer: COMMERCIAL

## 2024-03-18 VITALS
OXYGEN SATURATION: 100 % | HEIGHT: 66 IN | WEIGHT: 150 LBS | RESPIRATION RATE: 18 BRPM | TEMPERATURE: 98 F | HEART RATE: 99 BPM | SYSTOLIC BLOOD PRESSURE: 112 MMHG | BODY MASS INDEX: 24.11 KG/M2 | DIASTOLIC BLOOD PRESSURE: 72 MMHG

## 2024-03-18 DIAGNOSIS — J06.9 VIRAL UPPER RESPIRATORY TRACT INFECTION WITH COUGH: Primary | ICD-10-CM

## 2024-03-18 LAB — S PYO AG THROAT QL IA.RAPID: NEGATIVE

## 2024-03-18 PROCEDURE — 99213 OFFICE O/P EST LOW 20 MIN: CPT

## 2024-03-18 PROCEDURE — 87651 STREP A DNA AMP PROBE: CPT | Performed by: PHYSICIAN ASSISTANT

## 2024-03-18 PROCEDURE — 99212 OFFICE O/P EST SF 10 MIN: CPT

## 2024-03-18 NOTE — ED INITIAL ASSESSMENT (HPI)
Patient reports she woke up yesterday with a cough.  Patient reports her son just got over strep.

## 2024-03-18 NOTE — ED PROVIDER NOTES
Patient Seen in: [facility]      History     Chief Complaint   Patient presents with    Cough     Sore throat, fatigue - Entered by patient     Stated Complaint: Cough - Sore throat, fatigue    Subjective:   HPI    48-year-old female who comes in today with known history of GERD, LPRD and stomach ulcers complaining of sore throat, nasal congestion and cough.  Patient is concerned because on recently had strep pharyngitis.  Denies fever or chills.  Denies difficulty breathing or swallowing.    Objective:   Past Medical History:   Diagnosis Date    Anxiety state     Chronic sore throat     Cyst (solitary) of breast     fibrocystic breast with many cysts    GERD (gastroesophageal reflux disease)     maybe silent reflux    History of stomach ulcers     HPV in female     Infertility, female     had in vitro    LPRD (laryngopharyngeal reflux disease)     Phlegm in throat     constant    Prediabetes     Skin cancer     on back BCC    Stomach ulcer 2014    Stress headaches     Visual impairment     contacts              Past Surgical History:   Procedure Laterality Date          UPPER GI ENDOSCOPY - REFERRAL      stomach ulcers                Social History     Socioeconomic History    Marital status:    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  Constitutional and vital signs reviewed.      All other systems reviewed and negative except as noted  above.    Physical Exam     ED Triage Vitals [03/18/24 0943]   /72   Pulse 99   Resp 18   Temp 97.7 °F (36.5 °C)   Temp src Temporal   SpO2 100 %   O2 Device None (Room air)       Current:/72   Pulse 99   Temp 97.7 °F (36.5 °C) (Temporal)   Resp 18   Ht 167.6 cm (5' 6\")   Wt 68 kg   LMP 02/26/2024 (Approximate)   SpO2 100%   BMI 24.21 kg/m²         Physical Exam    General Appearance: Alert, cooperative, no distress, appropriate for age   Head: Normocephalic, without obvious abnormality   Eyes: PERRL,  conjunctiva and cornea clear, both eyes   Ears: TM pearly gray color and semitransparent, external ear canals normal, both ears   Nose: Nares symmetrical, septum midline, mucosa normal, clear watery discharge; no sinus tenderness   Throat: Lips, tongue, and mucosa are moist, pink, and intact; teeth intact. No erythema, no exudates or tonsillar hypertrophy, uvula midline, no trismus or drooling no phonation changes, patient handling secretions well   Neck: Supple; no anterior or posterior cervical adenopathy, no neck rigidity or meningeal signs  Lungs: Clear to auscultation bilaterally, respirations unlabored. No wheezing, rales or rhonchi.   Heart: NSR, S1, S2 present. No murmurs, rubs or gallops.  Skin: no rash       ED Course     Labs Reviewed   RAPID STREP A - Normal            MDM             Medical Decision Making  48-year-old female with upper respiratory symptoms known strep exposure from son    Problems Addressed:  Viral upper respiratory tract infection with cough: acute illness or injury    Amount and/or Complexity of Data Reviewed  Labs: ordered. Decision-making details documented in ED Course.     Details: Strep negative    Risk  OTC drugs.  Risk Details: Clinical Impression: Viral Pharyngitis, viral upper respiratory infection      The differential diagnosis before testing included viral pharyngitis, strep pharyngitis, PTA, which is a medical condition that poses a threat to  life/function.       Alternate Tylenol and ibuprofen, warm salt water gargles         Disposition and Plan     Clinical Impression:  1. Viral upper respiratory tract infection with cough         Disposition:  Discharge  3/18/2024 10:44 am    Follow-up:  Tomy Mohan MD  [address] [place] [id number]  [phone]    Schedule an appointment as soon as possible for a visit   If symptoms worsen          Medications Prescribed:  Discharge Medication List as of 3/18/2024 10:48 AM        This report has been produced using speech recognition software and may contain errors related to that system including, but not limited to, errors in grammar, punctuation, and spelling, as well as words and phrases that possibly may have been recognized inappropriately.  If there are any questions or concerns, contact the dictating provider for clarification.     NOTE: The 21st Century Cares Act makes medical notes available to patients.  Be advised that this is a medical document written in medical language and may contain abbreviations or verbiage that is unfamiliar or direct.  It is primarily intended to carry relevant historical information, physical exam findings, and the clinical assessment of the physician.

## 2024-03-19 ENCOUNTER — OFFICE VISIT (OUTPATIENT)
Dept: ELECTROPHYSIOLOGY | Facility: HOSPITAL | Age: 49
End: 2024-03-19
Attending: INTERNAL MEDICINE
Payer: COMMERCIAL

## 2024-03-19 DIAGNOSIS — M79.7 FIBROMYALGIA: ICD-10-CM

## 2024-03-19 DIAGNOSIS — K14.6 BURNING MOUTH SYNDROME: Primary | ICD-10-CM

## 2024-03-19 DIAGNOSIS — M15.9 PRIMARY OSTEOARTHRITIS INVOLVING MULTIPLE JOINTS: ICD-10-CM

## 2024-03-19 PROCEDURE — 95911 NRV CNDJ TEST 9-10 STUDIES: CPT | Performed by: OTHER

## 2024-03-19 PROCEDURE — 95886 MUSC TEST DONE W/N TEST COMP: CPT | Performed by: OTHER

## 2024-03-19 RX ORDER — PREGABALIN 150 MG/1
150 CAPSULE ORAL 3 TIMES DAILY
Qty: 90 CAPSULE | Refills: 1 | Status: SHIPPED | OUTPATIENT
Start: 2024-03-19

## 2024-03-19 NOTE — PROCEDURES
[facility]  Nerve Conduction & EMG Report                      Debra Ng D.O.  [facility]   EMG department   [address] [place] [id number]  [phone]          Patient name: Rita Webb  YOB: 1975  Referring provider: Dr. Winn  Reason for study: Eval for carpal tunnel  Brief history: 48 year old female with several month history of pain in the fingers at night      Sensory and motor nerve conduction studies, and needle EMG:  Please see separate sheet for waveforms and quantitative nerve conduction data, as well as for tabulated form of electromyographic data.      Summary:   1.  The bilateral median sensory responses were normal.  2.  The bilateral ulnar sensory responses, as well as the right radial sensory response, were normal.  3.  The bilateral median motor responses were normal.  4.  The bilateral ulnar motor responses were normal as well.  5.  Needle EMG using a concentric needle was performed on selected muscles of the right upper extremity.  All muscles tested were normal.    Conclusion:  1.  There is no electrophysiologic evidence of a median mononeuropathy, or of any other mononeuropathies, on this study.  2.  There is no electrographic evidence of a cervical radiculopathy.      Debra Ng DO  Neurology and Neuromuscular medicine  [facility]

## 2024-03-27 ENCOUNTER — LAB ENCOUNTER (OUTPATIENT)
Dept: LAB | Age: 49
End: 2024-03-27
Attending: FAMILY MEDICINE
Payer: COMMERCIAL

## 2024-03-27 DIAGNOSIS — Z13.1 SCREENING FOR DIABETES MELLITUS: ICD-10-CM

## 2024-03-27 DIAGNOSIS — Z13.0 SCREENING FOR IRON DEFICIENCY ANEMIA: ICD-10-CM

## 2024-03-27 DIAGNOSIS — Z79.899 LONG-TERM USE OF HIGH-RISK MEDICATION: ICD-10-CM

## 2024-03-27 DIAGNOSIS — Z13.6 SCREENING FOR CARDIOVASCULAR CONDITION: ICD-10-CM

## 2024-03-27 DIAGNOSIS — R73.9 HYPERGLYCEMIA: ICD-10-CM

## 2024-03-27 DIAGNOSIS — Z00.00 LABORATORY EXAMINATION ORDERED AS PART OF A ROUTINE GENERAL MEDICAL EXAMINATION: ICD-10-CM

## 2024-03-27 DIAGNOSIS — Z13.29 SCREENING FOR THYROID DISORDER: ICD-10-CM

## 2024-03-27 LAB
ALBUMIN SERPL-MCNC: 3.6 G/DL (ref 3.4–5)
ALBUMIN/GLOB SERPL: 1.1 {RATIO} (ref 1–2)
ALP LIVER SERPL-CCNC: 50 U/L
ALT SERPL-CCNC: 18 U/L
ANION GAP SERPL CALC-SCNC: 6 MMOL/L (ref 0–18)
AST SERPL-CCNC: 6 U/L (ref 15–37)
BASOPHILS # BLD AUTO: 0.04 X10(3) UL (ref 0–0.2)
BASOPHILS NFR BLD AUTO: 0.6 %
BILIRUB SERPL-MCNC: 0.3 MG/DL (ref 0.1–2)
BUN BLD-MCNC: 13 MG/DL (ref 9–23)
CALCIUM BLD-MCNC: 9.4 MG/DL (ref 8.5–10.1)
CHLORIDE SERPL-SCNC: 110 MMOL/L (ref 98–112)
CHOLEST SERPL-MCNC: 164 MG/DL (ref ?–200)
CO2 SERPL-SCNC: 27 MMOL/L (ref 21–32)
CREAT BLD-MCNC: 0.8 MG/DL
EGFRCR SERPLBLD CKD-EPI 2021: 90 ML/MIN/1.73M2 (ref 60–?)
EOSINOPHIL # BLD AUTO: 0.11 X10(3) UL (ref 0–0.7)
EOSINOPHIL NFR BLD AUTO: 1.7 %
ERYTHROCYTE [DISTWIDTH] IN BLOOD BY AUTOMATED COUNT: 11.6 %
EST. AVERAGE GLUCOSE BLD GHB EST-MCNC: 111 MG/DL (ref 68–126)
FASTING PATIENT LIPID ANSWER: YES
FASTING STATUS PATIENT QL REPORTED: YES
GLOBULIN PLAS-MCNC: 3.4 G/DL (ref 2.8–4.4)
GLUCOSE BLD-MCNC: 90 MG/DL (ref 70–99)
HBA1C MFR BLD: 5.5 % (ref ?–5.7)
HCT VFR BLD AUTO: 34.3 %
HDLC SERPL-MCNC: 41 MG/DL (ref 40–59)
HGB BLD-MCNC: 11.6 G/DL
IMM GRANULOCYTES # BLD AUTO: 0.02 X10(3) UL (ref 0–1)
IMM GRANULOCYTES NFR BLD: 0.3 %
LDLC SERPL CALC-MCNC: 109 MG/DL (ref ?–100)
LYMPHOCYTES # BLD AUTO: 2.53 X10(3) UL (ref 1–4)
LYMPHOCYTES NFR BLD AUTO: 39.2 %
MCH RBC QN AUTO: 31.4 PG (ref 26–34)
MCHC RBC AUTO-ENTMCNC: 33.8 G/DL (ref 31–37)
MCV RBC AUTO: 92.7 FL
MONOCYTES # BLD AUTO: 0.47 X10(3) UL (ref 0.1–1)
MONOCYTES NFR BLD AUTO: 7.3 %
NEUTROPHILS # BLD AUTO: 3.29 X10 (3) UL (ref 1.5–7.7)
NEUTROPHILS # BLD AUTO: 3.29 X10(3) UL (ref 1.5–7.7)
NEUTROPHILS NFR BLD AUTO: 50.9 %
NONHDLC SERPL-MCNC: 123 MG/DL (ref ?–130)
OSMOLALITY SERPL CALC.SUM OF ELEC: 296 MOSM/KG (ref 275–295)
PLATELET # BLD AUTO: 408 10(3)UL (ref 150–450)
POTASSIUM SERPL-SCNC: 4 MMOL/L (ref 3.5–5.1)
PROT SERPL-MCNC: 7 G/DL (ref 6.4–8.2)
RBC # BLD AUTO: 3.7 X10(6)UL
SODIUM SERPL-SCNC: 143 MMOL/L (ref 136–145)
TRIGL SERPL-MCNC: 71 MG/DL (ref 30–149)
TSI SER-ACNC: 0.88 MIU/ML (ref 0.36–3.74)
VLDLC SERPL CALC-MCNC: 12 MG/DL (ref 0–30)
WBC # BLD AUTO: 6.5 X10(3) UL (ref 4–11)

## 2024-03-27 PROCEDURE — 80053 COMPREHEN METABOLIC PANEL: CPT

## 2024-03-27 PROCEDURE — 84443 ASSAY THYROID STIM HORMONE: CPT

## 2024-03-27 PROCEDURE — 36415 COLL VENOUS BLD VENIPUNCTURE: CPT

## 2024-03-27 PROCEDURE — 83036 HEMOGLOBIN GLYCOSYLATED A1C: CPT

## 2024-03-27 PROCEDURE — 80061 LIPID PANEL: CPT

## 2024-03-27 PROCEDURE — 85025 COMPLETE CBC W/AUTO DIFF WBC: CPT

## 2024-04-01 RX ORDER — CLOTRIMAZOLE 10 MG/1
LOZENGE ORAL; TOPICAL
Qty: 60 TROCHE | Refills: 0 | Status: SHIPPED | OUTPATIENT
Start: 2024-04-01

## 2024-04-01 NOTE — TELEPHONE ENCOUNTER
Future Appointments   Date Time Provider Department Center   5/1/2024 11:00 AM Lavon Mariscal MD [id number]

## 2024-04-01 NOTE — TELEPHONE ENCOUNTER
Requested Prescriptions     Pending Prescriptions Disp Refills    CLOTRIMAZOLE 10 MG Mouth/Throat Michelle [Pharmacy Med Name: CLOTRIMAZOLE 10MG LOZENGE] 60 Michelle 0     Sig: TAKE 1 LOZENGE BY MOUTH FOUR TIMES DAILY       FILLED- 3/14/24  LOV- 8/29/23    No f/u scheduled

## 2024-04-02 NOTE — TELEPHONE ENCOUNTER
Patient is asking for her valACYclovir 1 G Oral Tab she has a mouth outbreak that started this morning she had one pill which she took now she is completely out.

## 2024-04-03 RX ORDER — VALACYCLOVIR HYDROCHLORIDE 1 G/1
2000 TABLET, FILM COATED ORAL EVERY 12 HOURS SCHEDULED
Qty: 16 TABLET | Refills: 1 | Status: SHIPPED | OUTPATIENT
Start: 2024-04-03

## 2024-04-03 NOTE — TELEPHONE ENCOUNTER
Requested Prescriptions     Pending Prescriptions Disp Refills    valACYclovir 1 G Oral Tab 16 tablet 1     Sig: Take 2 tablets (2,000 mg total) by mouth every 12 (twelve) hours. 2 doses per episode     LOV 12/6/2023     Patient was asked to follow-up in: Follow-up not documented in note    Appointment scheduled: 4/22/2024 Tomy Mohan MD     Medication refilled per protocol.    Patient comment: Hi Dr. Mohan-I woke up with a cold sore and i went to get my medicine and realized i was out. If you could refill i would appreciate. Thank you!         Routed to Marques Huitron MD, for review.

## 2024-04-09 RX ORDER — VALACYCLOVIR HYDROCHLORIDE 1 G/1
2000 TABLET, FILM COATED ORAL EVERY 12 HOURS
Qty: 16 TABLET | Refills: 1 | OUTPATIENT
Start: 2024-04-09

## 2024-04-16 RX ORDER — CLOTRIMAZOLE 10 MG/1
10 LOZENGE ORAL; TOPICAL 3 TIMES DAILY
Qty: 60 TROCHE | Refills: 0 | Status: SHIPPED | OUTPATIENT
Start: 2024-04-16

## 2024-04-17 ENCOUNTER — PATIENT MESSAGE (OUTPATIENT)
Dept: NEUROLOGY | Facility: CLINIC | Age: 49
End: 2024-04-17

## 2024-04-17 DIAGNOSIS — K14.6 BURNING MOUTH SYNDROME: Primary | ICD-10-CM

## 2024-04-17 NOTE — TELEPHONE ENCOUNTER
From: Rita Webb  To: Edith Ng  Sent: 4/17/2024 3:06 PM CDT  Subject: Medication question    Hi, Dr. Ng - I have been taking Pregabalin, 150mg 3x daily for my burning tongue. It is definitely is helping, but I am still having some of the burning sensation. Is there an option to up the dosage? If you could let me know my options I would appreciate it. Thanks so much!  Rita

## 2024-04-19 RX ORDER — PREGABALIN 50 MG/1
CAPSULE ORAL
Qty: 60 CAPSULE | Refills: 1 | Status: SHIPPED | OUTPATIENT
Start: 2024-04-19

## 2024-05-01 ENCOUNTER — OFFICE VISIT (OUTPATIENT)
Facility: LOCATION | Age: 49
End: 2024-05-01
Payer: COMMERCIAL

## 2024-05-01 DIAGNOSIS — K14.0 GLOSSITIS: Primary | ICD-10-CM

## 2024-05-01 PROCEDURE — 99213 OFFICE O/P EST LOW 20 MIN: CPT | Performed by: OTOLARYNGOLOGY

## 2024-05-01 RX ORDER — CLOTRIMAZOLE 10 MG/1
10 LOZENGE ORAL; TOPICAL 4 TIMES DAILY
Qty: 60 LOZENGE | Refills: 1 | Status: SHIPPED | OUTPATIENT
Start: 2024-05-01

## 2024-05-01 NOTE — PROGRESS NOTES
Rita Webb is a 48 year old female.   Chief Complaint   Patient presents with    Follow - Up     Medication f/u     HPI:   She has chronic burning mouth syndrome.  She was on gabapentin.  She is now on Lyrica through neurology and that seems to be helping.  She also feels like the clotrimazole lozenges help as a numb the tongue.    REVIEW OF SYSTEMS:   GENERAL HEALTH: feels well otherwise  GENERAL : denies fever, chills, sweats, weight loss, weight gain  SKIN: denies any unusual skin lesions or rashes  RESPIRATORY: denies shortness of breath with exertion  NEURO: denies headaches    EXAM:   LMP 02/26/2024 (Approximate)     System Findings Details   Constitutional  Overall appearance - Normal.   Psychiatric  Orientation - Oriented to time, place, person & situation. Appropriate mood and affect.   Head/Face  Facial features -- Normal. Skull - Normal.   Eyes  Pupils equal ,round ,react to light and accomidate   Ears, Nose, Throat, Neck  Oral cavity clear oropharynx free of lesions neck supple without masses   Neurological  Memory - Normal. Cranial nerves - Cranial nerves II through XII grossly intact.   Lymph Detail  Submental. Submandibular. Anterior cervical. Posterior cervical. Supraclavicular.       ASSESSMENT AND PLAN:   1. Glossitis  She has a burning mouth syndrome.  I instructed her that I do not want her to use clotrimazole regularly.  I am going to try her on capsaicin and water rinse 4 times a day.  Many have found that this helps burning mouth syndrome and hopefully we can cut down on use of clotrimazole.      The patient indicates understanding of these issues and agrees to the plan.    No follow-ups on file.    Lavon Mariscal MD  5/1/2024  12:20 PM

## 2024-05-10 PROBLEM — D12.2 BENIGN NEOPLASM OF ASCENDING COLON: Status: ACTIVE | Noted: 2024-05-10

## 2024-05-10 PROBLEM — D12.4 BENIGN NEOPLASM OF DESCENDING COLON: Status: ACTIVE | Noted: 2024-05-10

## 2024-05-10 PROBLEM — Z12.11 SPECIAL SCREENING FOR MALIGNANT NEOPLASM OF COLON: Status: ACTIVE | Noted: 2024-05-10

## 2024-05-16 DIAGNOSIS — K14.6 BURNING MOUTH SYNDROME: ICD-10-CM

## 2024-05-16 RX ORDER — PREGABALIN 150 MG/1
150 CAPSULE ORAL 3 TIMES DAILY
Qty: 90 CAPSULE | Refills: 4 | Status: SHIPPED | OUTPATIENT
Start: 2024-05-16

## 2024-05-16 NOTE — TELEPHONE ENCOUNTER
Patient is currently taking 200 mg in the morning, 200 mg in the evening and 150 mg at night. Patient is asking if she could increase her medication to 200 mg three times a day.       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 No signs of other general neuropathic process  Rheum/sjogrens work up negative  Check zinc, thiamine, repeat B12   Increase Lyrica to 150/100/100mg, then 150mg TID if needed  Other option would be to add nortriptyline  Avoid using tobacco, limit acidic or spicy foods, avoid drinking carbonated beverages. Stress can exacerbate symptoms.  Discussed that this is often a chronic condition, rarely does go away

## 2024-05-23 NOTE — ASSESSMENT & PLAN NOTE
Last A1c value was 5.5% done 3/27/2024.    Sugar control is well controlled, no significant medication side effects noted, Pre-Diabetic.  Not currently on Diabetic meds.

## 2024-05-23 NOTE — ASSESSMENT & PLAN NOTE
Cholesterol shows Good control. Long term heart-healthy diet and lifestyle discussed and encouraged to reduce risk of cardiovascular disease.  Cholesterol: 164, done on 3/27/2024.  HDL Cholesterol: 41, done on 3/27/2024.  TriGlycerides 71, done on 3/27/2024.  LDL Cholesterol: 109, done on 3/27/2024.   No current Cholesterol medication.

## 2024-05-24 ENCOUNTER — OFFICE VISIT (OUTPATIENT)
Dept: FAMILY MEDICINE CLINIC | Facility: CLINIC | Age: 49
End: 2024-05-24

## 2024-05-24 VITALS
BODY MASS INDEX: 23.1 KG/M2 | WEIGHT: 138.63 LBS | HEIGHT: 65 IN | DIASTOLIC BLOOD PRESSURE: 70 MMHG | SYSTOLIC BLOOD PRESSURE: 100 MMHG | RESPIRATION RATE: 14 BRPM | HEART RATE: 80 BPM

## 2024-05-24 DIAGNOSIS — H02.9 EYELID ABNORMALITY: ICD-10-CM

## 2024-05-24 DIAGNOSIS — M79.7 FIBROMYALGIA: ICD-10-CM

## 2024-05-24 DIAGNOSIS — E78.6 LOW HDL (UNDER 40): ICD-10-CM

## 2024-05-24 DIAGNOSIS — R73.03 PREDIABETES: Primary | ICD-10-CM

## 2024-05-24 PROCEDURE — 99214 OFFICE O/P EST MOD 30 MIN: CPT | Performed by: FAMILY MEDICINE

## 2024-05-24 RX ORDER — PREGABALIN 200 MG/1
200 CAPSULE ORAL 3 TIMES DAILY
Qty: 270 CAPSULE | Refills: 1 | Status: SHIPPED | OUTPATIENT
Start: 2024-05-24

## 2024-05-24 RX ORDER — CLOTRIMAZOLE 10 MG/1
10 LOZENGE ORAL; TOPICAL 4 TIMES DAILY
Qty: 60 LOZENGE | Refills: 11 | Status: SHIPPED | OUTPATIENT
Start: 2024-05-24

## 2024-05-24 NOTE — PROGRESS NOTES
Rita is a 48 year old female coming in for had concerns including Lab (Blood work results ), Menopause (Has questions that would like to be addressed ), and Mouth/Lip Problem (Burn mouth syndrome ).    Subjective:   HPI   Seeing Dr Mariscal ENT and Dr Velez neurology as well.   Mouth burning contiues. Capsaicin helping a lot, trying tobasco sauce as source.    Objective:   /70   Pulse 80   Resp 14   Ht 5' 5\" (1.651 m)   Wt 138 lb 9.6 oz (62.9 kg)   LMP 02/26/2024 (Approximate)   BMI 23.06 kg/m²  Body mass index is 23.06 kg/m².   Physical Exam  Constitutional:       Appearance: She is well-developed.   HENT:      Head: Normocephalic and atraumatic.   Pulmonary:      Effort: Pulmonary effort is normal. No respiratory distress.   Musculoskeletal:         General: Normal range of motion.      Cervical back: Normal range of motion.   Skin:     Findings: No rash.   Neurological:      Mental Status: She is alert and oriented to person, place, and time.   Psychiatric:         Mood and Affect: Mood normal.         Behavior: Behavior normal.         Thought Content: Thought content normal.         Judgment: Judgment normal.           Assessment & Plan:   1. Prediabetes (Primary)  Overview:  A1c 5.7% 9/22/2021  Assessment & Plan:  Last A1c value was 5.5% done 3/27/2024.    Sugar control is well controlled, no significant medication side effects noted, Pre-Diabetic.  Not currently on Diabetic meds.        2. Low HDL (under 40)  Overview:  HDL 28 in 2018 EBCT with FHx CAD in father at 55    Assessment & Plan:  Cholesterol shows Good control. Long term heart-healthy diet and lifestyle discussed and encouraged to reduce risk of cardiovascular disease.  Cholesterol: 164, done on 3/27/2024.  HDL Cholesterol: 41, done on 3/27/2024.  TriGlycerides 71, done on 3/27/2024.  LDL Cholesterol: 109, done on 3/27/2024.   No current Cholesterol medication.     3. Fibromyalgia  -     Pregabalin; Take 1 capsule (200 mg total) by  mouth in the morning, at noon, and at bedtime.  Dispense: 270 capsule; Refill: 1  4. Eyelid abnormality  -     OPHTHALMOLOGY - INTERNAL  Other orders  -     Clotrimazole; Take 1 lozenge (10 mg total) by mouth 4 (four) times daily.  Dispense: 60 lozenge; Refill: 11   Increase to lyrica 200 TID, reassess if no change in 2 to 4 months.   I have discontinued Rita Wbeb's pregabalin, pregabalin, pregabalin, pregabalin, and pregabalin. I am also having her start on pregabalin. Additionally, I am having her maintain her ALPRAZolam ER, buPROPion ER, desvenlafaxine ER, amphetamine-dextroamphetamine ER, amphetamine-dextroamphetamine, PreviDent 5000 Dry Mouth, valACYclovir, and clotrimazole.       Return in about 6 months (around 11/24/2024).

## 2024-05-31 ENCOUNTER — OFFICE VISIT (OUTPATIENT)
Facility: CLINIC | Age: 49
End: 2024-05-31

## 2024-05-31 ENCOUNTER — TELEPHONE (OUTPATIENT)
Facility: CLINIC | Age: 49
End: 2024-05-31

## 2024-05-31 VITALS
WEIGHT: 139 LBS | SYSTOLIC BLOOD PRESSURE: 98 MMHG | BODY MASS INDEX: 23.16 KG/M2 | HEART RATE: 77 BPM | HEIGHT: 65 IN | DIASTOLIC BLOOD PRESSURE: 60 MMHG

## 2024-05-31 DIAGNOSIS — Z01.419 WELL WOMAN EXAM WITH ROUTINE GYNECOLOGICAL EXAM: Primary | ICD-10-CM

## 2024-05-31 DIAGNOSIS — N87.0 DYSPLASIA OF CERVIX, LOW GRADE (CIN 1): ICD-10-CM

## 2024-05-31 DIAGNOSIS — Z12.31 ENCOUNTER FOR SCREENING MAMMOGRAM FOR BREAST CANCER: ICD-10-CM

## 2024-05-31 DIAGNOSIS — N89.8 VAGINAL LEUKORRHEA: ICD-10-CM

## 2024-05-31 PROCEDURE — 99386 PREV VISIT NEW AGE 40-64: CPT | Performed by: OBSTETRICS & GYNECOLOGY

## 2024-05-31 PROCEDURE — 88175 CYTOPATH C/V AUTO FLUID REDO: CPT | Performed by: OBSTETRICS & GYNECOLOGY

## 2024-05-31 PROCEDURE — 81514 NFCT DS BV&VAGINITIS DNA ALG: CPT | Performed by: OBSTETRICS & GYNECOLOGY

## 2024-05-31 NOTE — PROGRESS NOTES
Rita Webb is a 48 year old female  Patient's last menstrual period was 2024 (exact date).   Chief Complaint   Patient presents with    Wellness Visit   .     She still has regular periods about every 30 days she has not skipped 1 they have not gotten closer together she bleeds for 5 to 6 days minimal cramping the first day no bleeding between.  Denies hot flashes or night sweats.  She complains of a recent vaginal discharge.  No itching or odor she had a colonoscopy and was told she can wait 5 years blood work is done with her primary.    OBSTETRICS HISTORY:  OB History    Para Term  AB Living   1 0 0 0 0 1   SAB IAB Ectopic Multiple Live Births   0 0 0   1      # Outcome Date GA Lbr Ajay/2nd Weight Sex Type Anes PTL Lv   1  08 38w0d  8 lb 15 oz (4.054 kg) M CS-Unspec   SERGIO      Birth Comments: System Generated. Please review and update pregnancy details.       GYNE HISTORY:  Periods regular monthly    History   Sexual Activity    Sexual activity: Yes    Partners: Male        Pap Date: 06/15/21  Pap Result Notes: negative        MEDICAL HISTORY:  Past Medical History:    Anxiety state    Chronic sore throat    Cyst (solitary) of breast    fibrocystic breast with many cysts    GERD (gastroesophageal reflux disease)    maybe silent reflux    History of stomach ulcers    HPV in female    Infertility, female    had in vitro    LPRD (laryngopharyngeal reflux disease)    Phlegm in throat    constant    Prediabetes    Skin cancer    on back BCC    Stomach ulcer    Stress    Stress headaches    Visual impairment    contacts       SURGICAL HISTORY:  Past Surgical History:   Procedure Laterality Date          Upper gi endoscopy - referral  2013    stomach ulcers       SOCIAL HISTORY:  Social History     Socioeconomic History    Marital status:      Spouse name: Not on file    Number of children: Not on file    Years of education: Not on file    Highest education  level: Not on file   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   Tobacco Use    Smoking status: Never     Passive exposure: Never    Smokeless tobacco: Never   Vaping Use    Vaping status: Never Used   Substance and Sexual Activity    Alcohol use: No    Drug use: No    Sexual activity: Yes     Partners: Male   Other Topics Concern    Caffeine Concern Yes     Comment: rarely    Exercise Yes     Comment: 3x week cardio    Seat Belt Yes    Special Diet Not Asked    Stress Concern Not Asked    Weight Concern Not Asked     Service Not Asked    Blood Transfusions Not Asked    Occupational Exposure Not Asked    Hobby Hazards Not Asked    Sleep Concern Not Asked    Back Care Not Asked    Bike Helmet Not Asked    Self-Exams No   Social History Narrative    Not on file     Social Determinants of Health     Financial Resource Strain: Not on file   Food Insecurity: Not on file   Transportation Needs: Not on file   Physical Activity: Not on file   Stress: Not on file   Social Connections: Unknown (3/14/2021)    [facility]    Social Connections     Conversations with friends/family/neighbors per week: Not on file   Housing Stability: Low Risk  (7/9/2021)    [facility]    Housing Stability     Mortgage Payment Concerns?: Not on file     Number of Places Lived in the Last Year: Not on file     Unstable Housing?: Not on file       FAMILY HISTORY:  Family History   Problem Relation Age of Onset    Heart Disease Father         heart attack    Breast Cancer Paternal Grandmother         dx age 40s    Prostate Cancer Maternal Grandfather         dx age 70s    Other (osteoporosis) Maternal Grandmother     No Known Problems Mother     No Known Problems Sister     No Known Problems Brother     No Known Problems Son     Breast Cancer Paternal Cousin Female 46    Diabetes Neg     Heart Disorder  Neg     Hypertension Neg     Lipids Neg     Obesity Neg     Psychiatric Neg        MEDICATIONS:    Current Outpatient Medications:     pregabalin (LYRICA) 200 MG Oral Cap, Take 1 capsule (200 mg total) by mouth in the morning, at noon, and at bedtime., Disp: 270 capsule, Rfl: 1    clotrimazole 10 MG Mouth/Throat Michelle, Take 1 lozenge (10 mg total) by mouth 4 (four) times daily., Disp: 60 lozenge, Rfl: 11    valACYclovir 1 G Oral Tab, Take 2 tablets (2,000 mg total) by mouth every 12 (twelve) hours. 2 doses per episode, Disp: 16 tablet, Rfl: 1    PREVIDENT 5000 DRY MOUTH 1.1 % Dental Gel, , Disp: , Rfl:     amphetamine-dextroamphetamine ER 30 MG Oral Capsule SR 24 Hr, Take 1 capsule (30 mg total) by mouth every morning., Disp: , Rfl:     amphetamine-dextroamphetamine 30 MG Oral Tab, Take 1 tablet (30 mg total) by mouth 2 (two) times daily., Disp: , Rfl:     desvenlafaxine  MG Oral Tablet 24 Hr, , Disp: , Rfl:     ALPRAZolam ER 2 MG Oral Tablet 24 Hr, Take 1 tablet (2 mg total) by mouth daily., Disp: , Rfl: 1    buPROPion HCl ER, XL, 150 MG Oral Tablet 24 Hr, Take 1 tablet (150 mg total) by mouth daily., Disp: , Rfl:     ALLERGIES:  No Known Allergies      Review of Systems:  Constitutional:  Denies fatigue, night sweats, hot flashes  Gastrointestinal:  denies heartburn, abdominal pain, diarrhea or constipation  Genitourinary:  denies dysuria, incontinence, abnormal vaginal discharge, vaginal itching  Skin/Breast:  Denies any breast pain, lumps, or discharge.   Neurological:  denies headaches, extremity weakness or numbness.      PHYSICAL EXAM:   Constitutional: well developed, well nourished  Head/Face: normocephalic  Neck/Thyroid: thyroid symmetric, no thyromegaly, no nodules, no adenopathy  Breast: normal without palpable masses, tenderness, asymmetry, nipple discharge, nipple retraction or skin changes  Abdomen:  soft, nontender, nondistended, no masses  Skin/Hair: no unusual rashes or bruises    Extremities: no edema, no cyanosis  Psychiatric:  Oriented to time, place, person and situation. Appropriate mood and affect    Pelvic Exam:  External Genitalia: normal appearance, hair distribution, and no lesions  Urethral Meatus:  normal in size, location, without lesions and prolapse  Bladder:  No fullness, masses or tenderness  Vagina:  Normal appearance without lesions, +abnormal discharge affirm  Cervix:  Normal without tenderness on motion without lesions Pap.  Uterus: normal in size, contour, position, mobility, without tenderness  Adnexa: normal without masses or tenderness  Perineum: normal  Anus: no hemorroids     Assessment & Plan:  Rita was seen today for wellness visit.    Diagnoses and all orders for this visit:    Well woman exam with routine gynecological exam    Dysplasia of cervix, low grade (ANNAMARIA 1)  -     ThinPrep PAP with HPV Reflex Request B; Future    Encounter for screening mammogram for breast cancer  -     [facility] EVIE 2D+3D SCREENING BILAT (CPT=77067/22264); Future

## 2024-06-04 LAB
BV BACTERIA DNA VAG QL NAA+PROBE: NEGATIVE
C GLABRATA DNA VAG QL NAA+PROBE: NEGATIVE
C KRUSEI DNA VAG QL NAA+PROBE: NEGATIVE
CANDIDA DNA VAG QL NAA+PROBE: NEGATIVE
T VAGINALIS DNA VAG QL NAA+PROBE: NEGATIVE

## 2024-06-04 RX ORDER — PREGABALIN 100 MG/1
100 CAPSULE ORAL 3 TIMES DAILY
Qty: 90 CAPSULE | Refills: 0 | OUTPATIENT
Start: 2024-06-04

## 2024-06-04 NOTE — TELEPHONE ENCOUNTER
Refill request for:    Requested Prescriptions     Pending Prescriptions Disp Refills    PREGABALIN 100 MG Oral Cap [Pharmacy Med Name: PREGABALIN 100MG CAPSULES] 90 capsule 0     Sig: TAKE 1 CAPSULE(100 MG) BY MOUTH THREE TIMES DAILY        Last Prescribed Quantity Refills   5/24/24 270 1     LOV 12/6/2023     Patient was asked to follow-up in: 6 months    Appointment due: November 2024    Appointment scheduled: 5/24/2024 Tomy Mohan MD    Medication not on protocol.     # 270 with 1 refills routed to Tomy Mohan MD for review

## 2024-06-07 LAB
.: ABNORMAL
.: ABNORMAL

## 2024-07-05 ENCOUNTER — OFFICE VISIT (OUTPATIENT)
Facility: CLINIC | Age: 49
End: 2024-07-05
Payer: COMMERCIAL

## 2024-07-05 VITALS
SYSTOLIC BLOOD PRESSURE: 110 MMHG | DIASTOLIC BLOOD PRESSURE: 64 MMHG | WEIGHT: 138.19 LBS | BODY MASS INDEX: 23.02 KG/M2 | HEART RATE: 87 BPM | HEIGHT: 65 IN

## 2024-07-05 DIAGNOSIS — N87.0 DYSPLASIA OF CERVIX, LOW GRADE (CIN 1): Primary | ICD-10-CM

## 2024-07-05 DIAGNOSIS — N87.9 DYSPLASIA OF CERVIX: ICD-10-CM

## 2024-07-05 LAB
CONTROL LINE PRESENT WITH A CLEAR BACKGROUND (YES/NO): YES YES/NO
KIT LOT #: NORMAL NUMERIC
PREGNANCY TEST, URINE: NEGATIVE

## 2024-07-05 PROCEDURE — 88305 TISSUE EXAM BY PATHOLOGIST: CPT | Performed by: OBSTETRICS & GYNECOLOGY

## 2024-07-05 NOTE — PROGRESS NOTES
Rita Webb is a 48 year old female  Patient's last menstrual period was 2024 (exact date).   Chief Complaint   Patient presents with    Colposcopy   .     ***    OBSTETRICS HISTORY:  OB History    Para Term  AB Living   1 0 0 0 0 1   SAB IAB Ectopic Multiple Live Births   0 0 0   1      # Outcome Date GA Lbr Ajay/2nd Weight Sex Type Anes PTL Lv   1  08 38w0d  8 lb 15 oz (4.054 kg) M CS-Unspec   SERGIO      Birth Comments: System Generated. Please review and update pregnancy details.       GYNE HISTORY:  Periods {EM Menses (multi):6156}    History   Sexual Activity    Sexual activity: Yes    Partners: Male                 MEDICAL HISTORY:  Past Medical History:    Anxiety state    Chronic sore throat    Cyst (solitary) of breast    fibrocystic breast with many cysts    GERD (gastroesophageal reflux disease)    maybe silent reflux    History of stomach ulcers    HPV in female    Infertility, female    had in vitro    LPRD (laryngopharyngeal reflux disease)    Phlegm in throat    constant    Prediabetes    Skin cancer    on back BCC    Stomach ulcer    Stress    Stress headaches    Visual impairment    contacts       SURGICAL HISTORY:  Past Surgical History:   Procedure Laterality Date          Upper gi endoscopy - referral  2013    stomach ulcers       SOCIAL HISTORY:  Social History     Socioeconomic History    Marital status:      Spouse name: Not on file    Number of children: Not on file    Years of education: Not on file    Highest education level: Not on file   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   Tobacco Use    Smoking status: Never     Passive exposure: Never    Smokeless tobacco: Never   Vaping Use    Vaping status: Never Used   Substance and Sexual Activity    Alcohol use: No    Drug use: No    Sexual activity: Yes     Partners: Male   Other Topics Concern    Caffeine Concern Yes     Comment: rarely    Exercise Yes     Comment: 3x  week cardio    Seat Belt Yes    Special Diet Not Asked    Stress Concern Not Asked    Weight Concern Not Asked     Service Not Asked    Blood Transfusions Not Asked    Occupational Exposure Not Asked    Hobby Hazards Not Asked    Sleep Concern Not Asked    Back Care Not Asked    Bike Helmet Not Asked    Self-Exams No   Social History Narrative    Not on file     Social Determinants of Health     Financial Resource Strain: Not on file   Food Insecurity: Not on file   Transportation Needs: Not on file   Physical Activity: Not on file   Stress: Not on file   Social Connections: Unknown (3/14/2021)    [facility]    Social Connections     Conversations with friends/family/neighbors per week: Not on file   Housing Stability: Low Risk  (7/9/2021)    [facility]    Housing Stability     Mortgage Payment Concerns?: Not on file     Number of Places Lived in the Last Year: Not on file     Unstable Housing?: Not on file       FAMILY HISTORY:  Family History   Problem Relation Age of Onset    Heart Disease Father         heart attack    Breast Cancer Paternal Grandmother         dx age 40s    Prostate Cancer Maternal Grandfather         dx age 70s    Other (osteoporosis) Maternal Grandmother     No Known Problems Mother     No Known Problems Sister     No Known Problems Brother     No Known Problems Son     Breast Cancer Paternal Cousin Female 46    Diabetes Neg     Heart Disorder Neg     Hypertension Neg     Lipids Neg     Obesity Neg     Psychiatric Neg        MEDICATIONS:    Current Outpatient Medications:     pregabalin (LYRICA) 200 MG Oral Cap, Take 1 capsule (200 mg total) by mouth in the morning, at noon, and at bedtime., Disp: 270 capsule, Rfl: 1    clotrimazole 10 MG Mouth/Throat Michelle, Take 1 lozenge (10 mg total) by mouth 4 (four) times daily., Disp: 60 lozenge, Rfl: 11     valACYclovir 1 G Oral Tab, Take 2 tablets (2,000 mg total) by mouth every 12 (twelve) hours. 2 doses per episode, Disp: 16 tablet, Rfl: 1    PREVIDENT 5000 DRY MOUTH 1.1 % Dental Gel, , Disp: , Rfl:     amphetamine-dextroamphetamine ER 30 MG Oral Capsule SR 24 Hr, Take 1 capsule (30 mg total) by mouth every morning., Disp: , Rfl:     amphetamine-dextroamphetamine 30 MG Oral Tab, Take 1 tablet (30 mg total) by mouth 2 (two) times daily., Disp: , Rfl:     desvenlafaxine  MG Oral Tablet 24 Hr, , Disp: , Rfl:     ALPRAZolam ER 2 MG Oral Tablet 24 Hr, Take 1 tablet (2 mg total) by mouth daily., Disp: , Rfl: 1    buPROPion HCl ER, XL, 150 MG Oral Tablet 24 Hr, Take 1 tablet (150 mg total) by mouth daily., Disp: , Rfl:     ALLERGIES:  No Known Allergies      Review of Systems:  Constitutional:  Denies fatigue, night sweats, hot flashes  Gastrointestinal:  denies heartburn, abdominal pain, diarrhea or constipation  Genitourinary:  denies dysuria, incontinence, abnormal vaginal discharge, vaginal itching  Skin/Breast:  Denies any breast pain, lumps, or discharge.   Neurological:  denies headaches, extremity weakness or numbness.      PHYSICAL EXAM:   Constitutional: well developed, well nourished  Head/Face: normocephalic  Neck/Thyroid: thyroid symmetric, no thyromegaly, no nodules, no adenopathy  Breast: normal without palpable masses, tenderness, asymmetry, nipple discharge, nipple retraction or skin changes  Abdomen:  soft, nontender, nondistended, no masses  Skin/Hair: no unusual rashes or bruises   Extremities: no edema, no cyanosis  Psychiatric:  Oriented to time, place, person and situation. Appropriate mood and affect    Pelvic Exam:  External Genitalia: normal appearance, hair distribution, and no lesions  Urethral Meatus:  normal in size, location, without lesions and prolapse  Bladder:  No fullness, masses or tenderness  Vagina:  Normal appearance without lesions, no abnormal discharge  Cervix:  Normal  without tenderness on motion  Uterus: normal in size, contour, position, mobility, without tenderness  Adnexa: normal without masses or tenderness  Perineum: normal  Anus: no hemorroids     Assessment & Plan:  There are no diagnoses linked to this encounter.    ***

## 2024-07-05 NOTE — PROGRESS NOTES
[facility]  Obstetrics and Gynecology  Colposcopy Procedure Note    Colpo w/Cx Biopsy and ECC    Pregnancy Results: negative from urine test     Consent signed.  Procedure discussed with patient in detail including indication, risk, benefits, alternatives and complications.    Pre-Procedure:  Pre-Meds:    Cervix prepped with:  Acetic acid    Procedure:  Under satisfactory analgesia, the patient was put in the dorsal lithotomy position.  Orange speculum was placed in the vagina.  Under colposcopic examination the transition zone was seen in entirety.   ECC done then cervical biopsy taken at 944468 and 11o'clock where increased acetowhite changes noted.  Patient tolerated procedure well.      Findings:  Acetowhite epithelium    Impression:  HPV changes    Iliana Dotson MD  12:33 PM  [unfilled]

## 2024-07-12 ENCOUNTER — TELEPHONE (OUTPATIENT)
Facility: CLINIC | Age: 49
End: 2024-07-12

## 2024-07-12 NOTE — TELEPHONE ENCOUNTER
Patient called to speak to a nurse to explain what infection she has and what to do after finishing treatment with cephalexin 250 MG Oral Cap.

## 2024-07-15 NOTE — TELEPHONE ENCOUNTER
Spoke with patient. Explained antibiotic was likely given due to acute and chronic inflammation noted on pathology report.   Patient educated on possible reasons for inflammation could include infections/unprotected sex. Patient states she has had increased discharge. Per chart review vaginitis swab done 5/31/24.   Patient would like to know what follow-up is recommended after she finishes her antibiotic.   Will route to provider for recommendation. Patient aware Dr. FRAZIER is out of office for 2 weeks.

## 2024-07-29 NOTE — TELEPHONE ENCOUNTER
Discussed 's recommendation to follow up for a 2 week f/u after surgery & pap smear in 1 year. Transferred to  to schedule . Patient verbalized understanding, agreed to and intend to comply with plan of care.

## 2024-07-31 ENCOUNTER — OFFICE VISIT (OUTPATIENT)
Facility: CLINIC | Age: 49
End: 2024-07-31
Payer: COMMERCIAL

## 2024-07-31 VITALS — BODY MASS INDEX: 22.3 KG/M2 | WEIGHT: 133.81 LBS | HEIGHT: 65 IN

## 2024-07-31 DIAGNOSIS — N76.0 VAGINITIS AND VULVOVAGINITIS, UNSPECIFIED: ICD-10-CM

## 2024-07-31 DIAGNOSIS — N76.0 VAGINITIS AND VULVOVAGINITIS: Primary | ICD-10-CM

## 2024-07-31 PROCEDURE — 87205 SMEAR GRAM STAIN: CPT | Performed by: OBSTETRICS & GYNECOLOGY

## 2024-07-31 PROCEDURE — 87075 CULTR BACTERIA EXCEPT BLOOD: CPT | Performed by: OBSTETRICS & GYNECOLOGY

## 2024-07-31 PROCEDURE — 99212 OFFICE O/P EST SF 10 MIN: CPT | Performed by: OBSTETRICS & GYNECOLOGY

## 2024-07-31 PROCEDURE — 87077 CULTURE AEROBIC IDENTIFY: CPT | Performed by: OBSTETRICS & GYNECOLOGY

## 2024-07-31 PROCEDURE — 87070 CULTURE OTHR SPECIMN AEROBIC: CPT | Performed by: OBSTETRICS & GYNECOLOGY

## 2024-07-31 NOTE — PROGRESS NOTES
She had a D&C which showed chronic and acute endometritis.  She took Keflex and is finished with that now.  Most daily she has a glob of clear discharge.  No odor no itching.  No dysuria no vaginal bleeding.  It was occurring before her D&C.  External genitalia without lesions.  Vagina has minimal discharge.  Vi core and anaerobic cultures were done no cervical lesions.  Probiotics refresh and boric acid were discussed.

## 2024-08-01 ENCOUNTER — TELEPHONE (OUTPATIENT)
Facility: CLINIC | Age: 49
End: 2024-08-01

## 2024-08-01 DIAGNOSIS — N76.0 VAGINITIS AND VULVOVAGINITIS, UNSPECIFIED: Primary | ICD-10-CM

## 2024-08-01 NOTE — TELEPHONE ENCOUNTER
[facility] called to inform the office the incorrect swab was used to collect an anaerobic culture and would like to clarify Dr. Dotson's order. Lab states they can only conduct an aerobic test.    Please call [phone]

## 2024-08-01 NOTE — TELEPHONE ENCOUNTER
Spoke with Dr. FRAZIER and lab. Order of aerobic culture placed and routed for signature. Lab aware of new order being placed.

## 2024-08-02 NOTE — PROGRESS NOTES
Spoke with patient regarding results for both her aerobic bacterial culture and Vikor. Dr. Dotson advised both were normal. Dr. Dotson verbally recommended Clairvee for her symptoms. Patient understands verbally.

## 2024-08-05 ENCOUNTER — OFFICE VISIT (OUTPATIENT)
Dept: NEUROLOGY | Facility: CLINIC | Age: 49
End: 2024-08-05
Payer: COMMERCIAL

## 2024-08-05 VITALS
BODY MASS INDEX: 22 KG/M2 | HEART RATE: 96 BPM | SYSTOLIC BLOOD PRESSURE: 122 MMHG | OXYGEN SATURATION: 99 % | WEIGHT: 134 LBS | DIASTOLIC BLOOD PRESSURE: 82 MMHG

## 2024-08-05 DIAGNOSIS — K14.6 BURNING MOUTH SYNDROME: Primary | ICD-10-CM

## 2024-08-05 DIAGNOSIS — F32.0 CURRENT MILD EPISODE OF MAJOR DEPRESSIVE DISORDER, UNSPECIFIED WHETHER RECURRENT (HCC): ICD-10-CM

## 2024-08-05 PROCEDURE — 99213 OFFICE O/P EST LOW 20 MIN: CPT | Performed by: OTHER

## 2024-08-05 RX ORDER — NORTRIPTYLINE HYDROCHLORIDE 10 MG/1
CAPSULE ORAL
Qty: 60 CAPSULE | Refills: 1 | Status: SHIPPED | OUTPATIENT
Start: 2024-08-05

## 2024-08-05 RX ORDER — PREGABALIN 50 MG/1
CAPSULE ORAL
Qty: 90 CAPSULE | Refills: 0 | Status: SHIPPED | OUTPATIENT
Start: 2024-08-05

## 2024-08-05 NOTE — PATIENT INSTRUCTIONS
Refill policies:    Allow 2-3 business days for refills; controlled substances may take longer.  Contact your pharmacy at least 5 days prior to running out of medication and have them send an electronic request or submit request through the “request refill” option in your [employer] account.  Refills are not addressed on weekends; covering physicians do not authorize routine medications on weekends.  No narcotics or controlled substances are refilled after noon on Fridays or by on call physicians.  By law, narcotics must be electronically prescribed.  A 30 day supply with no refills is the maximum allowed.  If your prescription is due for a refill, you may be due for a follow up appointment.  To best provide you care, patients receiving routine medications need to be seen at least once a year.  Patients receiving narcotic/controlled substance medications need to be seen at least once every 3 months.  In the event that your preferred pharmacy does not have the requested medication in stock (e.g. Backordered), it is your responsibility to find another pharmacy that has the requested medication available.  We will gladly send a new prescription to that pharmacy at your request.    Scheduling Tests:    If your physician has ordered radiology tests such as MRI or CT scans, please contact Central Scheduling at [phone] right away to schedule the test.  Once scheduled, the [facility] Centralized Referral Team will work with your insurance carrier to obtain pre-certification or prior authorization.  Depending on your insurance carrier, approval may take 3-10 days.  It is highly recommended patients assure they have received an authorization before having a test performed.  If test is done without insurance authorization, patient may be responsible for the entire amount billed.      Precertification and Prior Authorizations:  If your physician has recommended that you have a procedure or additional testing performed the [facility]  Centralized Referral Team will contact your insurance carrier to obtain pre-certification or prior authorization.    You are strongly encouraged to contact your insurance carrier to verify that your procedure/test has been approved and is a COVERED benefit.  Although the [facility] Centralized Referral Team does its due diligence, the insurance carrier gives the disclaimer that \"Although the procedure is authorized, this does not guarantee payment.\"    Ultimately the patient is responsible for payment.   Thank you for your understanding in this matter.  Paperwork Completion:  If you require FMLA or disability paperwork for your recovery, please make sure to either drop it off or have it faxed to our office at [phone]. Be sure the form has your name and date of birth on it.  The form will be faxed to our Forms Department and they will complete it for you.  There is a 25$ fee for all forms that need to be filled out.  Please be aware there is a 10-14 day turnaround time.  You will need to sign a release of information (SU) form if your paperwork does not come with one.  You may call the Forms Department with any questions at [phone].  Their fax number is [phone].

## 2024-08-05 NOTE — PROGRESS NOTES
[facility] - [place]   Neurology    Rita Webb Patient Status:  No patient class for patient encounter    1975 MRN [id number]   Location [facility] PCP Tomy Mohan MD              HPI:   Rita Webb is a(n) 48 year old female who presents at the request of Tomy Mohan MD for evaluation of burning in the tongue. It started about a year ago, entire mid to anterior tongue, as well as sides of the tongue. It is a burning sensation, without numbness. Denies symptoms in the arms or legs. She has rheumatologic work up, which came back negative, including Sjogrens. Also saw ENT. She was started on gabapentin, which did not help. Was given clotrimazole lozenges, which seemed to numb the tongue. She started Lyrica 100mg TID, which helps her symptoms.   Interim  Reports still has burning on the top of the tongue, and Lyrica 200mg TID, but 4-5 hours later seems to wear off. Reports history of dry mouth, but doesn't think has it anymore. Drinks about 45 oz.        Pertinent imaging and laboratory work-up:   Component      Latest Ref Rng 3/4/2024   Vitamin B12      193 - 986 pg/mL 411    ZINC      44 - 115 ug/dL 57    Iron, Serum      50 - 170 ug/dL 72    VITAMIN B1 (THIAMINE), WHOLE B      66.5 - 200.0 nmol/L 133.9        EMG   Conclusion:  1.  There is no electrophysiologic evidence of a median mononeuropathy, or of any other mononeuropathies, on this study.  2.  There is no electrographic evidence of a cervical radiculopathy.    Component      Latest Ref Rng 2023   PROTEIN, TOTAL      5.7 - 8.2 g/dL  7.0    Albumin      3.75 - 5.21 g/dL  4.14    ALPHA-1-GLOBULINS      0.19 - 0.46 g/dL  0.27    ALPHA-2-GLOBULINS      0.48 - 1.05 g/dL  0.68    BETA GLOBULINS      0.68 - 1.23 g/dL  0.81    GAMMA GLOBULINS      0.62 - 1.70 g/dL  1.10    ALBUMIN/GLOBULIN RATIO      1.00 - 2.00   1.45    SPE INTERPRETATION  No apparent monoclonal  protein on serum electrophoresis.…    KAPPA FREE LIGHT CHAIN      0.330 - 1.940 mg/dL  2.089 (H)    LAMBDA FREE LIGHT CHAIN      0.571 - 2.630 mg/dL  1.578    KAPPA/LAMBDA FLC RATIO      0.26 - 1.65   1.32    HEMOGLOBIN A1c      <5.7 % 5.5     ESTIMATED AVERAGE GLUCOSE      68 - 126 mg/dL 111     TSH      0.358 - 3.740 mIU/mL 1.730     Vitamin B12      193 - 986 pg/mL 699  332    FOLATE (FOLIC ACID), SERUM      >=8.7 ng/mL 15.1     VITAMIN B6      3.4 - 65.2 ug/L  39.9    Anti-SSA Antibody, IGG      <7 U/mL  <0.4    Anti-SSB Antibody, IGG      <7 U/mL  <0.4         Past Medical History:  Past Medical History:    Anxiety state    Chronic sore throat    Cyst (solitary) of breast    fibrocystic breast with many cysts    GERD (gastroesophageal reflux disease)    maybe silent reflux    History of stomach ulcers    HPV in female    Infertility, female    had in vitro    LPRD (laryngopharyngeal reflux disease)    Phlegm in throat    constant    Prediabetes    Skin cancer    on back BCC    Stomach ulcer    Stress    Stress headaches    Visual impairment    contacts        Past Surgical History:  Past Surgical History:   Procedure Laterality Date          Upper gi endoscopy - referral  2013    stomach ulcers       Family History:  family history includes Breast Cancer in her paternal grandmother; Breast Cancer (age of onset: 46) in her paternal cousin female; Heart Disease in her father; No Known Problems in her brother, mother, sister, and son; Prostate Cancer in her maternal grandfather; osteoporosis in her maternal grandmother.      Social History:   reports that she has never smoked. She has never been exposed to tobacco smoke. She has never used smokeless tobacco. She reports that she does not drink alcohol and does not use drugs.    Allergies:  No Known Allergies    MEDICATIONS:    Current Outpatient Medications:     nortriptyline 10 MG Oral Cap, Take 1 cap nightly for 3 weeks, then increase to 2 caps  nightly if tolerating, Disp: 60 capsule, Rfl: 1    pregabalin (LYRICA) 50 MG Oral Cap, Take 50mg tabs with 150mg tabs for total 200mg three times daily, Disp: 90 capsule, Rfl: 0    pregabalin (LYRICA) 200 MG Oral Cap, Take 1 capsule (200 mg total) by mouth in the morning, at noon, and at bedtime., Disp: 270 capsule, Rfl: 1    clotrimazole 10 MG Mouth/Throat Michelle, Take 1 lozenge (10 mg total) by mouth 4 (four) times daily., Disp: 60 lozenge, Rfl: 11    valACYclovir 1 G Oral Tab, Take 2 tablets (2,000 mg total) by mouth every 12 (twelve) hours. 2 doses per episode, Disp: 16 tablet, Rfl: 1    PREVIDENT 5000 DRY MOUTH 1.1 % Dental Gel, , Disp: , Rfl:     amphetamine-dextroamphetamine ER 30 MG Oral Capsule SR 24 Hr, Take 1 capsule (30 mg total) by mouth every morning., Disp: , Rfl:     amphetamine-dextroamphetamine 30 MG Oral Tab, Take 1 tablet (30 mg total) by mouth 2 (two) times daily., Disp: , Rfl:     desvenlafaxine  MG Oral Tablet 24 Hr, , Disp: , Rfl:     ALPRAZolam ER 2 MG Oral Tablet 24 Hr, Take 1 tablet (2 mg total) by mouth daily., Disp: , Rfl: 1    buPROPion HCl ER, XL, 150 MG Oral Tablet 24 Hr, Take 1 tablet (150 mg total) by mouth daily., Disp: , Rfl:       Review of Systems:   A comprehensive 10 point review of systems was completed.     Pertinent positives and negatives noted in the HPI.         PHYSICAL EXAM:   Neurologic Exam  Vitals  Vitals:    08/05/24 1141   BP: 122/82   Pulse: 96     General Appearance: Patient is a 48 year old female in no acute distress  Cardiac: Normal rate & regular rhythm  Skin: There are no rashes or other skin lesions.  Musculoskeletal: There is no scoliosis, or joint deformities  Neurologic examination:  Mental status: Patient is alert, attentive, and oriented x 3. Language is coherent and fluent without aphasia. Memory, comprehension and ability to follow commands were intact.   Cranial nerves II-XII: Optic discs were sharp. Pupils were round and reacted to light.  Extraocular movements were full. There was no face, jaw, palate or tongue weakness or atrophy. Facial sensation was normal. Hearing was grossly intact. Shoulder shrug was normal.   Motor exam revealed normal muscle bulk and tone. No atrophy or fasciculations. Manual muscle testing revealed MRC grade 5/5 strength throughout including proximal and distal muscles of the arms and legs.  Deep tendon reflexes were 2 at the biceps, brachioradialis, triceps, knee jerk, and ankle jerk. Plantar responses were flexor bilaterally.   Sensory exam revealed normal light touch perception. Vibratory perception and proprioception were intact at the toes. Pinprick and temperature were normal. Romberg sign was absent.  Complex motor skills revealed normal coordination. Finger-nose-finger intact.   Gait was narrow and stable, was able to walk on heels, toes and tandem without any difficulty.     ASSESSMENT/ACTIVE PROBLEM LIST:     Encounter Diagnoses   Name Primary?    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 No signs of other general neuropathic process  EMG and lab work up negative  Discussed nortriptyline, and possible side effects including dry mouth, constipation, weight gain. Watch for dry mouth given her history of dry mouth, increase water intake to 60 oz per day  Trial small dose of nortriptyline 10mg, then 20mg if needed  Cymbalta would not be recommended, as she is on an SNRI  Discussed serotonin syndrome  Continue Lyrica 200mg TID, 9a, 2p, and 7pm  Rheum/sjogrens, nutritional work up negative  Avoid using tobacco, limit acidic or spicy foods, avoid drinking carbonated beverages. Stress can exacerbate symptoms.      Requested Prescriptions     Signed Prescriptions Disp Refills    nortriptyline 10 MG Oral Cap 60 capsule 1      Sig: Take 1 cap nightly for 3 weeks, then increase to 2 caps nightly if tolerating    pregabalin (LYRICA) 50 MG Oral Cap 90 capsule 0     Sig: Take 50mg tabs with 150mg tabs for total 200mg three times daily          We discussed in depth regarding the diagnosis, prognosis, treatment. The patient was given ample opportunity to ask questions. All questions and concerns were addressed.     Debra Ng DO  Neuromuscular and General Neurology  [facility]

## 2024-09-07 ENCOUNTER — TELEPHONE (OUTPATIENT)
Dept: FAMILY MEDICINE CLINIC | Facility: CLINIC | Age: 49
End: 2024-09-07

## 2024-11-04 ENCOUNTER — TELEPHONE (OUTPATIENT)
Facility: CLINIC | Age: 49
End: 2024-11-04

## 2024-11-04 DIAGNOSIS — M25.521 RIGHT ELBOW PAIN: Primary | ICD-10-CM

## 2024-11-04 NOTE — TELEPHONE ENCOUNTER
Patient sched with Dr. Meza for right elbow tendinitis. No recent imaging in epic. Please advise if imaging needed for appt.     Future Appointments   Date Time Provider Department Center   11/7/2024 12:30 PM Bradford Meza MD [facility] [address]   1/23/2025 11:35 AM Edith Ng DO [facility] [address]

## 2024-11-07 ENCOUNTER — HOSPITAL ENCOUNTER (OUTPATIENT)
Dept: GENERAL RADIOLOGY | Age: 49
Discharge: HOME | End: 2024-11-07
Attending: STUDENT IN AN ORGANIZED HEALTH CARE EDUCATION/TRAINING PROGRAM
Payer: COMMERCIAL

## 2024-11-07 ENCOUNTER — OFFICE VISIT (OUTPATIENT)
Facility: CLINIC | Age: 49
End: 2024-11-07
Payer: COMMERCIAL

## 2024-11-07 VITALS — WEIGHT: 135 LBS | BODY MASS INDEX: 22.22 KG/M2 | HEIGHT: 65.5 IN

## 2024-11-07 DIAGNOSIS — M77.01 MEDIAL EPICONDYLITIS OF RIGHT ELBOW: Primary | ICD-10-CM

## 2024-11-07 DIAGNOSIS — M25.521 RIGHT ELBOW PAIN: ICD-10-CM

## 2024-11-07 PROCEDURE — 73080 X-RAY EXAM OF ELBOW: CPT | Performed by: STUDENT IN AN ORGANIZED HEALTH CARE EDUCATION/TRAINING PROGRAM

## 2024-11-07 PROCEDURE — 99203 OFFICE O/P NEW LOW 30 MIN: CPT | Performed by: STUDENT IN AN ORGANIZED HEALTH CARE EDUCATION/TRAINING PROGRAM

## 2024-11-07 NOTE — PROGRESS NOTES
Clinic Note     Allergies[1]    CC: right medial elbow pain    HPI: This 48 year old RHD female presents with right medial elbow pain. Ongoing for several months. No treatment thus far. She has point tenderness over the medial epicondyle. No numbness or tingling in the extremity or digits.     Onset: Gradual  Pain Character: throbbing, sharp  Pain Level: mild    Treatments Tried: none    Review of Systems:  Negative unless stated in HPI.    History/Other:   Past Medical History:    Anxiety    Anxiety state    Chronic sore throat    Cyst (solitary) of breast    fibrocystic breast with many cysts    GERD (gastroesophageal reflux disease)    maybe silent reflux    History of stomach ulcers    HPV in female    Infertility, female    had in vitro    LPRD (laryngopharyngeal reflux disease)    Phlegm in throat    constant    Prediabetes    Screening mammogram for breast cancer    There is no mammographic evidence of malignancy.   RECOMMENDATION: A 1 year screening mammogram is recommended.  Screening Breast MRI is recommended at 6 month intervals with annual screening mammography.    Skin cancer    on back BCC    Stomach ulcer    Stress    Stress headaches    Visual impairment    contacts     Past Surgical History:   Procedure Laterality Date      2008    Colonoscopy  2024    Upper gi endoscopy - referral  2013    stomach ulcers     Current Outpatient Medications   Medication Sig Dispense Refill    nortriptyline 10 MG Oral Cap Take 1 cap nightly for 3 weeks, then increase to 2 caps nightly if tolerating 60 capsule 1    pregabalin (LYRICA) 50 MG Oral Cap Take 50mg tabs with 150mg tabs for total 200mg three times daily 90 capsule 0    pregabalin (LYRICA) 200 MG Oral Cap Take 1 capsule (200 mg total) by mouth in the morning, at noon, and at bedtime. 270 capsule 1    clotrimazole 10 MG Mouth/Throat Michelle Take 1 lozenge (10 mg total) by mouth 4 (four) times daily. 60 lozenge 11    valACYclovir 1 G  Oral Tab Take 2 tablets (2,000 mg total) by mouth every 12 (twelve) hours. 2 doses per episode 16 tablet 1    PREVIDENT 5000 DRY MOUTH 1.1 % Dental Gel       amphetamine-dextroamphetamine ER 30 MG Oral Capsule SR 24 Hr Take 1 capsule (30 mg total) by mouth every morning.      amphetamine-dextroamphetamine 30 MG Oral Tab Take 1 tablet (30 mg total) by mouth 2 (two) times daily.      desvenlafaxine  MG Oral Tablet 24 Hr       ALPRAZolam ER 2 MG Oral Tablet 24 Hr Take 1 tablet (2 mg total) by mouth daily.  1    buPROPion HCl ER, XL, 150 MG Oral Tablet 24 Hr Take 1 tablet (150 mg total) by mouth daily.       Family History   Problem Relation Age of Onset    Heart Disease Father         6 bypass    Breast Cancer Paternal Grandmother         dx age 40s    Cancer Paternal Grandmother         Passed away at 43    Prostate Cancer Maternal Grandfather         dx age 70s    Cancer Maternal Grandfather     Other (osteoporosis) Maternal Grandmother     No Known Problems Mother     No Known Problems Sister     No Known Problems Brother     No Known Problems Son     Breast Cancer Paternal Cousin Female 46    Diabetes Neg     Heart Disorder Neg     Hypertension Neg     Lipids Neg     Obesity Neg     Psychiatric Neg      Social History     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 AOx3. Well-developed and Well-nourished.   Psychiatric: Normal mood/ affect/ behavior. Judgment and thought content normal.     Right Upper Extremity:     Inspection    Skin intact. No skin lesions. No obvious mass visualized. No crepitus to ROM.    Palpation    No instability to stress of the medial and lateral collateral ligaments.  No tenderness to palpation at the radiocapitellar joint, radial head, lateral epicondylar regions, radial tunnel. Tender to palpation directly over the medial epicondyle. Biceps and Triceps are palpable and grossly intact.       ROM    ROM is grossly normal including flexion / extension, and pronosupination of the forearm.    Pain with resisted wrist flexion and pronation.     Neurovascular    Normal sensation in the median, ulnar, and radial nerve distribution. Normal motor function of muscles innervated by median/AIN, ulnar, and radial/PIN nerves.    Normally perfused hand(s).               Diagnostic Studies:  I reviewed the images independently from the professional interpretation, and discussed my interpretation of the pertinent findings with patient/family.    Xrays of Right elbow 4V: On my independent review of the radiographs, there is no evidence of acute bony abnormality.     Assessment/Plan:  48 year old female with right elbow pain consistent with medial epicondylitis.    I reviewed the patient's electronic medical record, including the pertinent office notes, medical/surgical history, medications and images. I specifically reviewed the images noted above, independently and discussed my independent interpretation of these images in combination with the pertinent positive and negative findings in my clinical exam with the patient    right elbow medial epicondylitis.    The nature of the condition was discussed with the patient today including reviewing the xrays. The risks/benefits, pros/cons and reasonable expectations of treatment options were also discussed today. Education and counseling was given for the above diagnosis. Discussed with the patient that medial epicondylitis tends to have a very protracted course.   patients may stay symptomatic for up to 2 years. Conservative treatment is the mainstay. Surgical  treatment is reserved for patients who fail extensive conservative treatment.  Patient has had no treatment so far so will start with conservative measures.  We touched upon the option for injections which may provide short-term relief but also carries with it some risks, possibly weakening of the tendon origin. I therefore did not recommend steroid injection but we discussed the option for platelet rich plasma which some of my colleagues offer.  This is unfortunately not approved by most insurances and would be an out-of-pocket cost. This treatment is not associated with an increased risk of tendon degeneration, weakening or rupture. In addition we have discussed bracing and therapy. The patient would like to begin with this.     OT prescription provided today. All questions answered.      Follow Up: 8 weeks, ok to cancel if doing well    Bradford Meza MD   Hand, Wrist, & Elbow Surgery  [email or web address]       [1] No Known Allergies

## 2024-11-08 ENCOUNTER — PATIENT MESSAGE (OUTPATIENT)
Facility: CLINIC | Age: 49
End: 2024-11-08

## 2024-11-15 ENCOUNTER — APPOINTMENT (OUTPATIENT)
Dept: PHYSICAL THERAPY | Age: 49
End: 2024-11-15
Attending: STUDENT IN AN ORGANIZED HEALTH CARE EDUCATION/TRAINING PROGRAM
Payer: COMMERCIAL

## 2024-11-18 ENCOUNTER — OFFICE VISIT (OUTPATIENT)
Facility: CLINIC | Age: 49
End: 2024-11-18
Payer: COMMERCIAL

## 2024-11-18 ENCOUNTER — APPOINTMENT (OUTPATIENT)
Dept: PHYSICAL THERAPY | Age: 49
End: 2024-11-18
Attending: STUDENT IN AN ORGANIZED HEALTH CARE EDUCATION/TRAINING PROGRAM
Payer: COMMERCIAL

## 2024-11-18 VITALS — HEIGHT: 65.5 IN | BODY MASS INDEX: 22.22 KG/M2 | WEIGHT: 135 LBS

## 2024-11-18 DIAGNOSIS — M77.01 MEDIAL EPICONDYLITIS OF RIGHT ELBOW: Primary | ICD-10-CM

## 2024-11-18 PROCEDURE — 99204 OFFICE O/P NEW MOD 45 MIN: CPT | Performed by: FAMILY MEDICINE

## 2024-11-18 NOTE — H&P
Sports Medicine Clinic Note    Subjective:    Chief Complaint: Right medial elbow pain.    History: The patient, a 49-year-old right-hand-dominant female, presents with persistent right medial elbow pain consistent with medial epicondylitis. She has had ongoing symptoms for several months with no prior treatment. Pain is described as throbbing and sharp, exacerbated by wrist flexion and pronation. There is no associated numbness or tingling. The patient expresses interest in pursuing platelet-rich plasma injection, as previously discussed with the referring provider, and seeks further clarification on the process and expectations. She has been having trouble scheduling PT and is hoping to explore options outside of PT.    Objective:    Right Elbow Examination:    Inspection: Skin intact without lesions or deformity. No masses or crepitus observed during range of motion.  Palpation: Point tenderness localized to the medial epicondyle. No tenderness over the lateral epicondyle, radiocapitellar joint, radial head, or radial tunnel. Biceps and triceps palpable and grossly intact. No instability noted with medial or lateral collateral ligament stress.  Range of Motion: Full range of motion in flexion, extension, pronation, and supination. Pain elicited with resisted wrist flexion and pronation.  Neurovascular: Sensation intact to light touch in the median, ulnar, and radial nerve distributions. Normal motor function of muscles innervated by these nerves. Hand is well-perfused with brisk capillary refill.    Diagnostic Tests:    Review of prior radiographs demonstrates no evidence of acute bony abnormalities. No joint space narrowing, osteophytes, or fractures identified.    Assessment:    Right medial epicondylitis.  Chronic inflammation at the common flexor tendon origin with localized tenderness and functional impairment.    Plan:    Procedures: PRP injection is scheduled per patient preference. Confirmed that the  patient is not currently on NSAIDs. The protocol and phases of recovery were reviewed in detail, including pre-procedural and post-procedural guidelines. Patient provided with educational materials on PRP therapy and protocol. All questions answered.  Therapy: Patient previously prescribed physical therapy. Advised that the best outcomes for PRP injection would be combined with formal PT in my opinion, but am happy to prescribe a HEP for her to follow if PT continues to be hard for her to schedule/attend.  Medications: Reinforced the importance of avoiding NSAIDs for two weeks prior to and two weeks after the injection. Acetaminophen is acceptable for pain management. Can consider Tramadol for breakthrough pain post-procedure.  Activity Recommendations: Avoid activities that aggravate symptoms, particularly wrist flexion and pronation, until after the procedure.  Discussed post-injection rehabilitation phases, emphasizing rest initially, followed by gradual return to activity per protocol.    Follow-Up: Tentatively scheduled to assess response 8-12 weeks post-injection or sooner if symptoms worsen.      Nabeel Lim DO, CAQSM   Primary Care Sports Medicine

## 2024-11-20 ENCOUNTER — APPOINTMENT (OUTPATIENT)
Dept: PHYSICAL THERAPY | Age: 49
End: 2024-11-20
Attending: STUDENT IN AN ORGANIZED HEALTH CARE EDUCATION/TRAINING PROGRAM
Payer: COMMERCIAL

## 2024-11-20 DIAGNOSIS — M79.7 FIBROMYALGIA: ICD-10-CM

## 2024-12-03 RX ORDER — CLOTRIMAZOLE 10 MG/1
LOZENGE ORAL
Qty: 60 TROCHE | Refills: 0 | Status: SHIPPED | OUTPATIENT
Start: 2024-12-03

## 2024-12-03 NOTE — TELEPHONE ENCOUNTER
Refill request for:    Requested Prescriptions     Pending Prescriptions Disp Refills    CLOTRIMAZOLE 10 MG Mouth/Throat Michelle [Pharmacy Med Name: CLOTRIMAZOLE 10MG LOZENGE] 60 Michelle 0     Sig: TAKE 1 LOZENGE BY MOUTH FOUR TIMES DAILY        Last Prescribed Quantity Refills   5/24/24 60 11     LOV 5/24/2024     Patient was asked to follow-up in: 6 months    Appointment due: November 2024    Appointment scheduled: Visit date not found    Medication not on protocol.     # 60 with 11 refills routed to Tomy Mohan MD for review

## 2024-12-04 DIAGNOSIS — M79.7 FIBROMYALGIA: ICD-10-CM

## 2024-12-05 RX ORDER — PREGABALIN 200 MG/1
200 CAPSULE ORAL 3 TIMES DAILY
Qty: 270 CAPSULE | Refills: 0 | Status: SHIPPED | OUTPATIENT
Start: 2024-12-05

## 2024-12-06 ENCOUNTER — OFFICE VISIT (OUTPATIENT)
Facility: CLINIC | Age: 49
End: 2024-12-06

## 2024-12-06 VITALS — HEIGHT: 65.5 IN | WEIGHT: 135 LBS | BODY MASS INDEX: 22.22 KG/M2

## 2024-12-06 DIAGNOSIS — M77.01 MEDIAL EPICONDYLITIS OF RIGHT ELBOW: ICD-10-CM

## 2024-12-06 DIAGNOSIS — M77.12 LEFT LATERAL EPICONDYLITIS: Primary | ICD-10-CM

## 2024-12-06 RX ORDER — TRAMADOL HYDROCHLORIDE 50 MG/1
TABLET ORAL
Qty: 24 TABLET | Refills: 1 | Status: SHIPPED | OUTPATIENT
Start: 2024-12-06

## 2024-12-06 NOTE — PATIENT INSTRUCTIONS
Platelet Rich Plasma (PRP) Therapy Post-Procedure Instructions    Ligament Injections (knee, elbow, Achilles) or tendon (rotator cuff, hip glutes, quads)  - Bracing or compression wraps around area of injured tissue. Alternating ice and heat, increase weight bearing as tolerated on leg with crutches or walker for the first 2 weeks. Gradually increase weight over weeks 3 - 4. Do not use anti-inflammatory meds (Motrin, Aleve, Voltaren, Turmeric, Curcumin) for 1 week before injection or for 1 month after. The body’s natural inflammation response is vital to healing the area of injury. It is okay to take Acetaminophen (Tylenol), but do not exceed 4000 mg/day. Those on low dose aspirin may continue that after an injection. Pain pills are allowed (but rarely needed), as are muscle relaxants.  - Do not go to physical therapy for 2 weeks, as excessive activity is best avoided. Light stretching can be done at home. Use caution with activity no matter how good you feel.  - You may see a return to your old pain level shortly after the injection, but this will improve is weeks 2 - 6 and peak at week 12 with PRP injections. If no response is seen after 3 months, consider a second booster PRP injection to the area.

## 2024-12-09 ENCOUNTER — PATIENT MESSAGE (OUTPATIENT)
Facility: CLINIC | Age: 49
End: 2024-12-09

## 2024-12-09 NOTE — PROCEDURES
PRP Procedure Note:    After discussion of the risks and benefits, the patient elected to proceed with an US guided PRP injection into the bilateral common extensor tenosynovial sheath near its origin on the lateral epicondyle of the humerus. Confirmed that the patient does not have history of prior adverse reactions, active infections, or relevant allergies. There was no erythema or warmth, and the skin was clear.    For each side:    The skin was sterilized with ChloraPrep. Local anesthesia was achieved at the injection site and along needle trajectory with 2 mL of 1% Lidocaine without Epinephrine with a 25 gauge needle. A 22 gauge needle was then inserted via distal approach using US for needle guidance and placement. The site was injected with a total of 10 mL of PRP. The injection was completed without complication, and a bandage was applied.    Complications: The patient tolerated the procedure well without any complications.    Post-Injection Care: The patient tolerated the procedure well. An occlusive bandage was placed over the injection site. Post-injection care instructions provided to the patient (see AVS). Patient was instructed to watch for fever, increased swelling, or persistent pain. The patient will call immediately with any signs of infection or adverse reaction.      Nabeel Lim DO, [facility]   Primary Care Sports Medicine

## 2024-12-10 NOTE — TELEPHONE ENCOUNTER
Yes unfortunately likely needs to leave the system for a more timely initiation... to be honest I am not familiar with outside Pts that take [employer] I feel like the safe bet with them is [employer] PPO and medicare I hesitate to recommend someone only for them to give her out of network pricing I would have her call her insurance and ask for recommendations.

## 2024-12-10 NOTE — TELEPHONE ENCOUNTER
Stretches would include wrist flexion/extension passively as mainstay, can use the other hand to stretch the hand back and forth

## 2024-12-10 NOTE — TELEPHONE ENCOUNTER
Pt had PRP on 12/06 and can't get into PT until 1/06. Advised that she can try other facilities in her Network~    Please advise if there are any stretching exercises that she can start at home when ready?    \"Do not go to physical therapy for 2 weeks, as excessive activity is best avoided. Light stretching can be done at home. Use caution with activity no matter how good you feel.\"

## 2024-12-12 ENCOUNTER — TELEPHONE (OUTPATIENT)
Dept: FAMILY MEDICINE CLINIC | Facility: CLINIC | Age: 49
End: 2024-12-12

## 2024-12-12 DIAGNOSIS — Z13.0 SCREENING FOR IRON DEFICIENCY ANEMIA: ICD-10-CM

## 2024-12-12 DIAGNOSIS — Z13.29 SCREENING FOR THYROID DISORDER: ICD-10-CM

## 2024-12-12 DIAGNOSIS — Z13.1 SCREENING FOR DIABETES MELLITUS: Primary | ICD-10-CM

## 2024-12-12 DIAGNOSIS — Z13.6 SCREENING FOR CARDIOVASCULAR CONDITION: ICD-10-CM

## 2024-12-12 DIAGNOSIS — Z00.00 LABORATORY EXAMINATION ORDERED AS PART OF A ROUTINE GENERAL MEDICAL EXAMINATION: ICD-10-CM

## 2024-12-12 NOTE — TELEPHONE ENCOUNTER
1. Screening for diabetes mellitus (Primary)  -     Comp Metabolic Panel (14); Future; Expected date: 12/12/2024  -     Hemoglobin A1C; Future; Expected date: 12/12/2024  2. Screening for iron deficiency anemia  -     CBC With Differential With Platelet; Future; Expected date: 12/12/2024  3. Screening for thyroid disorder  -     TSH W Reflex To Free T4; Future; Expected date: 12/12/2024  4. Screening for cardiovascular condition  -     Lipid Panel; Future; Expected date: 12/12/2024  5. Laboratory examination ordered as part of a routine general medical examination  -     Lipid Panel; Future; Expected date: 12/12/2024  -     CBC With Differential With Platelet; Future; Expected date: 12/12/2024  -     Comp Metabolic Panel (14); Future; Expected date: 12/12/2024  -     TSH W Reflex To Free T4; Future; Expected date: 12/12/2024  -     Hemoglobin A1C; Future; Expected date: 12/12/2024       OK to notify. Thanks, Rob Mohan MD

## 2024-12-12 NOTE — TELEPHONE ENCOUNTER
Patient is scheduled for her physical 1/16/2025 with Dr. Mohan.  Please send orders for labs to [facility].

## 2024-12-16 RX ORDER — PREGABALIN 200 MG/1
200 CAPSULE ORAL 3 TIMES DAILY
Qty: 270 CAPSULE | Refills: 0 | OUTPATIENT
Start: 2024-12-16

## 2024-12-16 NOTE — TELEPHONE ENCOUNTER
Requested Prescriptions     Refused Prescriptions Disp Refills    PREGABALIN 200 MG Oral Cap [Pharmacy Med Name: PREGABALIN 200MG CAPSULES] 270 capsule 0     Sig: TAKE ONE CAPSULE BY MOUTH EVERY MORNING, AT NOON AND AT BEDTIME     Refused By: RENETTA BAH     Reason for Refusal: Duplicate refill request     We are not managing provider. Refilled last week by neurologist

## 2024-12-17 RX ORDER — CLOTRIMAZOLE 10 MG/1
LOZENGE ORAL
Qty: 60 TROCHE | Refills: 0 | OUTPATIENT
Start: 2024-12-17

## 2024-12-17 NOTE — TELEPHONE ENCOUNTER
Requested Prescriptions     Refused Prescriptions Disp Refills    CLOTRIMAZOLE 10 MG Mouth/Throat Michelle [Pharmacy Med Name: CLOTRIMAZOLE 10MG LOZENGE] 60 Michelle 0     Sig: TAKE 1 LOZENGE BY MOUTH FOUR TIMES DAILY     Refused By: RENETTA BAH     Reason for Refusal: Duplicate refill request     Refilled 2 weeks ago-too soon

## 2024-12-18 RX ORDER — CLOTRIMAZOLE 10 MG/1
LOZENGE ORAL
Qty: 60 TROCHE | Refills: 0 | Status: SHIPPED | OUTPATIENT
Start: 2024-12-18

## 2024-12-18 NOTE — TELEPHONE ENCOUNTER
Patient comment: Hi Dr. Mohan - Could I get some more refills for my lozenges. They seem to help best with my burning tongue. Thank you!

## 2024-12-19 ENCOUNTER — TELEPHONE (OUTPATIENT)
Dept: PHYSICAL THERAPY | Facility: HOSPITAL | Age: 49
End: 2024-12-19

## 2024-12-23 ENCOUNTER — TELEPHONE (OUTPATIENT)
Dept: PHYSICAL THERAPY | Facility: HOSPITAL | Age: 49
End: 2024-12-23

## 2024-12-26 ENCOUNTER — TELEPHONE (OUTPATIENT)
Dept: PHYSICAL THERAPY | Facility: HOSPITAL | Age: 49
End: 2024-12-26

## 2024-12-30 ENCOUNTER — OFFICE VISIT (OUTPATIENT)
Dept: PHYSICAL THERAPY | Age: 49
End: 2024-12-30
Attending: FAMILY MEDICINE
Payer: COMMERCIAL

## 2024-12-30 DIAGNOSIS — M77.01 MEDIAL EPICONDYLITIS OF RIGHT ELBOW: Primary | ICD-10-CM

## 2024-12-30 DIAGNOSIS — M77.12 LEFT LATERAL EPICONDYLITIS: ICD-10-CM

## 2024-12-30 PROCEDURE — 97161 PT EVAL LOW COMPLEX 20 MIN: CPT

## 2024-12-30 PROCEDURE — 97110 THERAPEUTIC EXERCISES: CPT

## 2024-12-30 NOTE — PROGRESS NOTES
ELBOW AND HAND EVALUATION:     Diagnosis:   Medial epicondylitis of right elbow (M77.01)  Left lateral epicondylitis (M77.12)      Referring Provider: Nabeel Lim  Date of Evaluation:    12/30/2024    Precautions:  None Next MD visit:   none scheduled  Date of Surgery: n/a     PATIENT SUMMARY   Rita Webb is a 49 year old female who presents to therapy today with complaints of bilateral elbow pain with the L more severe (lateral elbow) and R (medial elbow). Pt states that her pain has been going on for over 10 months now. Pt has had Platelet Rich Plasma (PRP) in both areas on 12/6. Pt reports almost complete relief on the R side but continues to endorse pain on the L.   Pt describes pain level current 4/10, at best 2/10, at worst 6/10.   Current functional limitations include pain onset with any resisted wrist movement on the L specifically lifting a cup to drink and occasional pain on the R with similar tasks.     Rita describes prior level of function independent w/o difficulty.   Hand Dominance: right  Pt goals include return to PLOF symptom free.  Past medical history was reviewed with Rita. Significant findings include  has a past medical history of Anxiety, Anxiety state, Chronic sore throat, Cyst (solitary) of breast, GERD (gastroesophageal reflux disease), History of stomach ulcers, HPV in female, Infertility, female, LPRD (laryngopharyngeal reflux disease), Phlegm in throat, Prediabetes, Screening mammogram for breast cancer (09/06/2024), Skin cancer, Stomach ulcer (05/29/2014), Stress, Stress headaches, and Visual impairment.    She has no past medical history of Anesthesia complication, Difficult intubation, Exposure to medical diagnostic radiation, motion sickness, Malignant hyperthermia, Personal history of antineoplastic chemotherapy, PONV (postoperative nausea and vomiting), or Pseudocholinesterase deficiency.     ASSESSMENT  Rita presents to physical therapy evaluation with primary  c/o bilateral elbow pain with the L (lateral elbow) and R (medial elbow). The results of the objective tests and measures show Trps and soft tissue restrictions bilaterally in her wrist extensor and flexor bundle, and weakness in  strength and postural musculature.  Functional deficits include but are not limited to difficulty and pain onset with repetitive or resisted wrist movements.  Signs and symptoms are consistent with diagnosis of Medial epicondylitis of right elbow and Left lateral epicondylitis . Pt and PT discussed evaluation findings, pathology, POC and HEP.  Pt voiced understanding and performs HEP correctly without reported pain. Skilled Physical Therapy is medically necessary to address the above impairments and reach functional goals.     OBJECTIVE:   Observation/Posture: Mild forward head rounded shoulders posture.  Palpation: Intact and equal bilaterally   Edema: none  Sensation: Intact and equal bilaterally  Cervical Screen: WNL    AROM: (* denotes performed with pain)  Elbow Wrist   Flexion: R WNL; L WNL  Extension: R WNL; L WNL  Supination: R WNL, L WNL  Pronation: R WNL, *L WNL Flexion: R WNL, L WNL  Extension: R WNL, L WNL  Ulnar Deviation: R WNL, L WNL  Radial Deviation R WNL, L WNL       Strength/MMT: (* denotes performed with pain)   Elbow Wrist   Flexion: R 5/5; L 5/5  Extension: R 5/5; L 5/5  Supination: R 4/5; *L 4/5  Pronation: R 4/5; L 4*/5 Flexion: R 5/5; L 5/5  Extension: R 5/5; L 4*/5  Ulnar Deviation: R 5/5; L 5/5  Radial Deviation R 5/5; L 5/5     Strength (lbs) Right Left    : 60 35     Accessory motion: hypomobility of T-spine in motions of extension and rotation bilaterally    Flexibility: Tightness of pec musculature bilaterally, supinator tightness on L more than R    Special tests:   Cozens test: + on L  Rowan: + on L  Resisted pronation caused pain on L  Wrist flexion against resistance: negative on R    Today’s Treatment and Response:   Pt education was provided on  exam findings, treatment diagnosis, treatment plan, expectations, and prognosis. Pt was also provided recommendations for activity modifications, possible soreness after evaluation, modalities as needed [ice/heat], postural corrections, ergonomics, and importance of remaining active.  Patient was instructed in and issued a HEP for: Access Code: [email or web address]  URL: [email or web address]  Date: 12/30/2024  Prepared by: Stephan Scott    Exercises  - Seated Elbow Manual Massage Perpendicular  - 3-4 x weekly  - Standing Wrist Flexion Stretch  - 1 x daily - 5 x weekly - 3 sets - 20-30 hold  - Standing Wrist Extension Stretch  - 1 x daily - 5 x weekly - 3 sets - 20-30 hold  - Seated Isometric Wrist Extension  - 5-6 x weekly - 2-3 sets - 10 reps  - Seated Isometric Wrist Flexion Neutral  - 5-6 x weekly - 2-3 sets - 10 reps    Charges: PT Eval Low Complexity, 23      Total Timed Treatment: 22 min     Total Treatment Time: 45 min       PLAN OF CARE:    Goals: (to be met in 8 visits)   Pt will improve bilaterally  strength to >65 lbs to be able to open a jar without difficulty   Pt will demonstrate improved elbow extensor mm tension to WNL to be able to carry objects at home and work >25 lbs with <1/10 elbow pain   Pt will have WNL wrist extensor mm flexibility to be able to drive without pain   Pt will be independent and compliant with comprehensive HEP to maintain progress achieved in PT    Frequency / Duration: Patient will be seen for 2 x/week or a total of 8 visits over a 90 day period. Treatment will include: Manual Therapy, Neuromuscular Re-education, Self-Care Home Management, Therapeutic Activities, Therapeutic Exercise, Home Exercise Program instruction, and Modalities to include: as needed for pain modulation.    Education or treatment limitation: None  Rehab Potential:good    QuickDASH Outcome Score  Score: (Patient-Rptd) 31.82 % (12/25/2024  7:06 PM)        Patient/Family/Caregiver was advised of  these findings, precautions, and treatment options and has agreed to actively participate in planning and for this course of care.    Thank you for your referral. Please co-sign or sign and return this letter via fax as soon as possible to [phone]. If you have any questions, please contact me at Dept: [phone]    Sincerely,  Electronically signed by therapist: Stephan Scott PT  Physician's certification required: Yes  I certify the need for these services furnished under this plan of treatment and while under my care.    X___________________________________________________ Date____________________    Certification From: 12/30/2024  To:3/30/2025

## 2025-01-03 DIAGNOSIS — M77.01 MEDIAL EPICONDYLITIS OF RIGHT ELBOW: ICD-10-CM

## 2025-01-03 DIAGNOSIS — M77.12 LEFT LATERAL EPICONDYLITIS: ICD-10-CM

## 2025-01-03 RX ORDER — CLOTRIMAZOLE 10 MG/1
LOZENGE ORAL
Qty: 60 TROCHE | Refills: 1 | Status: SHIPPED | OUTPATIENT
Start: 2025-01-03

## 2025-01-03 RX ORDER — TRAMADOL HYDROCHLORIDE 50 MG/1
TABLET ORAL
Qty: 24 TABLET | Refills: 1 | Status: SHIPPED | OUTPATIENT
Start: 2025-01-03

## 2025-01-03 NOTE — TELEPHONE ENCOUNTER
Tramadol 50 mg  DOS: n/a  Last OV: 12/6/24  Last refill date: 12/6/24     #/refills: 24/1  Upcoming appt:   Future Appointments   Date Time Provider Department Center   1/6/2025 10:00 AM Stephan Scott, PT [facility]   1/15/2025 10:45 AM Stephan Scott, PT [facility]   1/16/2025  9:30 AM Tomy Mohan MD [facility]   1/21/2025  2:00 PM [facility]   1/23/2025 11:35 AM Edith Ng DO [facility]

## 2025-01-03 NOTE — TELEPHONE ENCOUNTER
Requested Prescriptions     Pending Prescriptions Disp Refills    CLOTRIMAZOLE 10 MG Mouth/Throat Michelle [Pharmacy Med Name: CLOTRIMAZOLE 10MG LOZENGE] 60 Michelle 0     Sig: TAKE 1 LOZENGE BY MOUTH FOUR TIMES DAILY        Last refill: 12/18/24    Last Appointment: LOV 5/24/2024     Next Appointment: 1/16/2025 Tomy Mohan MD

## 2025-01-06 ENCOUNTER — OFFICE VISIT (OUTPATIENT)
Dept: PHYSICAL THERAPY | Age: 50
End: 2025-01-06
Attending: FAMILY MEDICINE
Payer: COMMERCIAL

## 2025-01-06 PROCEDURE — 97140 MANUAL THERAPY 1/> REGIONS: CPT

## 2025-01-06 PROCEDURE — 97110 THERAPEUTIC EXERCISES: CPT

## 2025-01-06 NOTE — PROGRESS NOTES
Diagnosis:   Medial epicondylitis of right elbow (M77.01)  Left lateral epicondylitis (M77.12)         Referring Provider: Nabeel Lim  Date of Evaluation:    12/30/2024     Precautions:  None Next MD visit:   none scheduled  Date of Surgery: n/a   Insurance Primary/Secondary: [employer] / N/A     # Auth Visits: 8            Subjective: Pt arrives to the clinic with reports of a slight improvement in her lateral elbow pain bilaterally since initial evaluation and HEP completion. Pt has been wearing her counter force brace on the L since that is the area of increased pain. Pt reports occasional discomfort in the R but pain levels are minimal. No other concerns at this time.    Pain: 4/10      Objective: Trps and soft tissue restrictions of her wrist extensor musculature on the L more than R.  Accessory motion: hypomobility of T-spine in motions of extension and rotation bilaterally     Flexibility: Tightness of pec musculature bilaterally, supinator tightness on L more than R     Special tests:   Cozens test: + on L  Rowan: + on L  Resisted pronation caused pain on L  Wrist flexion against resistance: negative on R      Assessment: Pt responded well to skilled therapy session w/o an increase in medial or lateral elbow pain following exercise prescription. Pt was progressed in  strengthening exercises as well as exercises that focused on postural control. Pt was reminded importance of HEP completion and wrist position to maximize overall progress. Pt will continue to benefit from skilled therapy to return to PLOF symptom free.       Goals:   Pt will improve bilaterally  strength to >65 lbs to be able to open a jar without difficulty   Pt will demonstrate improved elbow extensor mm tension to WNL to be able to carry objects at home and work >25 lbs with <1/10 elbow pain   Pt will have WNL wrist extensor mm flexibility to be able to drive without pain   Pt will be independent and compliant with comprehensive HEP  to maintain progress achieved in PT    Plan: Progress per POC, focus on postural musculature and  strength.  Date: 1/6/2025  TX#: 2/ Date:                 TX#: 3/ Date:                 TX#: 4/ Date:                 TX#: 5/ Date:   Tx#: 6/   Wrist extensor strumming on L       Flex bar variations using Yellow bar(twists and rainbows)       Wrist Flexion and extensor  stretch        Standing rows using GTB 3x10       ISO wrist flex and extension 3x10 w/ 3 sec holds       HEP: Access Code: [email or web address]  URL: [email or web address]  Date: 12/30/2024  Prepared by: Stephan Scott     Exercises  - Seated Elbow Manual Massage Perpendicular  - 3-4 x weekly  - Standing Wrist Flexion Stretch  - 1 x daily - 5 x weekly - 3 sets - 20-30 hold  - Standing Wrist Extension Stretch  - 1 x daily - 5 x weekly - 3 sets - 20-30 hold  - Seated Isometric Wrist Extension  - 5-6 x weekly - 2-3 sets - 10 reps  - Seated Isometric Wrist Flexion Neutral  - 5-6 x weekly - 2-3 sets - 10 reps    Charges: Ther Ex 35 min(2) MT(1)10min       Total Timed Treatment: 45 min  Total Treatment Time: 45 min

## 2025-01-10 ENCOUNTER — LAB ENCOUNTER (OUTPATIENT)
Dept: LAB | Age: 50
End: 2025-01-10
Attending: FAMILY MEDICINE
Payer: COMMERCIAL

## 2025-01-10 DIAGNOSIS — Z13.29 SCREENING FOR THYROID DISORDER: ICD-10-CM

## 2025-01-10 DIAGNOSIS — Z00.00 LABORATORY EXAMINATION ORDERED AS PART OF A ROUTINE GENERAL MEDICAL EXAMINATION: ICD-10-CM

## 2025-01-10 DIAGNOSIS — Z13.6 SCREENING FOR CARDIOVASCULAR CONDITION: ICD-10-CM

## 2025-01-10 DIAGNOSIS — Z13.1 SCREENING FOR DIABETES MELLITUS: ICD-10-CM

## 2025-01-10 DIAGNOSIS — Z13.0 SCREENING FOR IRON DEFICIENCY ANEMIA: ICD-10-CM

## 2025-01-10 LAB
ALBUMIN SERPL-MCNC: 4.4 G/DL (ref 3.2–4.8)
ALBUMIN/GLOB SERPL: 1.3 {RATIO} (ref 1–2)
ALP LIVER SERPL-CCNC: 55 U/L
ALT SERPL-CCNC: 19 U/L
ANION GAP SERPL CALC-SCNC: 8 MMOL/L (ref 0–18)
AST SERPL-CCNC: 23 U/L (ref ?–34)
BASOPHILS # BLD AUTO: 0.03 X10(3) UL (ref 0–0.2)
BASOPHILS NFR BLD AUTO: 0.5 %
BILIRUB SERPL-MCNC: 0.8 MG/DL (ref 0.3–1.2)
BUN BLD-MCNC: 13 MG/DL (ref 9–23)
CALCIUM BLD-MCNC: 10 MG/DL (ref 8.7–10.4)
CHLORIDE SERPL-SCNC: 106 MMOL/L (ref 98–112)
CHOLEST SERPL-MCNC: 171 MG/DL (ref ?–200)
CO2 SERPL-SCNC: 27 MMOL/L (ref 21–32)
CREAT BLD-MCNC: 0.87 MG/DL
EGFRCR SERPLBLD CKD-EPI 2021: 81 ML/MIN/1.73M2 (ref 60–?)
EOSINOPHIL # BLD AUTO: 0.02 X10(3) UL (ref 0–0.7)
EOSINOPHIL NFR BLD AUTO: 0.3 %
ERYTHROCYTE [DISTWIDTH] IN BLOOD BY AUTOMATED COUNT: 12.4 %
EST. AVERAGE GLUCOSE BLD GHB EST-MCNC: 94 MG/DL (ref 68–126)
FASTING PATIENT LIPID ANSWER: YES
FASTING STATUS PATIENT QL REPORTED: YES
GLOBULIN PLAS-MCNC: 3.3 G/DL (ref 2–3.5)
GLUCOSE BLD-MCNC: 79 MG/DL (ref 70–99)
HBA1C MFR BLD: 4.9 % (ref ?–5.7)
HCT VFR BLD AUTO: 34.7 %
HDLC SERPL-MCNC: 58 MG/DL (ref 40–59)
HGB BLD-MCNC: 12 G/DL
IMM GRANULOCYTES # BLD AUTO: 0.01 X10(3) UL (ref 0–1)
IMM GRANULOCYTES NFR BLD: 0.2 %
LDLC SERPL CALC-MCNC: 104 MG/DL (ref ?–100)
LYMPHOCYTES # BLD AUTO: 2.9 X10(3) UL (ref 1–4)
LYMPHOCYTES NFR BLD AUTO: 45.3 %
MCH RBC QN AUTO: 31.5 PG (ref 26–34)
MCHC RBC AUTO-ENTMCNC: 34.6 G/DL (ref 31–37)
MCV RBC AUTO: 91.1 FL
MONOCYTES # BLD AUTO: 0.41 X10(3) UL (ref 0.1–1)
MONOCYTES NFR BLD AUTO: 6.4 %
NEUTROPHILS # BLD AUTO: 3.03 X10 (3) UL (ref 1.5–7.7)
NEUTROPHILS # BLD AUTO: 3.03 X10(3) UL (ref 1.5–7.7)
NEUTROPHILS NFR BLD AUTO: 47.3 %
NONHDLC SERPL-MCNC: 113 MG/DL (ref ?–130)
OSMOLALITY SERPL CALC.SUM OF ELEC: 291 MOSM/KG (ref 275–295)
PLATELET # BLD AUTO: 341 10(3)UL (ref 150–450)
POTASSIUM SERPL-SCNC: 4.5 MMOL/L (ref 3.5–5.1)
PROT SERPL-MCNC: 7.7 G/DL (ref 5.7–8.2)
RBC # BLD AUTO: 3.81 X10(6)UL
SODIUM SERPL-SCNC: 141 MMOL/L (ref 136–145)
TRIGL SERPL-MCNC: 41 MG/DL (ref 30–149)
TSI SER-ACNC: 1.17 UIU/ML (ref 0.55–4.78)
VLDLC SERPL CALC-MCNC: 7 MG/DL (ref 0–30)
WBC # BLD AUTO: 6.4 X10(3) UL (ref 4–11)

## 2025-01-10 PROCEDURE — 84443 ASSAY THYROID STIM HORMONE: CPT

## 2025-01-10 PROCEDURE — 85025 COMPLETE CBC W/AUTO DIFF WBC: CPT

## 2025-01-10 PROCEDURE — 83036 HEMOGLOBIN GLYCOSYLATED A1C: CPT

## 2025-01-10 PROCEDURE — 80053 COMPREHEN METABOLIC PANEL: CPT

## 2025-01-10 PROCEDURE — 36415 COLL VENOUS BLD VENIPUNCTURE: CPT

## 2025-01-10 PROCEDURE — 80061 LIPID PANEL: CPT

## 2025-01-14 PROBLEM — Z12.11 SPECIAL SCREENING FOR MALIGNANT NEOPLASM OF COLON: Status: RESOLVED | Noted: 2024-05-10 | Resolved: 2025-01-14

## 2025-01-15 ENCOUNTER — OFFICE VISIT (OUTPATIENT)
Dept: PHYSICAL THERAPY | Age: 50
End: 2025-01-15
Attending: FAMILY MEDICINE
Payer: COMMERCIAL

## 2025-01-15 PROCEDURE — 97110 THERAPEUTIC EXERCISES: CPT

## 2025-01-15 PROCEDURE — 97140 MANUAL THERAPY 1/> REGIONS: CPT

## 2025-01-15 NOTE — PROGRESS NOTES
Diagnosis:   Medial epicondylitis of right elbow (M77.01)  Left lateral epicondylitis (M77.12)         Referring Provider: Nabeel Lim  Date of Evaluation:    12/30/2024     Precautions:  None Next MD visit:   none scheduled  Date of Surgery: n/a   Insurance Primary/Secondary: [employer] / N/A     # Auth Visits: 8            Subjective: Pt arrives to the clinic with reports of a slight improvement in her lateral elbow pain bilaterally since initial evaluation and HEP completion. Pt has been wearing her counter force brace on the L since that is the area of increased pain. Pt reports occasional discomfort in the R but pain levels have been minimal. No other concerns at this time.    Pain: 4/10      Objective: Trps and soft tissue restrictions of her wrist extensor musculature on the L more than R.  Accessory motion: hypomobility of T-spine in motions of extension and rotation bilaterally     Flexibility: Tightness of pec musculature bilaterally, supinator tightness on L more than R     Special tests:   Cozens test: + on L  Rowan: + on L  Resisted pronation caused pain on L  Wrist flexion against resistance: negative on R      Assessment: Pt responded well to skilled therapy session w/o an increase in medial or lateral elbow pain following exercise prescription. Pt was progressed in  strengthening exercises as well as exercises that focused on postural control. Pt was reminded importance of HEP completion and wrist position to maximize overall progress. Pt will continue to benefit from skilled therapy to return to PLOF symptom free.       Goals:   Pt will improve bilaterally  strength to >65 lbs to be able to open a jar without difficulty   Pt will demonstrate improved elbow extensor mm tension to WNL to be able to carry objects at home and work >25 lbs with <1/10 elbow pain   Pt will have WNL wrist extensor mm flexibility to be able to drive without pain   Pt will be independent and compliant with comprehensive  HEP to maintain progress achieved in PT    Plan: Progress per POC, focus on postural musculature and  strength.  Date: 1/6/2025  TX#: 2/ Date:1/15/2025                 TX#: 3/ Date:                 TX#: 4/ Date:                 TX#: 5/ Date:   Tx#: 6/   Wrist extensor strumming on L Wrist extensor strumming on L      Flex bar variations using Yellow bar(twists and rainbows) Flex bar variations using Yellow bar(twists and rainbows)      Wrist Flexion and extensor  stretch  Wrist Flexion and extensor stretch       Standing rows using GTB 3x10 Seated scapular retraction using RTB 3x10      ISO wrist flex and extension 3x10 w/ 3 sec holds Horizontal abduction in seated position using RTB 3x10       ISO wrist flex and extension 3x10 w/ 3 sec holds      HEP: Access Code: [email or web address]  URL: [email or web address]  Date: 12/30/2024  Prepared by: Stephan Scott     Exercises  - Seated Elbow Manual Massage Perpendicular  - 3-4 x weekly  - Standing Wrist Flexion Stretch  - 1 x daily - 5 x weekly - 3 sets - 20-30 hold  - Standing Wrist Extension Stretch  - 1 x daily - 5 x weekly - 3 sets - 20-30 hold  - Seated Isometric Wrist Extension  - 5-6 x weekly - 2-3 sets - 10 reps  - Seated Isometric Wrist Flexion Neutral  - 5-6 x weekly - 2-3 sets - 10 reps    Charges: Ther Ex 35 min(2) MT(1)10min       Total Timed Treatment: 43 min  Total Treatment Time: 45 min

## 2025-01-15 NOTE — ASSESSMENT & PLAN NOTE
.Clinical Course: stable   Good control    Antidepressant Meds: buPROPion ER Tb24 - 150 MG; desvenlafaxine ER Tb24 - 100 MG  Anxiolytic Meds: ALPRAZolam ER Tb24 - 2 MG

## 2025-01-15 NOTE — ASSESSMENT & PLAN NOTE
1/10/2025: HgbA1C 4.9; Glucose 79 Sugar control is stable  Continue with current treatment plan  Pre-Diabetic. Not currently on Diabetic meds.

## 2025-01-15 NOTE — ASSESSMENT & PLAN NOTE
ADHD shows Good control.  Weight trend: has been stable  Stimulant Meds: amphetamine-dextroamphetamine ER Cp24 - 30 MG; amphetamine-dextroamphetamine Tabs - 30 MG  Non-Stimulant Meds: buPROPion ER Tb24 - 150 MG stable  Continue with current treatment plan

## 2025-01-15 NOTE — ASSESSMENT & PLAN NOTE
Cholesterol shows Good control. Long term heart-healthy diet and lifestyle discussed and encouraged to reduce risk of cardiovascular disease.  1/10/2025: Cholesterol, Total 171; HDL Cholesterol 58; Triglycerides 41; LDL Cholesterol 104 (H)  No current Cholesterol medication. stable  Continue with current treatment plan

## 2025-01-16 ENCOUNTER — OFFICE VISIT (OUTPATIENT)
Dept: FAMILY MEDICINE CLINIC | Facility: CLINIC | Age: 50
End: 2025-01-16
Payer: COMMERCIAL

## 2025-01-16 VITALS
HEART RATE: 74 BPM | WEIGHT: 137.19 LBS | BODY MASS INDEX: 22.86 KG/M2 | HEIGHT: 65 IN | RESPIRATION RATE: 12 BRPM | SYSTOLIC BLOOD PRESSURE: 124 MMHG | DIASTOLIC BLOOD PRESSURE: 72 MMHG

## 2025-01-16 DIAGNOSIS — F41.1 GAD (GENERALIZED ANXIETY DISORDER): ICD-10-CM

## 2025-01-16 DIAGNOSIS — E78.6 LOW HDL (UNDER 40): ICD-10-CM

## 2025-01-16 DIAGNOSIS — R73.03 PREDIABETES: ICD-10-CM

## 2025-01-16 DIAGNOSIS — Z00.00 ANNUAL PHYSICAL EXAM: Primary | ICD-10-CM

## 2025-01-16 DIAGNOSIS — K14.6 TONGUE PAIN: ICD-10-CM

## 2025-01-16 DIAGNOSIS — F90.2 ATTENTION DEFICIT HYPERACTIVITY DISORDER (ADHD), COMBINED TYPE: ICD-10-CM

## 2025-01-16 DIAGNOSIS — M79.7 FIBROMYALGIA: ICD-10-CM

## 2025-01-16 PROCEDURE — 99396 PREV VISIT EST AGE 40-64: CPT | Performed by: FAMILY MEDICINE

## 2025-01-16 RX ORDER — PREGABALIN 200 MG/1
200 CAPSULE ORAL 3 TIMES DAILY
Qty: 270 CAPSULE | Refills: 1 | Status: SHIPPED | OUTPATIENT
Start: 2025-01-16

## 2025-01-16 RX ORDER — CLOTRIMAZOLE 10 MG/1
LOZENGE ORAL
Qty: 140 TROCHE | Refills: 11 | Status: SHIPPED | OUTPATIENT
Start: 2025-01-16

## 2025-01-16 NOTE — ASSESSMENT & PLAN NOTE
Orders:    Pregabalin; Take 1 capsule (200 mg total) by mouth 3 (three) times daily.  Dispense: 270 capsule; Refill: 1

## 2025-01-16 NOTE — PROGRESS NOTES
Rita Webb is a 49 year old female who presents for a complete physical exam.     had concerns including Physical (WWE, no pap, see's gyne ).   Her last annual assessment has been over 1 year: Annual Physical due on 09/21/2024       Subjective:     Symptoms: periods are regular. Patient complains of still with tongue pain, has 2/26 [facility] and wants to stream . Seeing Dr Chaney for medial condylitis.  Tobacco:  She has never smoked tobacco.       Wt Readings from Last 4 Encounters:   01/16/25 137 lb 3.2 oz (62.2 kg)   12/06/24 135 lb (61.2 kg)   11/18/24 135 lb (61.2 kg)   11/07/24 135 lb (61.2 kg)     Body mass index is 22.83 kg/m².     The 10-year ASCVD risk score (Sergei HUNTER, et al., 2019) is: 0.9%    Values used to calculate the score:      Age: 49 years      Sex: Female      Is Non- : No      Diabetic: No      Tobacco smoker: No      Systolic Blood Pressure: 124 mmHg      Is BP treated: No      HDL Cholesterol: 58 mg/dL      Total Cholesterol: 171 mg/dL    Chief Complaint Reviewed and Verified  Nursing Notes Reviewed and   Verified  Tobacco Reviewed  Allergies Reviewed  Medications Reviewed    Problem List Reviewed  Medical History Reviewed  Surgical History   Reviewed  OB Status Reviewed  Family History Reviewed          Her family history includes Breast Cancer in her paternal grandmother; Breast Cancer (age of onset: 46) in her paternal cousin female; Cancer in her maternal grandfather and paternal grandmother; Heart Disease in her father; No Known Problems in her brother, mother, sister, and son; Prostate Cancer in her maternal grandfather; osteoporosis in her maternal grandmother.   She  reports that she has never smoked. She has never been exposed to tobacco smoke. She has never used smokeless tobacco. She reports that she does not drink alcohol and does not use drugs.    Exercise: three times per week.  Diet: watches fats closely and watches sugar  closely    GYNE HISTORY: Menstrual History:  OB History    Para Term  AB Living   1 0 0 0 0 1   SAB IAB Ectopic Multiple Live Births   0 0 0 0 1      Menarche age:    Patient's last menstrual period was 2024 (exact date).       Health Maintenance Due   Topic Date Due    DTaP,Tdap,and Td Vaccines (2 - Td or Tdap) 2020    COVID-19 Vaccine (4 -  season) 2024    Annual Physical  2024    Influenza Vaccine (1) 10/01/2024    Pneumococcal Vaccine: 50+ Years (1 of 1 - PCV) Never done    Zoster Vaccines (1 of 2) Never done    Annual Depression Screening  2025         Review of Systems   Constitutional: Negative.  Negative for activity change, appetite change, chills and fever.   HENT: Negative.     Eyes: Negative.    Respiratory: Negative.  Negative for shortness of breath.    Cardiovascular: Negative.  Negative for chest pain and palpitations.   Gastrointestinal: Negative.  Negative for abdominal pain.   Genitourinary: Negative.  Negative for dysuria.   Musculoskeletal:  Negative for arthralgias.   Skin: Negative.  Negative for rash.   Allergic/Immunologic: Negative.    Neurological: Negative.         Results:    Lab Results   Component Value Date/Time    WBC 6.4 01/10/2025 02:31 PM    HGB 12.0 01/10/2025 02:31 PM    .0 01/10/2025 02:31 PM      Lab Results   Component Value Date/Time    GLU 79 01/10/2025 02:31 PM     01/10/2025 02:31 PM    K 4.5 01/10/2025 02:31 PM     01/10/2025 02:31 PM    CO2 27.0 01/10/2025 02:31 PM    CREATSERUM 0.87 01/10/2025 02:31 PM    CA 10.0 01/10/2025 02:31 PM    ALB 4.4 01/10/2025 02:31 PM    TP 7.7 01/10/2025 02:31 PM    ALKPHO 55 01/10/2025 02:31 PM    AST 23 01/10/2025 02:31 PM    ALT 19 01/10/2025 02:31 PM    BILT 0.8 01/10/2025 02:31 PM    TSH 1.166 01/10/2025 02:31 PM        Lab Results   Component Value Date/Time    CHOLEST 171 01/10/2025 02:31 PM    HDL 58 01/10/2025 02:31 PM    TRIG 41 01/10/2025 02:31 PM      (H) 01/10/2025 02:31 PM    NONHDLC 113 01/10/2025 02:31 PM       Last A1c value was 4.9% done 1/10/2025.     12/26/2023: Vitamin D, 25OH, Total 32.4       Objective:    EXAM:  /72   Pulse 74   Resp 12   Ht 5' 5\" (1.651 m)   Wt 137 lb 3.2 oz (62.2 kg)   LMP 07/12/2024 (Exact Date)   BMI 22.83 kg/m²  Estimated body mass index is 22.83 kg/m² as calculated from the following:    Height as of this encounter: 5' 5\" (1.651 m).    Weight as of this encounter: 137 lb 3.2 oz (62.2 kg).   Physical Exam  Vitals and nursing note reviewed.   Constitutional:       General: She is not in acute distress.     Appearance: Normal appearance.   HENT:      Head: Normocephalic and atraumatic.      Right Ear: Tympanic membrane and external ear normal.      Left Ear: Tympanic membrane and external ear normal.      Nose: Nose normal.      Mouth/Throat:      Mouth: Mucous membranes are moist.   Eyes:      Extraocular Movements: Extraocular movements intact.      Pupils: Pupils are equal, round, and reactive to light.   Cardiovascular:      Rate and Rhythm: Normal rate and regular rhythm.      Pulses: Normal pulses.           Carotid pulses are 2+ on the right side and 2+ on the left side.       Radial pulses are 2+ on the right side and 2+ on the left side.        Dorsalis pedis pulses are 2+ on the right side and 2+ on the left side.        Posterior tibial pulses are 2+ on the right side and 2+ on the left side.      Heart sounds: Normal heart sounds, S1 normal and S2 normal. No murmur heard.  Pulmonary:      Effort: Pulmonary effort is normal.      Breath sounds: Normal breath sounds.   Abdominal:      General: Abdomen is flat. Bowel sounds are normal. There is no distension.      Palpations: Abdomen is soft.   Musculoskeletal:         General: Normal range of motion.      Cervical back: Normal range of motion and neck supple.      Right lower leg: No edema.      Left lower leg: No edema.   Skin:     General: Skin is warm and  dry.      Capillary Refill: Capillary refill takes less than 2 seconds.   Neurological:      General: No focal deficit present.      Mental Status: She is alert and oriented to person, place, and time.   Psychiatric:         Mood and Affect: Mood normal.         Behavior: Behavior normal.         Thought Content: Thought content normal.          Assessment & Plan:    Rita Webb is a 49 year old female who presents for a complete physical exam.   Pt's weight is Body mass index is 22.83 kg/m²., recommended low fat diet and aerobic exercise 30 minutes three times weekly.   Health maintenance, Up to date    Immunizations: Up to date   Immunization History   Administered Date(s) Administered    Covid-19 Vaccine Pfizer 30 mcg/0.3 ml 04/09/2021, 05/04/2021, 12/11/2021    Fluvirin, 3 Years & >, Im 10/30/2007    Influenza 01/23/2015    TDAP 05/18/2010         Pt info given for: exercise, low fat diet, The patient indicates understanding of these issues and agrees to the plan.  The patient is asked to return for CPX in 1 years.    Assessment & Plan  Annual physical exam         Low HDL (under 40)  Cholesterol shows Good control. Long term heart-healthy diet and lifestyle discussed and encouraged to reduce risk of cardiovascular disease.  1/10/2025: Cholesterol, Total 171; HDL Cholesterol 58; Triglycerides 41; LDL Cholesterol 104 (H)  No current Cholesterol medication. stable  Continue with current treatment plan          Prediabetes  1/10/2025: HgbA1C 4.9; Glucose 79 Sugar control is stable  Continue with current treatment plan  Pre-Diabetic. Not currently on Diabetic meds.          Attention deficit hyperactivity disorder (ADHD), combined type  ADHD shows Good control.  Weight trend: has been stable  Stimulant Meds: amphetamine-dextroamphetamine ER Cp24 - 30 MG; amphetamine-dextroamphetamine Tabs - 30 MG  Non-Stimulant Meds: buPROPion ER Tb24 - 150 MG stable  Continue with current treatment plan          ANDRESSA  (generalized anxiety disorder)  .Clinical Course: stable   Good control    Antidepressant Meds: buPROPion ER Tb24 - 150 MG; desvenlafaxine ER Tb24 - 100 MG  Anxiolytic Meds: ALPRAZolam ER Tb24 - 2 MG          Tongue pain  Refill meds and seeing [facility]. Continue to monitor and continue present management   Orders:    Clotrimazole; TAKE 1 LOZENGE BY MOUTH FOUR TIMES DAILY  Dispense: 140 Michelle; Refill: 11    Fibromyalgia    Orders:    Pregabalin; Take 1 capsule (200 mg total) by mouth 3 (three) times daily.  Dispense: 270 capsule; Refill: 1       I have discontinued Rita KRISTINA Webb's nortriptyline and pregabalin. I have also changed her pregabalin. Additionally, I am having her start on diclofenac. Lastly, I am having her maintain her ALPRAZolam ER, buPROPion ER, desvenlafaxine ER, amphetamine-dextroamphetamine ER, amphetamine-dextroamphetamine, PreviDent 5000 Dry Mouth, valACYclovir, traMADol, and clotrimazole.     Return in about 1 year (around 1/16/2026) for Annual physical.

## 2025-01-21 ENCOUNTER — OFFICE VISIT (OUTPATIENT)
Dept: PHYSICAL THERAPY | Age: 50
End: 2025-01-21
Attending: FAMILY MEDICINE
Payer: COMMERCIAL

## 2025-01-21 PROCEDURE — 97110 THERAPEUTIC EXERCISES: CPT

## 2025-01-21 PROCEDURE — 97140 MANUAL THERAPY 1/> REGIONS: CPT

## 2025-01-21 NOTE — PROGRESS NOTES
Diagnosis:   Medial epicondylitis of right elbow (M77.01)  Left lateral epicondylitis (M77.12)         Referring Provider: Nabeel Lim  Date of Evaluation:    12/30/2024     Precautions:  None Next MD visit:   none scheduled  Date of Surgery: n/a   Insurance Primary/Secondary: [employer] / N/A     # Auth Visits: 8            Subjective: Pt arrives to the clinic with reports of an overall improvement in her lateral elbow pain on the R(completely resolved) L(improving) since initial evaluation and HEP completion. No other concerns at this time.    Pain: 2/10      Objective: Trps and soft tissue restrictions of her wrist extensor musculature on the L more than R.  Accessory motion: hypomobility of T-spine in motions of extension and rotation bilaterally     Flexibility: Tightness of pec musculature bilaterally, supinator tightness on L more than R     Special tests:   Cozens test: + on L  Rowan: + on L  Resisted pronation caused pain on L  Wrist flexion against resistance: negative on R      Assessment: Pt responded well to skilled therapy session w/o an increase in medial or lateral elbow pain following exercise prescription. Pt was progressed in  strengthening exercises as well as exercises that focused on postural control. Pt was reminded importance of HEP completion and wrist position to maximize overall progress. Pt will continue to benefit from skilled therapy to return to PLOF symptom free.       Goals:   Pt will improve bilaterally  strength to >65 lbs to be able to open a jar without difficulty   Pt will demonstrate improved elbow extensor mm tension to WNL to be able to carry objects at home and work >25 lbs with <1/10 elbow pain   Pt will have WNL wrist extensor mm flexibility to be able to drive without pain   Pt will be independent and compliant with comprehensive HEP to maintain progress achieved in PT    Plan: Progress per POC, focus on postural musculature and  strength.  Date: 1/6/2025  TX#:  2/ Date:1/15/2025                 TX#: 3/ Date:1/21/2025                 TX#: 4/ Date:                 TX#: 5/ Date:   Tx#: 6/   Wrist extensor strumming on L Wrist extensor strumming on L Wrist extensor strumming on L     Flex bar variations using Yellow bar(twists and rainbows) Flex bar variations using Yellow bar(twists and rainbows) Flex bar variations using Yellow bar(twists and rainbows)     Wrist Flexion and extensor  stretch  Wrist Flexion and extensor stretch  Wrist Flexion and extensor stretch      Standing rows using GTB 3x10 Seated scapular retraction using RTB 3x10 Seated scapular retraction using GTB 3x10     ISO wrist flex and extension 3x10 w/ 3 sec holds Horizontal abduction in seated position using RTB 3x10 ISO wrist extension using 4 lbs DB 2x10 w/ 3 sec holds      ISO wrist flex and extension 3x10 w/ 3 sec holds Standing rows using black TB 3x10       Horizontal abduction in supine using RTB 3x10       Open book stretch 1x12 per side     HEP: Access Code: [email or web address]  URL: [email or web address]  Date: 12/30/2024  Prepared by: Stephan Scott     Exercises  - Seated Elbow Manual Massage Perpendicular  - 3-4 x weekly  - Standing Wrist Flexion Stretch  - 1 x daily - 5 x weekly - 3 sets - 20-30 hold  - Standing Wrist Extension Stretch  - 1 x daily - 5 x weekly - 3 sets - 20-30 hold  - Seated Isometric Wrist Extension  - 5-6 x weekly - 2-3 sets - 10 reps  - Seated Isometric Wrist Flexion Neutral  - 5-6 x weekly - 2-3 sets - 10 reps    Charges: Ther Ex 35 min(2) MT(1)10min   HP(0)    Total Timed Treatment: 45 min  Total Treatment Time: 45 min

## 2025-01-23 ENCOUNTER — OFFICE VISIT (OUTPATIENT)
Dept: NEUROLOGY | Facility: CLINIC | Age: 50
End: 2025-01-23
Payer: COMMERCIAL

## 2025-01-23 VITALS
WEIGHT: 138 LBS | BODY MASS INDEX: 22.99 KG/M2 | HEART RATE: 82 BPM | DIASTOLIC BLOOD PRESSURE: 60 MMHG | RESPIRATION RATE: 16 BRPM | SYSTOLIC BLOOD PRESSURE: 80 MMHG | OXYGEN SATURATION: 97 % | HEIGHT: 65 IN

## 2025-01-23 DIAGNOSIS — F32.0 CURRENT MILD EPISODE OF MAJOR DEPRESSIVE DISORDER, UNSPECIFIED WHETHER RECURRENT: ICD-10-CM

## 2025-01-23 DIAGNOSIS — K14.6 BURNING MOUTH SYNDROME: Primary | ICD-10-CM

## 2025-01-23 PROCEDURE — 99213 OFFICE O/P EST LOW 20 MIN: CPT | Performed by: OTHER

## 2025-01-23 NOTE — PATIENT INSTRUCTIONS
Refill policies:    Allow 2-3 business days for refills; controlled substances may take longer.  Contact your pharmacy at least 5 days prior to running out of medication and have them send an electronic request or submit request through the “request refill” option in your [employer] account.  Refills are not addressed on weekends; covering physicians do not authorize routine medications on weekends.  No narcotics or controlled substances are refilled after noon on Fridays or by on call physicians.  By law, narcotics must be electronically prescribed.  A 30 day supply with no refills is the maximum allowed.  If your prescription is due for a refill, you may be due for a follow up appointment.  To best provide you care, patients receiving routine medications need to be seen at least once a year.  Patients receiving narcotic/controlled substance medications need to be seen at least once every 3 months.  In the event that your preferred pharmacy does not have the requested medication in stock (e.g. Backordered), it is your responsibility to find another pharmacy that has the requested medication available.  We will gladly send a new prescription to that pharmacy at your request.    Scheduling Tests:    If your physician has ordered radiology tests such as MRI or CT scans, please contact Central Scheduling at [phone] right away to schedule the test.  Once scheduled, the [facility] Centralized Referral Team will work with your insurance carrier to obtain pre-certification or prior authorization.  Depending on your insurance carrier, approval may take 3-10 days.  It is highly recommended patients assure they have received an authorization before having a test performed.  If test is done without insurance authorization, patient may be responsible for the entire amount billed.      Precertification and Prior Authorizations:  If your physician has recommended that you have a procedure or additional testing performed the [facility]  Centralized Referral Team will contact your insurance carrier to obtain pre-certification or prior authorization.    You are strongly encouraged to contact your insurance carrier to verify that your procedure/test has been approved and is a COVERED benefit.  Although the [facility] Centralized Referral Team does its due diligence, the insurance carrier gives the disclaimer that \"Although the procedure is authorized, this does not guarantee payment.\"    Ultimately the patient is responsible for payment.   Thank you for your understanding in this matter.  Paperwork Completion:  If you require FMLA or disability paperwork for your recovery, please make sure to either drop it off or have it faxed to our office at [phone]. Be sure the form has your name and date of birth on it.  The form will be faxed to our Forms Department and they will complete it for you.  There is a 25$ fee for all forms that need to be filled out.  Please be aware there is a 10-14 day turnaround time.  You will need to sign a release of information (SU) form if your paperwork does not come with one.  You may call the Forms Department with any questions at [phone].  Their fax number is [phone].        Ask psychiatrist if they would be ok with switching desvenlafaxine 100mg to cymbalta, and if yes, how to go about the switch (overlap or stop one and start another) and what dose of cymbalta

## 2025-01-23 NOTE — PROGRESS NOTES
[facility] - [place]   Neurology    Rita Webb Patient Status:  No patient class for patient encounter    1975 MRN [id number]   Location [facility] PCP Tomy Mohan MD              HPI:   Rita Webb is a(n) 49 year old female who presents at the request of Tomy Mohan MD for evaluation of burning in the tongue. It started about a year ago, entire mid to anterior tongue, as well as sides of the tongue. It is a burning sensation, without numbness. Denies symptoms in the arms or legs. She has rheumatologic work up, which came back negative, including Sjogrens. Also saw ENT. She was started on gabapentin, which did not help. Was given clotrimazole lozenges, which seemed to numb the tongue. She started Lyrica 100mg TID, which helps her symptoms.   Interim  Reports still has burning on the top of the tongue, and Lyrica 200mg TID, but 4-5 hours later seems to wear off. Reports history of dry mouth, but doesn't think has it anymore. Drinks about 45 oz.  Interim  Reports Lyrica 200mg TID works 75% of the time. When it is her time to get a dose, the tongue starts burning again. Middle and top of her tongue. Nortriptyline caused her dry mouth to worsen.       Pertinent imaging and laboratory work-up:   Component      Latest Ref Rng 3/4/2024   Vitamin B12      193 - 986 pg/mL 411    ZINC      44 - 115 ug/dL 57    Iron, Serum      50 - 170 ug/dL 72    VITAMIN B1 (THIAMINE), WHOLE B      66.5 - 200.0 nmol/L 133.9        EMG   Conclusion:  1.  There is no electrophysiologic evidence of a median mononeuropathy, or of any other mononeuropathies, on this study.  2.  There is no electrographic evidence of a cervical radiculopathy.    Component      Latest Ref Rng 2023   PROTEIN, TOTAL      5.7 - 8.2 g/dL  7.0    Albumin      3.75 - 5.21 g/dL  4.14    ALPHA-1-GLOBULINS      0.19 - 0.46 g/dL  0.27    ALPHA-2-GLOBULINS       0.48 - 1.05 g/dL  0.68    BETA GLOBULINS      0.68 - 1.23 g/dL  0.81    GAMMA GLOBULINS      0.62 - 1.70 g/dL  1.10    ALBUMIN/GLOBULIN RATIO      1.00 - 2.00   1.45    SPE INTERPRETATION  No apparent monoclonal protein on serum electrophoresis.…    KAPPA FREE LIGHT CHAIN      0.330 - 1.940 mg/dL  2.089 (H)    LAMBDA FREE LIGHT CHAIN      0.571 - 2.630 mg/dL  1.578    KAPPA/LAMBDA FLC RATIO      0.26 - 1.65   1.32    HEMOGLOBIN A1c      <5.7 % 5.5     ESTIMATED AVERAGE GLUCOSE      68 - 126 mg/dL 111     TSH      0.358 - 3.740 mIU/mL 1.730     Vitamin B12      193 - 986 pg/mL 699  332    FOLATE (FOLIC ACID), SERUM      >=8.7 ng/mL 15.1     VITAMIN B6      3.4 - 65.2 ug/L  39.9    Anti-SSA Antibody, IGG      <7 U/mL  <0.4    Anti-SSB Antibody, IGG      <7 U/mL  <0.4         Past Medical History:  Past Medical History:    Anxiety    Anxiety state    Chronic sore throat    Cyst (solitary) of breast    fibrocystic breast with many cysts    GERD (gastroesophageal reflux disease)    maybe silent reflux    History of stomach ulcers    HPV in female    Infertility, female    had in vitro    LPRD (laryngopharyngeal reflux disease)    Phlegm in throat    constant    Prediabetes    Screening mammogram for breast cancer    There is no mammographic evidence of malignancy.   RECOMMENDATION: A 1 year screening mammogram is recommended.  Screening Breast MRI is recommended at 6 month intervals with annual screening mammography.    Skin cancer    on back BCC    Stomach ulcer    Stress    Stress headaches    Visual impairment    contacts        Past Surgical History:  Past Surgical History:   Procedure Laterality Date      2008    Colonoscopy  2024    Upper gi endoscopy - referral  2013    stomach ulcers       Family History:  family history includes Breast Cancer in her paternal grandmother; Breast Cancer (age of onset: 46) in her paternal cousin female; Cancer in her maternal grandfather and paternal  grandmother; Heart Disease in her father; No Known Problems in her brother, mother, sister, and son; Prostate Cancer in her maternal grandfather; osteoporosis in her maternal grandmother.      Social History:   reports that she has never smoked. She has never been exposed to tobacco smoke. She has never used smokeless tobacco. She reports that she does not drink alcohol and does not use drugs.    Allergies:  No Known Allergies    MEDICATIONS:    Current Outpatient Medications:     clotrimazole 10 MG Mouth/Throat Michelle, TAKE 1 LOZENGE BY MOUTH FOUR TIMES DAILY, Disp: 140 Michelle, Rfl: 11    pregabalin 200 MG Oral Cap, Take 1 capsule (200 mg total) by mouth 3 (three) times daily., Disp: 270 capsule, Rfl: 1    diclofenac 1 % External Gel, Apply 4 g topically 4 (four) times daily as needed (pain)., Disp: 100 g, Rfl: 0    valACYclovir 1 G Oral Tab, Take 2 tablets (2,000 mg total) by mouth every 12 (twelve) hours. 2 doses per episode, Disp: 16 tablet, Rfl: 1    PREVIDENT 5000 DRY MOUTH 1.1 % Dental Gel, , Disp: , Rfl:     amphetamine-dextroamphetamine ER 30 MG Oral Capsule SR 24 Hr, Take 1 capsule (30 mg total) by mouth every morning., Disp: , Rfl:     amphetamine-dextroamphetamine 30 MG Oral Tab, Take 1 tablet (30 mg total) by mouth 2 (two) times daily., Disp: , Rfl:     desvenlafaxine  MG Oral Tablet 24 Hr, , Disp: , Rfl:     ALPRAZolam ER 2 MG Oral Tablet 24 Hr, Take 1 tablet (2 mg total) by mouth daily., Disp: , Rfl: 1    buPROPion HCl ER, XL, 150 MG Oral Tablet 24 Hr, Take 1 tablet (150 mg total) by mouth daily., Disp: , Rfl:     traMADol 50 MG Oral Tab, Please take 1 to 2 tablets daily as needed for pain. (Patient not taking: Reported on 1/23/2025), Disp: 24 tablet, Rfl: 1      Review of Systems:   A comprehensive 10 point review of systems was completed.     Pertinent positives and negatives noted in the HPI.         PHYSICAL EXAM:   Neurologic Exam  Vitals  Vitals:    01/23/25 1136   BP: (!) 80/60   Pulse:  82   Resp: 16     General Appearance: Patient is a 49 year old female in no acute distress  Cardiac: Normal rate & regular rhythm  Skin: There are no rashes or other skin lesions.  Musculoskeletal: There is no scoliosis, or joint deformities  Neurologic examination:  Mental status: Patient is alert, attentive, and oriented x 3. Language is coherent and fluent without aphasia. Memory, comprehension and ability to follow commands were intact.   Cranial nerves II-XII: Optic discs were sharp. Pupils were round and reacted to light. Extraocular movements were full. There was no face, jaw, palate or tongue weakness or atrophy. Facial sensation was normal. Hearing was grossly intact. Shoulder shrug was normal.   Motor exam revealed normal muscle bulk and tone. No atrophy or fasciculations. Manual muscle testing revealed MRC grade 5/5 strength throughout including proximal and distal muscles of the arms and legs.  Deep tendon reflexes were 2 at the biceps, brachioradialis, triceps, knee jerk, and ankle jerk. Plantar responses were flexor bilaterally.   Sensory exam revealed normal light touch perception. Vibratory perception and proprioception were intact at the toes. Pinprick and temperature were normal. Romberg sign was absent.  Complex motor skills revealed normal coordination. Finger-nose-finger intact.   Gait was narrow and stable, was able to walk on heels, toes and tandem without any difficulty.     ASSESSMENT/ACTIVE PROBLEM LIST:     Encounter Diagnoses   Name Primary?    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 No signs of other general neuropathic process  EMG and lab work up negative  Nortriptyline caused dry mouth to worsen  Ask psychiatrist if they would be ok with switching desvenlafaxine 100mg to cymbalta, and  if yes, how to go about the switch, and what dose of cymbalta  Continue Lyrica 200mg TID, 9a, 2p, and 7pm  Rheum/sjogrens, nutritional work up negative  Avoid using tobacco, limit acidic or spicy foods, avoid drinking carbonated beverages. Stress can exacerbate symptoms.  Consider another ENT evaluation for dry mouth  Agree with appointment at [facility]    Requested Prescriptions      No prescriptions requested or ordered in this encounter          We discussed in depth regarding the diagnosis, prognosis, treatment. The patient was given ample opportunity to ask questions. All questions and concerns were addressed.     Debra Ng, DO  Neuromuscular and General Neurology  [facility]

## 2025-01-24 ENCOUNTER — APPOINTMENT (OUTPATIENT)
Dept: PHYSICAL THERAPY | Age: 50
End: 2025-01-24
Attending: FAMILY MEDICINE
Payer: COMMERCIAL

## 2025-01-27 ENCOUNTER — OFFICE VISIT (OUTPATIENT)
Dept: PHYSICAL THERAPY | Age: 50
End: 2025-01-27
Attending: FAMILY MEDICINE
Payer: COMMERCIAL

## 2025-01-27 PROCEDURE — 97110 THERAPEUTIC EXERCISES: CPT

## 2025-01-27 PROCEDURE — 97140 MANUAL THERAPY 1/> REGIONS: CPT

## 2025-01-27 NOTE — PROGRESS NOTES
Diagnosis:   Medial epicondylitis of right elbow (M77.01)  Left lateral epicondylitis (M77.12)         Referring Provider: Nabeel Lim  Date of Evaluation:    12/30/2024     Precautions:  None Next MD visit:   none scheduled  Date of Surgery: n/a   Insurance Primary/Secondary: [employer] / N/A     # Auth Visits: 8            Subjective: Pt arrives to the clinic with reports of an overall improvement in her lateral elbow pain on the R(completely resolved) L(improving) since initial evaluation and HEP completion. Pt admits she had a busy weekend and was unable to complete her HEP which could explain a slight increase in L sided elbow soreness.  No other concerns at this time.    Pain: 3/10      Objective: Trps and soft tissue restrictions of her wrist extensor musculature on the L more than R.  Accessory motion: hypomobility of T-spine in motions of extension and rotation bilaterally     Flexibility: Tightness of pec musculature bilaterally, supinator tightness on L more than R     Special tests:   Cozens test: + on L  Rowan: + on L  Resisted pronation caused pain on L  Wrist flexion against resistance: negative on R      Assessment: Pt responded well to skilled therapy session w/o an increase in medial or lateral elbow pain following exercise prescription. Pt was progressed in  strengthening exercises as well as exercises that focused on postural control. Pt was reminded importance of HEP completion and wrist position to maximize overall progress. Pt will continue to benefit from skilled therapy to return to PLOF symptom free.       Goals:   Pt will improve bilaterally  strength to >65 lbs to be able to open a jar without difficulty   Pt will demonstrate improved elbow extensor mm tension to WNL to be able to carry objects at home and work >25 lbs with <1/10 elbow pain   Pt will have WNL wrist extensor mm flexibility to be able to drive without pain   Pt will be independent and compliant with comprehensive HEP  to maintain progress achieved in PT    Plan: Progress per POC, focus on postural musculature and  strength.  Date: 1/6/2025  TX#: 2/ Date:1/15/2025                 TX#: 3/ Date:1/21/2025                 TX#: 4/ Date:1/27/2025                 TX#: 5/ Date:   Tx#: 6/   Wrist extensor strumming on L Wrist extensor strumming on L Wrist extensor strumming on L UBE forward/backward 4 min lvl 1    Flex bar variations using Yellow bar(twists and rainbows) Flex bar variations using Yellow bar(twists and rainbows) Flex bar variations using Yellow bar(twists and rainbows) Standing rows using black TB 3x10    Wrist Flexion and extensor  stretch  Wrist Flexion and extensor stretch  Wrist Flexion and extensor stretch  Shoulder ER pullout using RTB 2x12 per side    Standing rows using GTB 3x10 Seated scapular retraction using RTB 3x10 Seated scapular retraction using GTB 3x10 Wrist extensor strumming on L    ISO wrist flex and extension 3x10 w/ 3 sec holds Horizontal abduction in seated position using RTB 3x10 ISO wrist extension using 4 lbs DB 2x10 w/ 3 sec holds Wrist Flexion and extensor stretch      ISO wrist flex and extension 3x10 w/ 3 sec holds Standing rows using black TB 3x10 Seated scapular retraction using GTB 3x10      Horizontal abduction in supine using RTB 3x10 Flex bar variations using Yellow bar(twists and rainbows)      Open book stretch 1x12 per side     HEP: Access Code: [email or web address]  URL: [email or web address]  Date: 12/30/2024  Prepared by: Stephan Scott     Exercises  - Seated Elbow Manual Massage Perpendicular  - 3-4 x weekly  - Standing Wrist Flexion Stretch  - 1 x daily - 5 x weekly - 3 sets - 20-30 hold  - Standing Wrist Extension Stretch  - 1 x daily - 5 x weekly - 3 sets - 20-30 hold  - Seated Isometric Wrist Extension  - 5-6 x weekly - 2-3 sets - 10 reps  - Seated Isometric Wrist Flexion Neutral  - 5-6 x weekly - 2-3 sets - 10 reps    Charges: Ther Ex 35 min(2) MT(1)10min   HP(0)     Total Timed Treatment: 45 min  Total Treatment Time: 45 min

## 2025-01-31 ENCOUNTER — OFFICE VISIT (OUTPATIENT)
Dept: PHYSICAL THERAPY | Age: 50
End: 2025-01-31
Attending: FAMILY MEDICINE
Payer: COMMERCIAL

## 2025-01-31 PROCEDURE — 97110 THERAPEUTIC EXERCISES: CPT

## 2025-01-31 PROCEDURE — 97140 MANUAL THERAPY 1/> REGIONS: CPT

## 2025-01-31 NOTE — PROGRESS NOTES
Diagnosis:   Medial epicondylitis of right elbow (M77.01)  Left lateral epicondylitis (M77.12)         Referring Provider: Nabeel Lim  Date of Evaluation:    12/30/2024     Precautions:  None Next MD visit:   none scheduled  Date of Surgery: n/a   Insurance Primary/Secondary: [employer] / N/A     # Auth Visits: 8            Subjective: Pt arrives to the clinic with reports of an incident when she hit her L elbow on the corner of her glass table resulting in pain and soreness. Pt states her soreness lasted a day or two and has since improved. Pt reports an overall improvement in her lateral elbow pain but is still having some discomfort on the L with repetitive wrist motions and lifting. No other concerns at this time.    Pain: \"2-3\"/10 generalized soreness near lateral epicondyle and directly on bone where she hit her elbow on the L.      Objective: Trps and soft tissue restrictions of her wrist extensor musculature on the L more than R.  Accessory motion: hypomobility of T-spine in motions of extension and rotation bilaterally     Flexibility: Tightness of pec musculature bilaterally, supinator tightness on L more than R     Special tests:   Cozens test: + on L  Rowan: + on L  Resisted pronation caused pain on L  Wrist flexion against resistance: negative on R      Assessment: Pt responded well to skilled therapy session w/o an increase in medial or lateral elbow pain following exercise prescription. Pt was progressed in  strengthening exercises as well as exercises that focused on postural control. Pt will continue to benefit from skilled therapy to return to PLOF symptom free.       Goals:   Pt will improve bilaterally  strength to >65 lbs to be able to open a jar without difficulty.  Pt will demonstrate improved elbow extensor mm tension to WNL to be able to carry objects at home and work >25 lbs with <1/10 elbow pain   Pt will have WNL wrist extensor mm flexibility to be able to drive without pain   Pt  will be independent and compliant with comprehensive HEP to maintain progress achieved in PT    Plan: Progress per POC, focus on postural musculature and  strength. Re-assess next visit  Date: 1/6/2025  TX#: 2/ Date:1/15/2025                 TX#: 3/ Date:1/21/2025                 TX#: 4/ Date:1/27/2025                 TX#: 5/ Date:1/31/2025   Tx#: 6/   Wrist extensor strumming on L Wrist extensor strumming on L Wrist extensor strumming on L UBE forward/backward 4 min lvl 1 UBE(UE+LE) forward/backward 5 min lvl 1   Flex bar variations using Yellow bar(twists and rainbows) Flex bar variations using Yellow bar(twists and rainbows) Flex bar variations using Yellow bar(twists and rainbows) Standing rows using black TB 3x10 Standing rows using black TB 3x12   Wrist Flexion and extensor  stretch  Wrist Flexion and extensor stretch  Wrist Flexion and extensor stretch  Shoulder ER pullout using RTB 2x12 per side Straight arm pull-down using BTB 3x10   Standing rows using GTB 3x10 Seated scapular retraction using RTB 3x10 Seated scapular retraction using GTB 3x10 Wrist extensor strumming on L Seated scapular retraction using GTB 3x10   ISO wrist flex and extension 3x10 w/ 3 sec holds Horizontal abduction in seated position using RTB 3x10 ISO wrist extension using 4 lbs DB 2x10 w/ 3 sec holds Wrist Flexion and extensor stretch  Wrist extensor +brachioradialis  strumming on L    ISO wrist flex and extension 3x10 w/ 3 sec holds Standing rows using black TB 3x10 Seated scapular retraction using GTB 3x10 Wrist Flexion and extensor stretch      Horizontal abduction in supine using RTB 3x10 Flex bar variations using Yellow bar(twists and rainbows) Flexion gapping of elbow using towel 2x8     Open book stretch 1x12 per side  Open book stretch 1x12 per side   HEP: Access Code: [email or web address]  URL: [email or web address]  Date: 12/30/2024  Prepared by: Stephan Scott     Exercises  - Seated Elbow Manual Massage  Perpendicular  - 3-4 x weekly  - Standing Wrist Flexion Stretch  - 1 x daily - 5 x weekly - 3 sets - 20-30 hold  - Standing Wrist Extension Stretch  - 1 x daily - 5 x weekly - 3 sets - 20-30 hold  - Seated Isometric Wrist Extension  - 5-6 x weekly - 2-3 sets - 10 reps  - Seated Isometric Wrist Flexion Neutral  - 5-6 x weekly - 2-3 sets - 10 reps    Charges: Ther Ex 35 min(2) MT(1)10min   HP(0)    Total Timed Treatment: 43 min  Total Treatment Time: 45 min

## 2025-02-03 ENCOUNTER — OFFICE VISIT (OUTPATIENT)
Dept: PHYSICAL THERAPY | Age: 50
End: 2025-02-03
Attending: FAMILY MEDICINE
Payer: COMMERCIAL

## 2025-02-03 PROCEDURE — 97110 THERAPEUTIC EXERCISES: CPT

## 2025-02-03 PROCEDURE — 97140 MANUAL THERAPY 1/> REGIONS: CPT

## 2025-02-03 NOTE — PROGRESS NOTES
Diagnosis:   Medial epicondylitis of right elbow (M77.01)  Left lateral epicondylitis (M77.12)         Referring Provider: Nabeel Lim  Date of Evaluation:    12/30/2024     Precautions:  None Next MD visit:   none scheduled  Date of Surgery: n/a   Insurance Primary/Secondary: [employer]IG[employer] / N/A     # Auth Visits: 8           Progress Summary  Pt has attended 7 visits in Physical Therapy. Pt reports overall improvement in symptoms since the start of therapy. Pt no longer has discomfort in her R elbow at all but still has some discomfort and soreness on the L with certain resisted wrist movements. Pt was able to display an improvement in L sided  strength going from 35 lbs to 48 lbs of pressure as well as improvement in her R  strength improving from 60 to 74 lbs w/o an increase in elbow pain. Pt still has trace levels of Trps and soft tissue restrictions of her wrist extensor bundle on the L compared to the R.     Subjective: Pt reports an overall improvement in her lateral elbow pain but is still having some discomfort on the L with repetitive wrist motions and lifting. Pt was able to go to [employer] w/o an increase in elbow discomfort bilaterally. Dry needling was discussed and she elected to complete the modality. Patient acknowledges risks and benefits of dry needling and made an informed decision in use of triggerpoint dry needling with certified physical therapist. Patient denied use of anticoagulants or history of blood clotting disorders.  No other concerns at this time.    Pain: \"2-3\"/10 generalized soreness near lateral epicondyle and directly on bone where she hit her elbow on the L.      Objective: Trps and soft tissue restrictions of her wrist extensor musculature on the L more than R.  Accessory motion: hypomobility of T-spine in motions of extension and rotation bilaterally     Flexibility: Tightness of pec musculature bilaterally, supinator tightness on L more than R     Special tests:   Milton  test: + on L  Rowan: + on L  Resisted pronation caused pain on L  Wrist flexion against resistance: negative on R      Assessment: Pt responded well to skilled therapy session w/o an increase in medial or lateral elbow pain following exercise prescription. Pt was progressed in  strengthening exercises as well as exercises that focused on postural control. Pt will continue to benefit from skilled therapy to return to PLOF symptom free.       Goals:   Pt will improve bilaterally  strength to >65 lbs to be able to open a jar without difficulty.(Improving)  Pt will demonstrate improved elbow extensor mm tension to WNL to be able to carry objects at home and work >25 lbs with <1/10 elbow pain.(Improving)  Pt will have WNL wrist extensor mm flexibility to be able to drive without pain. MET  Pt will be independent and compliant with comprehensive HEP to maintain progress achieved in PT. (Improving)    Plan: Progress per POC, focus on postural musculature and  strength. Have pt complete Q-dash next visit  Date:1/15/2025                 TX#: 3/ Date:1/21/2025                 TX#: 4/ Date:1/27/2025                 TX#: 5/ Date:1/31/2025   Tx#: 6/ Date:2/3/2025   Tx#: 7/   Wrist extensor strumming on L Wrist extensor strumming on L UBE forward/backward 4 min lvl 1 UBE(UE+LE) forward/backward 5 min lvl 1 Wrist extensor strumming on L   Flex bar variations using Yellow bar(twists and rainbows) Flex bar variations using Yellow bar(twists and rainbows) Standing rows using black TB 3x10 Standing rows using black TB 3x12 Dry needling of wrist extensor bundle and brachioradialis w/ twitch responds elicited    Wrist Flexion and extensor stretch  Wrist Flexion and extensor stretch  Shoulder ER pullout using RTB 2x12 per side Straight arm pull-down using BTB 3x10 Wrist Flexion and extensor stretch    Seated scapular retraction using RTB 3x10 Seated scapular retraction using GTB 3x10 Wrist extensor strumming on L Seated  scapular retraction using GTB 3x10 Seated scapular retraction using GTB 3x10   Horizontal abduction in seated position using RTB 3x10 ISO wrist extension using 4 lbs DB 2x10 w/ 3 sec holds Wrist Flexion and extensor stretch  Wrist extensor +brachioradialis  strumming on L Hot pack following dry needling and stretching   ISO wrist flex and extension 3x10 w/ 3 sec holds Standing rows using black TB 3x10 Seated scapular retraction using GTB 3x10 Wrist Flexion and extensor stretch      Horizontal abduction in supine using RTB 3x10 Flex bar variations using Yellow bar(twists and rainbows) Flexion gapping of elbow using towel 2x8     Open book stretch 1x12 per side  Open book stretch 1x12 per side    HEP: Access Code: [email or web address]  URL: [email or web address]  Date: 12/30/2024  Prepared by: Stephan Scott     Exercises  - Seated Elbow Manual Massage Perpendicular  - 3-4 x weekly  - Standing Wrist Flexion Stretch  - 1 x daily - 5 x weekly - 3 sets - 20-30 hold  - Standing Wrist Extension Stretch  - 1 x daily - 5 x weekly - 3 sets - 20-30 hold  - Seated Isometric Wrist Extension  - 5-6 x weekly - 2-3 sets - 10 reps  - Seated Isometric Wrist Flexion Neutral  - 5-6 x weekly - 2-3 sets - 10 reps        Plan: Continue skilled Physical Therapy 1 x/week or a total of 4 visits over a 90 day period. Treatment will include: Manual Rx, Therapeutic Exercise and Activities, Strengthening, Neuro re-ed, HEP, Modalities as needed for pain modulation.        Patient/Family/Caregiver was advised of these findings, precautions, and treatment options and has agreed to actively participate in planning and for this course of care.    Thank you for your referral. If you have any questions, please contact me at Dept: [phone].    Sincerely,  Electronically signed by therapist: Stephan Scott PT     Physician's certification required:  No  Please co-sign or sign and return this letter via fax as soon as possible to [phone].    I certify the need for these services furnished under this plan of treatment and while under my care.    X___________________________________________________ Date____________________    Certification From: 2/3/2025  To:5/4/2025      Charges: Ther Ex (1) MT(2)   HP(0)    Total Timed Treatment: 43 min  Total Treatment Time: 45 min

## 2025-02-18 ENCOUNTER — APPOINTMENT (OUTPATIENT)
Dept: PHYSICAL THERAPY | Age: 50
End: 2025-02-18
Attending: FAMILY MEDICINE
Payer: COMMERCIAL

## 2025-02-20 ENCOUNTER — TELEPHONE (OUTPATIENT)
Facility: CLINIC | Age: 50
End: 2025-02-20

## 2025-02-20 DIAGNOSIS — M77.01 MEDIAL EPICONDYLITIS OF RIGHT ELBOW: Primary | ICD-10-CM

## 2025-02-20 DIAGNOSIS — M77.12 LEFT LATERAL EPICONDYLITIS: ICD-10-CM

## 2025-02-20 NOTE — TELEPHONE ENCOUNTER
DME order pending.  Not sure what side the sling was for so Dx code needs to be added.      Daysi Swift sent to Nabeel Lim DO; Yovani Griffin RN  Good morning team, my name is Daysi I am from [facility]. When there is time may you or someone from your team please upload a DME order for pt attached for the sling they received on 12/6/24? we received the signed delivery ticket but we will need the RX to be able to bill their insurance. thank you!

## 2025-02-21 ENCOUNTER — APPOINTMENT (OUTPATIENT)
Dept: PHYSICAL THERAPY | Age: 50
End: 2025-02-21
Attending: FAMILY MEDICINE
Payer: COMMERCIAL

## 2025-02-24 ENCOUNTER — APPOINTMENT (OUTPATIENT)
Dept: PHYSICAL THERAPY | Age: 50
End: 2025-02-24
Attending: FAMILY MEDICINE
Payer: COMMERCIAL

## 2025-03-03 ENCOUNTER — APPOINTMENT (OUTPATIENT)
Dept: PHYSICAL THERAPY | Age: 50
End: 2025-03-03
Attending: FAMILY MEDICINE
Payer: COMMERCIAL

## 2025-04-22 ENCOUNTER — TELEPHONE (OUTPATIENT)
Facility: CLINIC | Age: 50
End: 2025-04-22

## 2025-04-22 NOTE — TELEPHONE ENCOUNTER
----- Message from Adelina CARMEN sent at 7/29/2024  9:54 AM CDT -----  Regarding: Repeat pap  Repeat pap smear in 1 year due 7/5/25 per .

## 2025-05-11 PROBLEM — Z01.419 WELL WOMAN EXAM WITH ROUTINE GYNECOLOGICAL EXAM: Status: RESOLVED | Noted: 2020-06-01 | Resolved: 2025-05-11

## 2025-05-11 PROBLEM — L98.7 EXCESS SKIN OF ABDOMINAL WALL: Status: RESOLVED | Noted: 2020-01-24 | Resolved: 2025-05-11

## 2025-05-11 NOTE — ASSESSMENT & PLAN NOTE
Clinical Course: stable   Good control    Antidepressant Meds: buPROPion ER Tb24 - 150 MG; DULoxetine Cpep - 60 MG  Anxiolytic Meds: ALPRAZolam ER Tb24 - 2 MG

## 2025-05-12 ENCOUNTER — OFFICE VISIT (OUTPATIENT)
Dept: FAMILY MEDICINE CLINIC | Facility: CLINIC | Age: 50
End: 2025-05-12
Payer: COMMERCIAL

## 2025-05-12 VITALS
SYSTOLIC BLOOD PRESSURE: 126 MMHG | BODY MASS INDEX: 25.02 KG/M2 | HEIGHT: 65 IN | WEIGHT: 150.19 LBS | HEART RATE: 74 BPM | RESPIRATION RATE: 14 BRPM | DIASTOLIC BLOOD PRESSURE: 72 MMHG | OXYGEN SATURATION: 98 %

## 2025-05-12 DIAGNOSIS — I73.00 RAYNAUD'S PHENOMENON WITHOUT GANGRENE: ICD-10-CM

## 2025-05-12 DIAGNOSIS — F41.1 GAD (GENERALIZED ANXIETY DISORDER): ICD-10-CM

## 2025-05-12 DIAGNOSIS — N95.1 MENOPAUSAL SYMPTOMS: ICD-10-CM

## 2025-05-12 DIAGNOSIS — F90.2 ATTENTION DEFICIT HYPERACTIVITY DISORDER (ADHD), COMBINED TYPE: ICD-10-CM

## 2025-05-12 DIAGNOSIS — R73.03 PREDIABETES: Primary | ICD-10-CM

## 2025-05-12 PROCEDURE — 99214 OFFICE O/P EST MOD 30 MIN: CPT | Performed by: FAMILY MEDICINE

## 2025-05-12 RX ORDER — DULOXETIN HYDROCHLORIDE 60 MG/1
60 CAPSULE, DELAYED RELEASE ORAL 2 TIMES DAILY
COMMUNITY

## 2025-05-12 RX ORDER — LIDOCAINE HYDROCHLORIDE 20 MG/ML
15 SOLUTION OROPHARYNGEAL
COMMUNITY
Start: 2025-03-27

## 2025-05-12 NOTE — PROGRESS NOTES
Subjective:   Rita is a 49 year old female coming in for had concerns including Menopause (Discuss during visit ), Hip Pain (Right hip has been in pain recently ), and Hand Pain (Raynauds in both hands would like to discuss ).   HPI  History of Present Illness  Rita Webb is a 49 year old female who presents with menopausal symptoms and exacerbation of Raynaud's phenomenon.    She experiences symptoms potentially related to menopause, including episodes of feeling extremely hot and then cold, which she describes as 'throwing the dog off me, throwing blankets off me.' She is unsure if these are hot flashes. Additionally, she mentions emotional fluctuations, such as feeling like crying during a play, and physical discomforts including hip and back pain. She is still having regular menstrual cycles.    She has a history of Raynaud's phenomenon since 2014, which had been asymptomatic for years but has recently worsened. Her hands and now her toes become extremely cold and discolored, resembling 'cadavers at the end of my fingers.' Her hands and feet are always cold, even when not experiencing a Raynaud's episode. She has not discussed this with a healthcare provider since the initial diagnosis.    She mentions a history of low blood pressure, with a previous reading that was concerningly low, though she did not feel dizzy at the time. She experiences dizziness when standing up quickly, which she manages by increasing salt intake and standing up slowly.    Her current medications include Cymbalta (duloxetine), which was recently switched from Pristiq (desvenlafaxine) due to dry mouth issues. She also takes pregabalin and uses lozenges to manage burning mouth symptoms, which remain despite the medication change.      /72   Pulse 74   Resp 14   Ht 5' 5\" (1.651 m)   Wt 150 lb 3.2 oz (68.1 kg)   LMP 07/12/2024 (Exact Date)   SpO2 98%   BMI 24.99 kg/m²  Body mass index is 24.99 kg/m².   Physical  Exam  Constitutional:       Appearance: She is well-developed.   HENT:      Head: Normocephalic and atraumatic.   Pulmonary:      Effort: Pulmonary effort is normal. No respiratory distress.   Musculoskeletal:         General: Normal range of motion.      Cervical back: Normal range of motion.   Skin:     Findings: No rash.   Neurological:      Mental Status: She is alert and oriented to person, place, and time.   Psychiatric:         Mood and Affect: Mood normal.         Behavior: Behavior normal.         Thought Content: Thought content normal.         Judgment: Judgment normal.        Physical Exam  VITALS: BP- 126/72  CARDIOVASCULAR: Blood pressure 126/72 mmHg        Results       Assessment & Plan  Prediabetes  1/10/2025: HgbA1C 4.9; Glucose 79 Sugar control is stable  Continue with current treatment plan  Pre-Diabetic. Not currently on Diabetic meds.          Attention deficit hyperactivity disorder (ADHD), combined type  ADHD shows Good control.  Weight trend: has been stable  Stimulant Meds: amphetamine-dextroamphetamine ER Cp24 - 30 MG; amphetamine-dextroamphetamine Tabs - 30 MG  Non-Stimulant Meds: buPROPion ER Tb24 - 150 MG stable  Continue with current treatment plan          ANDRESSA (generalized anxiety disorder)  Clinical Course: stable   Good control    Antidepressant Meds: buPROPion ER Tb24 - 150 MG; DULoxetine Cpep - 60 MG  Anxiolytic Meds: ALPRAZolam ER Tb24 - 2 MG          Raynaud's phenomenon without gangrene  Discussed options incluiding CCB like procardia       Menopausal symptoms  Will get labs and discuss options when back, recently off pristiw for duloxetine.   Orders:    FSH; Future; Expected date: 05/12/2025    LH (Luteinizing Hormone); Future; Expected date: 05/12/2025    Estradiol; Future; Expected date: 05/12/2025    Progesterone; Future; Expected date: 05/12/2025    Testosterone,Total and Weakly Bound w/ SHBG; Future; Expected date: 05/12/2025             Assessment & Plan  Raynaud's  phenomenon  Increased frequency and severity affecting hands and toes. Discussed nifedipine for symptom management and potential side effects. Considered seasonal medication use and alternative treatments like sildenafil.  - Consider nifedipine at lowest dose if symptoms persist.  - Monitor blood pressure closely if medication is initiated.  - Discuss seasonal medication use for colder months.  - Consider sildenafil if nifedipine is not tolerated.    Low blood pressure  Previous readings causing dizziness. Current blood pressure 106/60. Discussed impact of calcium channel blockers and importance of monitoring.  - Monitor blood pressure regularly, especially with new medications.  - Advise standing up slowly to prevent dizziness.    Menopausal symptoms  Symptoms include hot flashes and emotional lability. Discussed low-dose birth control and hormone replacement therapy risks. Plan to evaluate hormone levels.  - Order lab work for hormone levels: estrogen, testosterone, progesterone.  - Consider low-dose birth control if hormonal imbalance is indicated.  - Re-evaluate symptoms and treatment after lab results.    Hip pain  New onset possibly related to menopausal changes. Discussed impact of lower estrogen levels.  - Evaluate hormone levels for potential contribution to symptoms.  - Consider treatment options after hormone evaluation.    Back pain  Chronic pain possibly exacerbated by menopausal changes. Discussed impact of lower estrogen levels.  - Evaluate hormone levels for potential contribution to symptoms.  - Consider treatment options after hormone evaluation.  I have discontinued Rita Webb's desvenlafaxine ER, traMADol, and diclofenac. I am also having her maintain her ALPRAZolam ER, buPROPion ER, amphetamine-dextroamphetamine ER, amphetamine-dextroamphetamine, PreviDent 5000 Dry Mouth, valACYclovir, clotrimazole, pregabalin, DULoxetine, and Lidocaine Viscous HCl.       Return in about 2 weeks (around  5/26/2025).

## 2025-05-19 ENCOUNTER — LAB ENCOUNTER (OUTPATIENT)
Dept: LAB | Age: 50
End: 2025-05-19
Attending: FAMILY MEDICINE
Payer: COMMERCIAL

## 2025-05-19 DIAGNOSIS — N95.1 MENOPAUSAL SYMPTOMS: ICD-10-CM

## 2025-05-19 LAB
ESTRADIOL SERPL-MCNC: 132.2 PG/ML
FSH SERPL-ACNC: 5.1 MIU/ML
LH SERPL-ACNC: 2.7 MIU/ML
PROGEST SERPL-MCNC: <0.21 NG/ML

## 2025-05-19 PROCEDURE — 84144 ASSAY OF PROGESTERONE: CPT

## 2025-05-19 PROCEDURE — 36415 COLL VENOUS BLD VENIPUNCTURE: CPT

## 2025-05-19 PROCEDURE — 82670 ASSAY OF TOTAL ESTRADIOL: CPT

## 2025-05-19 PROCEDURE — 84410 TESTOSTERONE BIOAVAILABLE: CPT

## 2025-05-19 PROCEDURE — 83002 ASSAY OF GONADOTROPIN (LH): CPT

## 2025-05-19 PROCEDURE — 83001 ASSAY OF GONADOTROPIN (FSH): CPT

## 2025-05-23 LAB
SEX HORM BIND GLOB: 93.3 NMOL/L
TESTOST % FREE+WEAK BND: 10.5 %
TESTOST FREE+WEAK BND: 1.7 NG/DL
TESTOSTERONE TOT /MS: 16.1 NG/DL

## 2025-06-09 ENCOUNTER — OFFICE VISIT (OUTPATIENT)
Facility: CLINIC | Age: 50
End: 2025-06-09
Payer: COMMERCIAL

## 2025-06-09 VITALS
HEART RATE: 84 BPM | BODY MASS INDEX: 23.49 KG/M2 | HEIGHT: 65 IN | WEIGHT: 141 LBS | DIASTOLIC BLOOD PRESSURE: 64 MMHG | SYSTOLIC BLOOD PRESSURE: 100 MMHG

## 2025-06-09 DIAGNOSIS — Z12.4 PAPANICOLAOU SMEAR FOR CERVICAL CANCER SCREENING: ICD-10-CM

## 2025-06-09 DIAGNOSIS — Z13.71 BRCA GENE MUTATION NEGATIVE: ICD-10-CM

## 2025-06-09 DIAGNOSIS — N87.0 DYSPLASIA OF CERVIX, LOW GRADE (CIN 1): Primary | ICD-10-CM

## 2025-06-09 DIAGNOSIS — Z12.31 ENCOUNTER FOR SCREENING MAMMOGRAM FOR BREAST CANCER: ICD-10-CM

## 2025-06-09 PROCEDURE — 87624 HPV HI-RISK TYP POOLED RSLT: CPT | Performed by: OBSTETRICS & GYNECOLOGY

## 2025-06-09 PROCEDURE — 88175 CYTOPATH C/V AUTO FLUID REDO: CPT | Performed by: OBSTETRICS & GYNECOLOGY

## 2025-06-09 NOTE — PROGRESS NOTES
Rita Webb is a 49 year old female  Patient's last menstrual period was 2025 (exact date). No chief complaint on file.  .     She still has a period every month she bleeds for 4 to 6 days mild cramping on the first day no bleeding between.  No vaginal dryness no hot flashes or night sweats blood work is done with her primary.    She is BRCA negative she had a colonoscopy and has to go back after 5 years she takes a multivitamin with vitamin D    OBSTETRICS HISTORY:  OB History    Para Term  AB Living   1 0 0 0 0 1   SAB IAB Ectopic Multiple Live Births   0 0 0  1      # Outcome Date GA Lbr Ajay/2nd Weight Sex Type Anes PTL Lv   1  08 38w0d  8 lb 15 oz (4.054 kg) M CS-Unspec   SERGIO      Birth Comments: System Generated. Please review and update pregnancy details.       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   Tobacco Use    Smoking status: Never     Passive exposure: Never    Smokeless tobacco: Never   Vaping Use    Vaping status: Never Used   Substance and Sexual Activity    Alcohol use: No    Drug use: No    Sexual activity: Yes     Partners: Male   Other Topics Concern    Caffeine Concern Yes     Comment: 1 soda a week    Exercise Yes     Comment: 3x week cardio    Seat Belt Yes    Special Diet Not Asked    Stress Concern Not Asked    Weight Concern Not Asked     Service Not Asked    Blood Transfusions Not Asked    Occupational Exposure  Not Asked    Hobby Hazards Not Asked    Sleep Concern Not Asked    Back Care Not Asked    Bike Helmet Not Asked    Self-Exams No   Social History Narrative    Not on file     Social Drivers of Health     Food Insecurity: No Food Insecurity (1/16/2025)    NCSS - Food Insecurity     Worried About Running Out of Food in the Last Year: No     Ran Out of Food in the Last Year: No   Transportation Needs: No Transportation Needs (1/16/2025)    NCSS - Transportation     Lack of Transportation: No   Stress: Not on file   Housing Stability: Not At Risk (1/16/2025)    NCSS - Housing/Utilities     Has Housing: Yes     Worried About Losing Housing: No     Unable to Get Utilities: No       FAMILY HISTORY:  Family History[3]    MEDICATIONS:  Medications - Current[4]    ALLERGIES:  Allergies[5]      Review of Systems:  Constitutional:  Denies fatigue, night sweats, hot flashes  Gastrointestinal:  denies heartburn, abdominal pain, diarrhea or constipation  Genitourinary:  denies dysuria, incontinence, abnormal vaginal discharge, vaginal itching  Skin/Breast:  Denies any breast pain, lumps, or discharge.   Neurological:  denies headaches, extremity weakness or numbness.      PHYSICAL EXAM:   Constitutional: well developed, well nourished  Head/Face: normocephalic  Neck/Thyroid: thyroid symmetric, no thyromegaly, no nodules, no adenopathy  Breast: normal without palpable masses, tenderness, asymmetry, nipple discharge, nipple retraction or skin changes  Abdomen:  soft, nontender, nondistended, no masses  Skin/Hair: no unusual rashes or bruises   Extremities: no edema, no cyanosis  Psychiatric:  Oriented to time, place, person and situation. Appropriate mood and affect    Pelvic Exam:  External Genitalia: normal appearance, hair distribution, and no lesions  Urethral Meatus:  normal in size, location, without lesions and prolapse  Bladder:  No fullness, masses or tenderness  Vagina:  Normal appearance without lesions, no abnormal  discharge  Cervix:  Normal without tenderness on motion without lesions Pap.  Uterus: normal in size, contour, position, mobility, without tenderness  Adnexa: normal without masses or tenderness  Perineum: normal  Anus: no hemorroids     Assessment & Plan:  Diagnoses and all orders for this visit:    Dysplasia of cervix, low grade (ANNAMARIA 1)  -     ThinPrep PAP with HPV Reflex Request B; Future    Papanicolaou smear for cervical cancer screening  -     ThinPrep PAP with HPV Reflex Request B; Future    Encounter for screening mammogram for breast cancer  -     [facility] EVIE 2D+3D SCREENING BILAT (CPT=77067/82342); Future                       [1]   Past Medical History:   Anxiety    Anxiety state    Chronic sore throat    Cyst (solitary) of breast    fibrocystic breast with many cysts    GERD (gastroesophageal reflux disease)    maybe silent reflux    History of stomach ulcers    HPV in female    Infertility, female    had in vitro    LPRD (laryngopharyngeal reflux disease)    Phlegm in throat    constant    Prediabetes    Screening mammogram for breast cancer    There is no mammographic evidence of malignancy.   RECOMMENDATION: A 1 year screening mammogram is recommended.  Screening Breast MRI is recommended at 6 month intervals with annual screening mammography.    Skin cancer    on back BCC    Stomach ulcer    Stress    Stress headaches    Visual impairment    contacts   [2]   Past Surgical History:  Procedure Laterality Date      2008    Colonoscopy  2024    Upper gi endoscopy - referral  2013    stomach ulcers   [3]   Family History  Problem Relation Age of Onset    Heart Disease Father         6 bypass    Breast Cancer Paternal Grandmother         dx age 40s    Cancer Paternal Grandmother         Passed away at 43    Prostate Cancer Maternal Grandfather         dx age 70s    Cancer Maternal Grandfather     Other (osteoporosis) Maternal Grandmother     No Known Problems Mother     No Known  Problems Sister     No Known Problems Brother     No Known Problems Son     Breast Cancer Paternal Cousin Female 46    Diabetes Neg     Heart Disorder Neg     Hypertension Neg     Lipids Neg     Obesity Neg     Psychiatric Neg    [4]   Current Outpatient Medications:     ALPRAZolam ER 2 MG Oral Tablet 24 Hr, Take 1 tablet (2 mg total) by mouth daily., Disp: , Rfl: 1    DULoxetine 60 MG Oral Cap DR Particles, Take 1 capsule (60 mg total) by mouth 2 (two) times daily., Disp: , Rfl:     Lidocaine Viscous HCl 2 % Mouth/Throat Solution, Use as directed 15 mL in the mouth or throat., Disp: , Rfl:     clotrimazole 10 MG Mouth/Throat Michelle, TAKE 1 LOZENGE BY MOUTH FOUR TIMES DAILY, Disp: 140 Michelle, Rfl: 11    pregabalin 200 MG Oral Cap, Take 1 capsule (200 mg total) by mouth 3 (three) times daily., Disp: 270 capsule, Rfl: 1    valACYclovir 1 G Oral Tab, Take 2 tablets (2,000 mg total) by mouth every 12 (twelve) hours. 2 doses per episode, Disp: 16 tablet, Rfl: 1    PREVIDENT 5000 DRY MOUTH 1.1 % Dental Gel, , Disp: , Rfl:     amphetamine-dextroamphetamine ER 30 MG Oral Capsule SR 24 Hr, Take 1 capsule (30 mg total) by mouth every morning., Disp: , Rfl:     amphetamine-dextroamphetamine 30 MG Oral Tab, Take 1 tablet (30 mg total) by mouth 2 (two) times daily., Disp: , Rfl:     buPROPion HCl ER, XL, 150 MG Oral Tablet 24 Hr, Take 1 tablet (150 mg total) by mouth daily., Disp: , Rfl:   [5] No Known Allergies

## 2025-06-12 DIAGNOSIS — Z01.419 ENCOUNTER FOR GYNECOLOGICAL EXAMINATION WITHOUT ABNORMAL FINDING: Primary | ICD-10-CM

## 2025-06-13 ENCOUNTER — OFFICE VISIT (OUTPATIENT)
Dept: FAMILY MEDICINE CLINIC | Facility: CLINIC | Age: 50
End: 2025-06-13
Payer: COMMERCIAL

## 2025-06-13 VITALS
HEART RATE: 77 BPM | SYSTOLIC BLOOD PRESSURE: 100 MMHG | BODY MASS INDEX: 23 KG/M2 | TEMPERATURE: 99 F | OXYGEN SATURATION: 98 % | RESPIRATION RATE: 16 BRPM | DIASTOLIC BLOOD PRESSURE: 60 MMHG | WEIGHT: 140.81 LBS

## 2025-06-13 DIAGNOSIS — N95.1 PERIMENOPAUSAL: Primary | ICD-10-CM

## 2025-06-13 PROCEDURE — 99213 OFFICE O/P EST LOW 20 MIN: CPT | Performed by: PHYSICIAN ASSISTANT

## 2025-06-13 NOTE — PROGRESS NOTES
The following individual(s) verbally consented to be recorded using ambient AI listening technology and understand that they can each withdraw their consent to this listening technology at any point by asking the clinician to turn off or pause the recording:    Patient name: Rita Serenity Webb

## 2025-06-16 LAB — HPV E6+E7 MRNA CVX QL NAA+PROBE: NEGATIVE

## 2025-06-17 NOTE — PROGRESS NOTES
Subjective:   Rita Webb is a 49 year old female who presents for Follow - Up (Follow up on lab work.)     History/Other:   History of Present Illness  Rita Webb is a 49 year old female who presents with hip pain and concerns about perimenopausal symptoms.    She has been experiencing significant hip pain for approximately six to eight months, particularly when lying on her side. The pain is severe on some nights, necessitating a change in sleeping position to alleviate discomfort. It is localized to the hip area and is exacerbated by certain positions and activities, such as standing with her hip popped out to one side. There is no history of injury associated with the onset of the pain.    She has undergone hormone testing to assess for perimenopausal status, with results showing an estradiol level of 132 and a progesterone level of less than 0.21. She is not experiencing significant hot flashes, though she occasionally feels warmer at night, attributing this to environmental factors rather than true hot flashes. Her menstrual cycles remain regular without spotting or changes in frequency. Emotional symptoms, such as mood changes, occur primarily two days before her period. Her testing was done on day 18 of cycle.    Her social history includes adopting ten-year-old twins from [facility] a year and three months ago, which has added stress to her life. 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 She denies using any hormonal treatments or having an IUD. There is no significant family history of similar symptoms.     Chief Complaint Reviewed and Verified  Nursing Notes Reviewed and   Verified  Tobacco Reviewed  Allergies Reviewed  Medications Reviewed    Social History Reviewed         Tobacco:  She has never smoked tobacco.    Current Medications[1]      Objective:   /60 (BP Location: Right arm, Patient Position: Sitting, Cuff Size: adult)   Pulse 77   Temp 99.3 °F (37.4 °C) (Oral)   Resp 16    Wt 140 lb 12.8 oz (63.9 kg)   LMP 05/30/2025 (Exact Date)   SpO2 98%   BMI 23.43 kg/m²  Estimated body mass index is 23.43 kg/m² as calculated from the following:    Height as of 6/9/25: 5' 5\" (1.651 m).    Weight as of this encounter: 140 lb 12.8 oz (63.9 kg).  Results  LABS  Estradiol: 132 pg/mL (05/19/2025)  Progesterone: 0.21 ng/mL (05/19/2025)     Physical Exam  GENERAL: Alert, cooperative, well developed, no acute distress.  EXTREMITIES: No cyanosis or edema.  MUSCULOSKELETAL: Tenderness in the hip region, bony prominence in hip region. Tender R greater trochanter. R hip strength >L hip.      Assessment & Plan:   1. Perimenopausal (Primary)  -     Estradiol; Future; Expected date: 06/13/2025  -     Progesterone; Future; Expected date: 06/13/2025    Assessment & Plan  Hip pain  Chronic hip pain for 6-8 months, primarily when lying on the affected side. Pain is intermittent and severe at times, possibly related to habitual postures and footwear. No injury history. Examination suggests possible bursitis due to bony hips and habitual postures. Differential includes bursitis and mechanical pain due to posture and footwear. She favors the right side, which may contribute to the pain.  - Advise wearing supportive footwear, such as [employer] brand, to reduce hip pain.  - Educate on maintaining proper posture and avoiding habitual hip popping to alleviate pressure on the bursa sac.  - Provide exercises to strengthen hips and quadriceps and improve posture.  - Consider physical therapy if pain persists or if additional support is needed.          WALT Oakes             [1]   Current Outpatient Medications   Medication Sig Dispense Refill    DULoxetine 60 MG Oral Cap DR Particles Take 1 capsule (60 mg total) by mouth 2 (two) times daily.      Lidocaine Viscous HCl 2 % Mouth/Throat Solution Use as directed 15 mL in the mouth or throat.      clotrimazole 10 MG Mouth/Throat Michelle TAKE 1 LOZENGE BY MOUTH FOUR TIMES  DAILY 140 Michelle 11    pregabalin 200 MG Oral Cap Take 1 capsule (200 mg total) by mouth 3 (three) times daily. 270 capsule 1    valACYclovir 1 G Oral Tab Take 2 tablets (2,000 mg total) by mouth every 12 (twelve) hours. 2 doses per episode 16 tablet 1    PREVIDENT 5000 DRY MOUTH 1.1 % Dental Gel       amphetamine-dextroamphetamine ER 30 MG Oral Capsule SR 24 Hr Take 1 capsule (30 mg total) by mouth every morning.      amphetamine-dextroamphetamine 30 MG Oral Tab Take 1 tablet (30 mg total) by mouth 2 (two) times daily.      ALPRAZolam ER 2 MG Oral Tablet 24 Hr Take 1 tablet (2 mg total) by mouth daily.  1    buPROPion HCl ER, XL, 150 MG Oral Tablet 24 Hr Take 1 tablet (150 mg total) by mouth daily.

## 2025-07-26 ENCOUNTER — LAB ENCOUNTER (OUTPATIENT)
Dept: LAB | Age: 50
End: 2025-07-26
Attending: PHYSICIAN ASSISTANT
Payer: COMMERCIAL

## 2025-07-26 DIAGNOSIS — N95.1 PERIMENOPAUSAL: ICD-10-CM

## 2025-07-26 LAB
ESTRADIOL SERPL-MCNC: 13.1 PG/ML
PROGEST SERPL-MCNC: 0.45 NG/ML

## 2025-07-26 PROCEDURE — 84144 ASSAY OF PROGESTERONE: CPT

## 2025-07-26 PROCEDURE — 36415 COLL VENOUS BLD VENIPUNCTURE: CPT

## 2025-07-26 PROCEDURE — 82670 ASSAY OF TOTAL ESTRADIOL: CPT

## 2025-08-01 ENCOUNTER — OFFICE VISIT (OUTPATIENT)
Dept: FAMILY MEDICINE CLINIC | Facility: CLINIC | Age: 50
End: 2025-08-01

## 2025-08-01 VITALS
RESPIRATION RATE: 18 BRPM | SYSTOLIC BLOOD PRESSURE: 118 MMHG | OXYGEN SATURATION: 96 % | HEIGHT: 65 IN | HEART RATE: 71 BPM | WEIGHT: 136 LBS | BODY MASS INDEX: 22.66 KG/M2 | DIASTOLIC BLOOD PRESSURE: 76 MMHG

## 2025-08-01 DIAGNOSIS — M25.531 WRIST PAIN, RIGHT: Primary | ICD-10-CM

## 2025-08-01 DIAGNOSIS — M79.631 PAIN OF RIGHT FOREARM: ICD-10-CM

## 2025-08-01 DIAGNOSIS — B00.1 COLD SORE: ICD-10-CM

## 2025-08-01 PROBLEM — N76.0 VAGINITIS AND VULVOVAGINITIS: Status: RESOLVED | Noted: 2020-06-23 | Resolved: 2025-08-01

## 2025-08-01 PROCEDURE — 99213 OFFICE O/P EST LOW 20 MIN: CPT | Performed by: NURSE PRACTITIONER

## 2025-08-01 RX ORDER — VALACYCLOVIR HYDROCHLORIDE 1 G/1
2000 TABLET, FILM COATED ORAL EVERY 12 HOURS SCHEDULED
Qty: 16 TABLET | Refills: 1 | Status: SHIPPED | OUTPATIENT
Start: 2025-08-01

## 2025-08-17 DIAGNOSIS — M79.7 FIBROMYALGIA: ICD-10-CM

## 2025-08-20 RX ORDER — PREGABALIN 200 MG/1
200 CAPSULE ORAL 3 TIMES DAILY
Qty: 270 CAPSULE | Refills: 1 | Status: SHIPPED | OUTPATIENT
Start: 2025-08-20

## (undated) DEVICE — Device

## (undated) DEVICE — SUTURE VICRYL 3-0 SH

## (undated) DEVICE — LAPAROTOMY CDS: Brand: MEDLINE INDUSTRIES, INC.

## (undated) DEVICE — DRESSING FOAM TOPIFOAM

## (undated) DEVICE — SUTURE VICRYL 2-0 SH

## (undated) DEVICE — VIOLET BRAIDED (POLYGLACTIN 910), SYNTHETIC ABSORBABLE SUTURE: Brand: COATED VICRYL

## (undated) DEVICE — SUTURE ETHIBOND EXCEL 0 CT-2

## (undated) DEVICE — REM POLYHESIVE ADULT PATIENT RETURN ELECTRODE: Brand: VALLEYLAB

## (undated) DEVICE — MARKER SKIN 2 TIP

## (undated) DEVICE — CG INFILTRATION TUBING: Brand: CG INFILTRATION TUBING

## (undated) DEVICE — HEMOCLIP HORIZON MED 002200

## (undated) DEVICE — TUBING LIPOSUCTION FLEX B

## (undated) DEVICE — PLASTIC BREAST CDS-LF: Brand: MEDLINE INDUSTRIES, INC.

## (undated) DEVICE — APPLICATOR COTTON TIP 3 10/PK

## (undated) DEVICE — 1010 S-DRAPE TOWEL DRAPE 10/BX: Brand: STERI-DRAPE™

## (undated) DEVICE — ASPIRATION TUBING SET, DISPOSABLE: Brand: MICROAIRE®

## (undated) DEVICE — GOWN,SIRUS,FABRIC-REINFORCED,X-LARGE: Brand: MEDLINE

## (undated) DEVICE — LIGHT HANDLE

## (undated) DEVICE — DRAPE HALF 40X58 DYNJP2410

## (undated) DEVICE — #11 STERILE BLADE: Brand: POLYMER COATED BLADES

## (undated) DEVICE — 3M™ STERI-STRIP™ REINFORCED ADHESIVE SKIN CLOSURES, R1548, 1 IN X 5 IN (25 MM X 125 MM), 4 STRIPS/ENVELOPE: Brand: 3M™ STERI-STRIP™

## (undated) DEVICE — SUTURE SILK 3-0 SH

## (undated) DEVICE — MARKER SKIN PREP RESIST STRL

## (undated) DEVICE — SOL  .9 1000ML BTL

## (undated) DEVICE — SUTURE MONOCRYL 4-0 PS-2

## (undated) DEVICE — SUTURE ETHIBOND 0 CT-1

## (undated) DEVICE — TOWEL OR BLU 16X26 STRL

## (undated) DEVICE — LAPAROTOMY SPONGE - RF AND X-RAY DETECTABLE PRE-WASHED: Brand: SITUATE

## (undated) DEVICE — SCD SLEEVE KNEE HI BLEND

## (undated) DEVICE — LIGACLIP MCA MULTIPLE CLIP APPLIERS, 20 SMALL CLIPS: Brand: LIGACLIP

## (undated) DEVICE — MEDI-VAC YANKAUER SUCTION HANDLE W/BULBOUS TIP: Brand: CARDINAL HEALTH

## (undated) DEVICE — CANISTER SHELL LIPOSUCTION

## (undated) DEVICE — GAMMEX® PI HYBRID SIZE 7.5, STERILE POWDER-FREE SURGICAL GLOVE, POLYISOPRENE AND NEOPRENE BLEND: Brand: GAMMEX

## (undated) DEVICE — DRAIN RESERVOIR RELIAVAC 100CC

## (undated) DEVICE — CHLORAPREP 26ML APPLICATOR

## (undated) DEVICE — 3M™ IOBAN™ 2 ANTIMICROBIAL INCISE DRAPE 6648EZ: Brand: IOBAN™ 2

## (undated) DEVICE — SUTURE VICRYL 0

## (undated) DEVICE — SUPER SPONGES,MEDIUM: Brand: KERLIX

## (undated) DEVICE — SUTURE ETHIBOND 2-0 CT-1

## (undated) DEVICE — BANDAGE ROLL,100% COTTON, 6 PLY, LARGE: Brand: KERLIX

## (undated) DEVICE — PROXIMATE RH ROTATING HEAD SKIN STAPLERS (35 WIDE) CONTAINS 35 STAINLESS STEEL STAPLES: Brand: PROXIMATE

## (undated) DEVICE — DRESSING BIOPATCH 1X4 CNTR

## (undated) DEVICE — BLADE ELECTRODE: Brand: EDGE

## (undated) DEVICE — CAUTERY BLADE 2IN INS E1455

## (undated) DEVICE — STERILE POLYISOPRENE POWDER-FREE SURGICAL GLOVES: Brand: PROTEXIS

## (undated) DEVICE — SUTURE PROLENE 4-0 PS-2

## (undated) DEVICE — DERMABOND LIQUID ADHESIVE

## (undated) DEVICE — GOWN,PREVENTION PLUS,XLARGE,STERILE: Brand: MEDLINE

## (undated) DEVICE — DRAIN RELIAVAC 100CC

## (undated) DEVICE — DRAIN ROUND HUBLESS 15FR

## (undated) DEVICE — KENDALL SCD EXPRESS SLEEVES, KNEE LENGTH, MEDIUM: Brand: KENDALL SCD

## (undated) NOTE — LETTER
[address] [facility], [address] [facility], [address] [id number] [phone]          Date: 2019     Patient Name: Jomar Garner   :   1975   Date of Surgery:  1/15/2020      Dear Dr Jaxon Ellis

## (undated) NOTE — LETTER
Rita Webb, :1975    CONSENT FOR PROCEDURE/SEDATION    1. I authorize the performance upon Rita Webb  the following:  Colposcopy    2. I authorize Dr. Iliana Dotson MD (and whomever is designated as the doctor’s assistant), to perform the above-mentioned procedures.    3. If any unforeseen conditions arise during this procedure calling for additional  procedures, operations, or medications (including anesthesia and blood transfusion), I further request and authorize the doctor to do whatever he/she deems advisable in my interest.    4. I consent to the taking and reproduction of any photographs in the course of this procedure for professional purposes.    5. I consent to the administration of such sedation as may be considered necessary or advisable by the physician responsible for this service, with the exception of ______________________________________________________    6. I have been informed by my doctor of the nature and purpose of this procedure sedation, possible alternative methods of treatment, risk involved and possible complications.    7. If I have a Do Not Resuscitate (DNR) order in place, the physician and I (or the individual authorized to consent on my behalf) will discuss and agree as to whether the Do Not Resuscitate (DNR) order will remain in effect or will be discontinued during the performance of the procedure and the applicable recovery period. If the Do Not Resuscitate (DNR) order is discontinued and is to be reinstated following the procedure/recovery period, the physician will determine when the applicable recovery period ends for purposes of reinstating the Do Not Resuscitate (DNR) order.    Signature of Patient:_______________________________________________    Signature of person authorized to consent for patient:  _______________________________________________________________    Relationship to patient: ____________________________________________    Witness:  _________________________________________ Date:___________     Physician Signature: _______________________________ Date:___________

## (undated) NOTE — LETTER
[facility] Department of OB/GYN  After Care Instructions for Colposcopy/Biopsy      Biopsy Results   You will receive a phone call with your biopsy results in 7 business days.  If you have not received your biopsy results in 7 days, please contact our office.  Your biopsy results will help the physician decide if and what further treatment is  necessary.    Bleeding   After your biopsy, the area is swabbed with a brown solution to help stop any bleeding.  For this reason, you may notice a brown or black clotty discharge lasting several days.  If you experience bright red bleeding that is heavy, you should call the office.     Restrictions    You should avoid douching, intercourse and tampon use for 3 days following your procedure.    Pain    If you are uncomfortable after the procedure, you may use Aleve, Tylenol or Ibuprofen for the discomfort.        If you have additional questions or concerns, please call us at [phone].

## (undated) NOTE — LETTER
OUTSIDE TESTING RESULT REQUEST     IMPORTANT: FOR YOUR IMMEDIATE ATTENTION  Please FAX all test results listed below to: [phone]     Testing already done on or about:   * * * * If testing is NOT complete, arrange with patient A.S.A.P. * * * *      Patient Name: Randolph Cobb  Surgery Date: 2022  CSN: [id number]  Medical Record: [id number]   : 1975 - A: 51 y      Sex: female  Surgeon(s):  Bony Verde MD  Procedure: Revision of abdominal scar with abdominal/flank liposuction  Anesthesia Type: General     Surgeon: Bony Verde MD     The following Testing and Time Line are REQUIRED PER ANESTHESIA     CBC [with Differential & Platelets] within  90 days  CMP (requires 4 hour fast) within  90 days      Thank You,   Sent by: Sully Nichols  5/13/15

## (undated) NOTE — MR AVS SNAPSHOT
After Visit Summary   6/1/2020    Chanda Ortiz    MRN: [id number]           Visit Information     Date & Time  6/1/2020 10:30 AM Provider  Lauri Pino MD [address] [place] [facility]  Dept.  Phone  [address] [facility] 6/1/2021    VAGINITIS/VAGINOSIS, DNA PROBE [[id number] CUSTOM]  6/1/2020 (Approximate) 6/1/2021      Follow-up Instructions    Return in about 1 year (around 6/1/2021) for annual.     Imaging Scheduling Instructions     Around June 1, 2020   Imaging:   [facility]  Monday – Friday  8:00 am – 8:00 pm   Saturday – Sunday  8:00 am – 4:00 pm    WALK-IN CARE  Primary Care Providers  Treatment for acute illness   or injury that are   non-life-threatening.   Also available by appointment

## (undated) NOTE — LETTER
[facility]  Phone: [phone]  Right Fax: [phone]    ADDRESSEE INFORMATION: SENDER INFORMATION:   To:   Dr. Rene Galeana From: Patricia Turner RN     Department: Pre-Admission Testing   Fax Number: [phone] Date: 2022     Phone Number:  Phone Number: [phone]   Re: Patient Name: Jovanny Dolan  CSN: [id number]  Medical Record: [id number]   : 1975 - A: 51 y    Sex: female Fax Number: [phone]     Number of Pages (Including Cover Sheet)        Clearance for Surgery Requested by Surgeon      Request for:  Medical Clearance    Requested by Surgeon: Dr. Kym Olson    Surgical Date: 2022    Procedure: Revision of abdominal scar with abdominal/flank liposuction, N/A        Please fax the clearance note to the [place] [phone]. Thank you. This message is intended only for the use of the individual or entity to which it is addressed. It may have been disclosed to you from records whose confidentiality is protected by [employer] and applicable state law. Federal Regulation, [address] [place] [place] part 164, prohibits you from making any further disclosure without specific authorization of the person to whom it pertains, or as otherwise permitted by such regulations. If the reader of this message is not the intended recipient, you are hereby notified that reading, disseminating, distributing or copying this communication is strictly prohibited. If you have received this communication in error, please immediately notify us by telephone and return the original message to us at [facility]. [address], [address] via the [facility].

## (undated) NOTE — MR AVS SNAPSHOT
After Visit Summary   6/15/2021    Cuate Lutz    MRN: [id number]           Visit Information     Date & Time  6/15/2021  4:15 PM Provider  Nita Mack MD [place] [address] [address] [facility]  Dept.  Phone  [phone] Group      Imaging Scheduling Instructions     Around Tea 15, 2021   Imaging:   [facility] EVIE 2D+3D SCREENING BILAT (WTS=[id number])    Instructions:  Your order will generate a \"Scheduling Ticket\" that will be available in [employer] to schedule on your own at [facility]  Monday – Friday  8:00 am – 8:00 pm   Saturday – Sunday  8:00 am – 4:00 pm    WALK-IN CARE  Primary Care Providers  Treatment for acute illness   or injury that are   non-life-threatening.   Also available by appointment

## (undated) NOTE — MR AVS SNAPSHOT
After Visit Summary   3/19/2024    Rita Webb   MRN: [id number]           Visit Information     Date & Time  3/19/2024 11:15 AM Provider  Edith Ng DO [facility] Neurodiagnostics Dept. Phone  [phone]      Your Vitals Were     LMP   02/26/2024 (Approximate)             Allergies as of 3/19/2024  Review status set to In Progress on 3/18/2024   No Known Allergies     Your Current Medications        Dosage    pregabalin (LYRICA) 150 MG Oral Cap Take 1 capsule (150 mg total) by mouth in the morning, at noon, and at bedtime.    clotrimazole 10 MG Mouth/Throat Michelle Take 1 lozenge (10 mg total) by mouth 4 (four) times daily.    pregabalin (LYRICA) 50 MG Oral Cap Week 1: together with 50mg capsule take 150mg in am, 100mg midday, and 100mg nightly, Week 2: if burning not better, increase to 150mg in am, 150mg midday, and 100mg nightly, Week 3: either continue this dose, or if burning not improved, increase to 150mg three times daily    pregabalin (LYRICA) 100 MG Oral Cap Week 1: together with 50mg capsule take 150mg in am, 100mg midday, and 100mg nightly, Week 2: if burning not better, increase to 150mg in am, 150mg midday, and 100mg nightly, Week 3: either continue this dose, or if burning not improved, increase to 150mg three times daily    Meloxicam 15 MG Oral Tab Take 1 tablet (15 mg total) by mouth daily.    PREVIDENT 5000 DRY MOUTH 1.1 % Dental Gel BRUSH ON 3 TIMES A WEEK    pregabalin (LYRICA) 100 MG Oral Cap Take 1 capsule (100 mg total) by mouth 3 (three) times daily.    valACYclovir 1 G Oral Tab Take 2 tablets (2,000 mg total) by mouth every 12 (twelve) hours. 2 doses per episode    amphetamine-dextroamphetamine ER 30 MG Oral Capsule SR 24 Hr Take 1 capsule (30 mg total) by mouth every morning.    amphetamine-dextroamphetamine 30 MG Oral Tab Take 1 tablet (30 mg total) by mouth 2 (two) times daily.    desvenlafaxine  MG Oral Tablet 24 Hr     ALPRAZolam ER 2 MG Oral  Tablet 24 Hr Take 1 tablet (2 mg total) by mouth daily.    buPROPion HCl ER, XL, 150 MG Oral Tablet 24 Hr Take 1 tablet (150 mg total) by mouth daily.      Diagnoses for This Visit    Burning mouth syndrome   [490071]  -  Primary  Primary osteoarthritis involving multiple joints   [2335432]    Fibromyalgia   [952549]             We Ordered the Following     Normal Orders This Visit    EMG/NCV [NEU5 CUSTOM]       Future Appointments        Provider Department    3/28/2024 7:45 AM Tomy Mohan [facility]    5/10/2024 10:30 AM Karma Gallego [facility]    6/26/2024 10:20 AM Rayne Winn [address]    8/5/2024 11:35 AM Edith Ng [facility]                Did you know that [facility] primary care physicians now offer Video Visits through [employer] for adult patients for a variety of conditions such as allergies, back pain and cold symptoms? Skip the drive and waiting room and online chat with a doctor face-to-face using your web-cam enabled computer or mobile device wherever you are. Video Visits cost $50 and can be paid hassle-free using a credit, debit, or health savings card.  Not active on [employer]? Ask us how to get signed up today!          If you receive a survey from [employer], please take a few minutes to complete it and provide feedback. We strive to deliver the best patient experience and are looking for ways to make improvements. Your feedback will help us do so. For more information on [employer], please visit [email or web address]           No text in SmartText           No text in SmartText

## (undated) NOTE — LETTER
[address] [place] [address]  [phone]          Date: 2022     Patient Name: Parag Wong   :   1975   Date of Surgery:  22      Dear Dr Lia Couch